# Patient Record
Sex: MALE | Race: WHITE | NOT HISPANIC OR LATINO
[De-identification: names, ages, dates, MRNs, and addresses within clinical notes are randomized per-mention and may not be internally consistent; named-entity substitution may affect disease eponyms.]

---

## 2017-02-22 ENCOUNTER — PRIOR ORIGINAL RECORDS (OUTPATIENT)
Dept: OTHER | Age: 67
End: 2017-02-22

## 2017-03-03 ENCOUNTER — TELEPHONE (OUTPATIENT)
Dept: FAMILY MEDICINE CLINIC | Facility: CLINIC | Age: 67
End: 2017-03-03

## 2017-03-08 ENCOUNTER — TELEPHONE (OUTPATIENT)
Dept: FAMILY MEDICINE CLINIC | Facility: CLINIC | Age: 67
End: 2017-03-08

## 2017-03-08 ENCOUNTER — MED REC SCAN ONLY (OUTPATIENT)
Dept: FAMILY MEDICINE CLINIC | Facility: CLINIC | Age: 67
End: 2017-03-08

## 2017-06-10 ENCOUNTER — APPOINTMENT (OUTPATIENT)
Dept: GENERAL RADIOLOGY | Facility: HOSPITAL | Age: 67
End: 2017-06-10
Attending: EMERGENCY MEDICINE
Payer: COMMERCIAL

## 2017-06-10 ENCOUNTER — HOSPITAL ENCOUNTER (EMERGENCY)
Facility: HOSPITAL | Age: 67
Discharge: HOME | End: 2017-06-10
Attending: EMERGENCY MEDICINE
Payer: COMMERCIAL

## 2017-06-10 VITALS
HEART RATE: 90 BPM | SYSTOLIC BLOOD PRESSURE: 141 MMHG | RESPIRATION RATE: 20 BRPM | HEIGHT: 69 IN | TEMPERATURE: 97 F | OXYGEN SATURATION: 98 % | WEIGHT: 250 LBS | DIASTOLIC BLOOD PRESSURE: 75 MMHG | BODY MASS INDEX: 37.03 KG/M2

## 2017-06-10 DIAGNOSIS — S29.011A CHEST WALL MUSCLE STRAIN, INITIAL ENCOUNTER: Primary | ICD-10-CM

## 2017-06-10 PROCEDURE — 71101 X-RAY EXAM UNILAT RIBS/CHEST: CPT | Performed by: EMERGENCY MEDICINE

## 2017-06-10 PROCEDURE — 93005 ELECTROCARDIOGRAM TRACING: CPT

## 2017-06-10 PROCEDURE — 93010 ELECTROCARDIOGRAM REPORT: CPT

## 2017-06-10 PROCEDURE — 96372 THER/PROPH/DIAG INJ SC/IM: CPT

## 2017-06-10 PROCEDURE — 99284 EMERGENCY DEPT VISIT MOD MDM: CPT

## 2017-06-10 RX ORDER — ACETAMINOPHEN AND CODEINE PHOSPHATE 300; 30 MG/1; MG/1
1-2 TABLET ORAL EVERY 4 HOURS PRN
Qty: 12 TABLET | Refills: 0 | Status: SHIPPED | OUTPATIENT
Start: 2017-06-10 | End: 2017-06-17

## 2017-06-10 RX ORDER — KETOROLAC TROMETHAMINE 30 MG/ML
60 INJECTION, SOLUTION INTRAMUSCULAR; INTRAVENOUS ONCE
Status: COMPLETED | OUTPATIENT
Start: 2017-06-10 | End: 2017-06-10

## 2017-06-10 NOTE — ED PROVIDER NOTES
Patient Seen in: [facility] Emergency Department    History   Patient presents with:  Chest Pain Angina (cardiovascular)    Stated Complaint: chest pain    HPI    27-year-old male presents with right-sided chest pain that began 1 day ago after a fit 200 MG Oral Cap,  Take  by mouth daily. Folic Acid 0.8 MG Oral Cap,  Take  by mouth daily.    Acyclovir 5 % Apply Externally Cream,  Apply to affected area 5 times daily for 5 days       Family History   Problem Relation Age of Onset   • Diabetes Mother display   EKG    Rate, intervals and axes as noted on EKG Report. Rate: 78  Rhythm: Sinus Rhythm  Reading: No evidence of acute ischemia. First-degree AV block.           Xr Ribs With Chest (3 Views), Right  (cpt=71101)    6/10/2017  PROCEDURE:  XR RIBS W very much to be musculoskeletal.  Will discharge home with Tylenol with codeine for pain control. Instructed to follow-up with primary care physician in 1-2 days.   To return to the emergency room with shortness of breath, fevers, hemoptysis, or with any c

## 2017-06-10 NOTE — ED INITIAL ASSESSMENT (HPI)
C/o several episodes of localized rt ant.  Chest pain after sneeze or cough,  Generally lasts a few minutes, worse with movement, and inspiration

## 2017-06-12 ENCOUNTER — OFFICE VISIT (OUTPATIENT)
Dept: FAMILY MEDICINE CLINIC | Facility: CLINIC | Age: 67
End: 2017-06-12

## 2017-06-12 VITALS
HEART RATE: 90 BPM | DIASTOLIC BLOOD PRESSURE: 80 MMHG | BODY MASS INDEX: 36 KG/M2 | OXYGEN SATURATION: 98 % | WEIGHT: 243 LBS | SYSTOLIC BLOOD PRESSURE: 130 MMHG | TEMPERATURE: 98 F | RESPIRATION RATE: 20 BRPM

## 2017-06-12 DIAGNOSIS — J20.9 BRONCHITIS WITH BRONCHOSPASM: Primary | ICD-10-CM

## 2017-06-12 PROCEDURE — 99213 OFFICE O/P EST LOW 20 MIN: CPT | Performed by: FAMILY MEDICINE

## 2017-06-12 RX ORDER — CIPROFLOXACIN 500 MG/1
TABLET, FILM COATED ORAL
Qty: 14 TABLET | Refills: 0 | Status: SHIPPED | OUTPATIENT
Start: 2017-06-12 | End: 2017-06-26

## 2017-06-26 ENCOUNTER — OFFICE VISIT (OUTPATIENT)
Dept: FAMILY MEDICINE CLINIC | Facility: CLINIC | Age: 67
End: 2017-06-26

## 2017-06-26 VITALS
TEMPERATURE: 99 F | RESPIRATION RATE: 16 BRPM | HEART RATE: 78 BPM | SYSTOLIC BLOOD PRESSURE: 126 MMHG | HEIGHT: 69 IN | BODY MASS INDEX: 36.36 KG/M2 | DIASTOLIC BLOOD PRESSURE: 80 MMHG | WEIGHT: 245.5 LBS

## 2017-06-26 DIAGNOSIS — J20.9 BRONCHITIS WITH BRONCHOSPASM: Primary | ICD-10-CM

## 2017-06-26 PROCEDURE — 99213 OFFICE O/P EST LOW 20 MIN: CPT | Performed by: FAMILY MEDICINE

## 2017-06-26 RX ORDER — ALBUTEROL SULFATE 90 UG/1
2 AEROSOL, METERED RESPIRATORY (INHALATION) EVERY 4 HOURS PRN
Qty: 1 INHALER | Refills: 6 | Status: SHIPPED | OUTPATIENT
Start: 2017-06-26 | End: 2017-10-18

## 2017-06-26 RX ORDER — PREDNISONE 20 MG/1
20 TABLET ORAL DAILY
Qty: 7 TABLET | Refills: 0 | Status: SHIPPED | OUTPATIENT
Start: 2017-06-26 | End: 2017-07-03

## 2017-06-26 NOTE — PROGRESS NOTES
Presents here with wife for follow-up visit from bronchospasm and bronchitis. Still very heavy smoker. He feels better on thesti alto. .  Taking 2 puffs daily.   Has been back to using his CPAP and feels much better today's exam vital signs are normal he h

## 2017-07-06 ENCOUNTER — OFFICE VISIT (OUTPATIENT)
Dept: FAMILY MEDICINE CLINIC | Facility: CLINIC | Age: 67
End: 2017-07-06

## 2017-07-06 VITALS
RESPIRATION RATE: 10 BRPM | HEART RATE: 114 BPM | HEIGHT: 69 IN | WEIGHT: 245 LBS | DIASTOLIC BLOOD PRESSURE: 80 MMHG | BODY MASS INDEX: 36.29 KG/M2 | OXYGEN SATURATION: 98 % | SYSTOLIC BLOOD PRESSURE: 128 MMHG | TEMPERATURE: 98 F

## 2017-07-06 DIAGNOSIS — R10.9 ACUTE RIGHT FLANK PAIN: Primary | ICD-10-CM

## 2017-07-06 DIAGNOSIS — J44.0 COPD (CHRONIC OBSTRUCTIVE PULMONARY DISEASE) WITH ACUTE BRONCHITIS (HCC): ICD-10-CM

## 2017-07-06 DIAGNOSIS — J20.9 COPD (CHRONIC OBSTRUCTIVE PULMONARY DISEASE) WITH ACUTE BRONCHITIS (HCC): ICD-10-CM

## 2017-07-06 PROCEDURE — 99213 OFFICE O/P EST LOW 20 MIN: CPT | Performed by: FAMILY MEDICINE

## 2017-07-06 NOTE — PROGRESS NOTES
Several days ago while riding in a car developed some very sharp noncolicky pain in his right upper posterior flank. There is no nausea or vomiting. Ibuprofen seemed to help. Urine was on colored and clear.   Today's exam he is entirely pain-free he has

## 2017-07-21 ENCOUNTER — OFFICE VISIT (OUTPATIENT)
Dept: FAMILY MEDICINE CLINIC | Facility: CLINIC | Age: 67
End: 2017-07-21

## 2017-07-21 VITALS
HEART RATE: 88 BPM | DIASTOLIC BLOOD PRESSURE: 66 MMHG | RESPIRATION RATE: 16 BRPM | OXYGEN SATURATION: 97 % | HEIGHT: 69 IN | TEMPERATURE: 99 F | BODY MASS INDEX: 36.31 KG/M2 | WEIGHT: 245.13 LBS | SYSTOLIC BLOOD PRESSURE: 116 MMHG

## 2017-07-21 DIAGNOSIS — J44.0 COPD (CHRONIC OBSTRUCTIVE PULMONARY DISEASE) WITH ACUTE BRONCHITIS (HCC): Primary | ICD-10-CM

## 2017-07-21 DIAGNOSIS — J20.9 COPD (CHRONIC OBSTRUCTIVE PULMONARY DISEASE) WITH ACUTE BRONCHITIS (HCC): Primary | ICD-10-CM

## 2017-07-21 PROCEDURE — 99213 OFFICE O/P EST LOW 20 MIN: CPT | Performed by: FAMILY MEDICINE

## 2017-07-21 NOTE — PROGRESS NOTES
Here for follow-up of his exacerbation of COPD. He is using the STIOLTO at 2 puffs once daily. He is also taking the inhaled corticosteroid. Subjectively he feels better. He is anxious to restart the Chantix. He is a very heavy smoker.   He has been on

## 2017-08-18 ENCOUNTER — OFFICE VISIT (OUTPATIENT)
Dept: FAMILY MEDICINE CLINIC | Facility: CLINIC | Age: 67
End: 2017-08-18

## 2017-08-18 VITALS
HEART RATE: 90 BPM | DIASTOLIC BLOOD PRESSURE: 68 MMHG | SYSTOLIC BLOOD PRESSURE: 118 MMHG | BODY MASS INDEX: 36.43 KG/M2 | HEIGHT: 69 IN | OXYGEN SATURATION: 95 % | TEMPERATURE: 98 F | RESPIRATION RATE: 18 BRPM | WEIGHT: 246 LBS

## 2017-08-18 DIAGNOSIS — Z71.6 TOBACCO ABUSE COUNSELING: ICD-10-CM

## 2017-08-18 DIAGNOSIS — J44.1 CHRONIC OBSTRUCTIVE PULMONARY DISEASE WITH ACUTE EXACERBATION (HCC): Primary | ICD-10-CM

## 2017-08-18 PROCEDURE — 99213 OFFICE O/P EST LOW 20 MIN: CPT | Performed by: FAMILY MEDICINE

## 2017-08-18 RX ORDER — BUPROPION HYDROCHLORIDE 150 MG/1
150 TABLET, EXTENDED RELEASE ORAL 2 TIMES DAILY
Qty: 120 TABLET | Refills: 1 | Status: SHIPPED | OUTPATIENT
Start: 2017-08-18 | End: 2017-12-14

## 2017-08-18 RX ORDER — BUPROPION HYDROCHLORIDE 150 MG/1
150 TABLET, EXTENDED RELEASE ORAL 2 TIMES DAILY
Qty: 60 TABLET | Refills: 0 | Status: SHIPPED | OUTPATIENT
Start: 2017-08-18 | End: 2017-08-18

## 2017-08-18 NOTE — PROGRESS NOTES
Here for follow-up of a prolonged exacerbation of COPD. He is feeling much better. I then turned the conversation quickly to smoking cessation with 20 minutes of education and suggestion. The patient has gradually worsening pulmonary function.     Exam t

## 2017-08-23 ENCOUNTER — PRIOR ORIGINAL RECORDS (OUTPATIENT)
Dept: OTHER | Age: 67
End: 2017-08-23

## 2017-09-22 ENCOUNTER — OFFICE VISIT (OUTPATIENT)
Dept: FAMILY MEDICINE CLINIC | Facility: CLINIC | Age: 67
End: 2017-09-22

## 2017-09-22 VITALS — HEART RATE: 80 BPM | TEMPERATURE: 98 F | DIASTOLIC BLOOD PRESSURE: 70 MMHG | SYSTOLIC BLOOD PRESSURE: 122 MMHG

## 2017-09-22 DIAGNOSIS — R53.83 FATIGUE, UNSPECIFIED TYPE: Primary | ICD-10-CM

## 2017-09-22 DIAGNOSIS — Z20.5 EXPOSURE TO HEPATITIS C: ICD-10-CM

## 2017-09-22 DIAGNOSIS — N40.0 BENIGN PROSTATIC HYPERPLASIA, UNSPECIFIED WHETHER LOWER URINARY TRACT SYMPTOMS PRESENT: ICD-10-CM

## 2017-09-22 DIAGNOSIS — Z71.6 TOBACCO ABUSE COUNSELING: ICD-10-CM

## 2017-09-22 DIAGNOSIS — I10 ESSENTIAL HYPERTENSION, BENIGN: ICD-10-CM

## 2017-09-22 DIAGNOSIS — R60.9 EDEMA, UNSPECIFIED TYPE: ICD-10-CM

## 2017-09-22 DIAGNOSIS — G47.33 OSA (OBSTRUCTIVE SLEEP APNEA): ICD-10-CM

## 2017-09-22 DIAGNOSIS — E78.00 PURE HYPERCHOLESTEROLEMIA: ICD-10-CM

## 2017-09-22 PROCEDURE — 99213 OFFICE O/P EST LOW 20 MIN: CPT | Performed by: FAMILY MEDICINE

## 2017-09-22 RX ORDER — LOSARTAN POTASSIUM AND HYDROCHLOROTHIAZIDE 25; 100 MG/1; MG/1
1 TABLET ORAL DAILY
Qty: 90 TABLET | Refills: 3 | Status: SHIPPED | COMMUNITY
Start: 2017-09-22 | End: 2017-12-14

## 2017-09-22 RX ORDER — SIMVASTATIN 40 MG
40 TABLET ORAL NIGHTLY
Qty: 90 TABLET | Refills: 3 | Status: SHIPPED | COMMUNITY
Start: 2017-09-22 | End: 2018-09-21

## 2017-09-22 RX ORDER — FUROSEMIDE 20 MG/1
20 TABLET ORAL DAILY
Qty: 90 TABLET | Refills: 1 | Status: SHIPPED | COMMUNITY
Start: 2017-09-22 | End: 2018-09-21

## 2017-09-22 RX ORDER — LOSARTAN POTASSIUM AND HYDROCHLOROTHIAZIDE 12.5; 1 MG/1; MG/1
1 TABLET ORAL DAILY
Qty: 90 TABLET | Refills: 3 | Status: SHIPPED | OUTPATIENT
Start: 2017-09-22 | End: 2018-09-21

## 2017-09-22 NOTE — PROGRESS NOTES
Here for follow-up of smoking cessation. He briefly tried long-acting bupropion but found the side effects intolerable. I worked with him today and encouragement without berating him.   Gave him several other options such as the use of nicotine gum to try

## 2017-10-05 ENCOUNTER — TELEPHONE (OUTPATIENT)
Dept: FAMILY MEDICINE CLINIC | Facility: CLINIC | Age: 67
End: 2017-10-05

## 2017-10-18 RX ORDER — ALBUTEROL SULFATE 90 UG/1
2 AEROSOL, METERED RESPIRATORY (INHALATION) EVERY 4 HOURS PRN
Qty: 3 INHALER | Refills: 3 | Status: SHIPPED | OUTPATIENT
Start: 2017-10-18 | End: 2017-10-19

## 2017-10-19 ENCOUNTER — TELEPHONE (OUTPATIENT)
Dept: FAMILY MEDICINE CLINIC | Facility: CLINIC | Age: 67
End: 2017-10-19

## 2017-10-19 RX ORDER — ALBUTEROL SULFATE 90 UG/1
2 AEROSOL, METERED RESPIRATORY (INHALATION) EVERY 4 HOURS PRN
Qty: 3 INHALER | Refills: 3 | Status: SHIPPED | OUTPATIENT
Start: 2017-10-19 | End: 2019-03-15

## 2017-10-24 ENCOUNTER — TELEPHONE (OUTPATIENT)
Dept: FAMILY MEDICINE CLINIC | Facility: CLINIC | Age: 67
End: 2017-10-24

## 2017-10-27 ENCOUNTER — APPOINTMENT (OUTPATIENT)
Dept: LAB | Age: 67
End: 2017-10-27
Attending: FAMILY MEDICINE
Payer: MEDICARE

## 2017-10-27 ENCOUNTER — NURSE ONLY (OUTPATIENT)
Dept: FAMILY MEDICINE CLINIC | Facility: CLINIC | Age: 67
End: 2017-10-27

## 2017-10-27 DIAGNOSIS — I10 ESSENTIAL HYPERTENSION, BENIGN: ICD-10-CM

## 2017-10-27 DIAGNOSIS — Z20.5 EXPOSURE TO HEPATITIS C: ICD-10-CM

## 2017-10-27 DIAGNOSIS — N40.0 BENIGN PROSTATIC HYPERPLASIA, UNSPECIFIED WHETHER LOWER URINARY TRACT SYMPTOMS PRESENT: ICD-10-CM

## 2017-10-27 DIAGNOSIS — R53.83 FATIGUE, UNSPECIFIED TYPE: ICD-10-CM

## 2017-10-27 DIAGNOSIS — Z23 NEED FOR PNEUMOCOCCAL VACCINATION: Primary | ICD-10-CM

## 2017-10-27 DIAGNOSIS — E78.00 PURE HYPERCHOLESTEROLEMIA: ICD-10-CM

## 2017-10-27 PROCEDURE — G0009 ADMIN PNEUMOCOCCAL VACCINE: HCPCS | Performed by: FAMILY MEDICINE

## 2017-10-27 PROCEDURE — 80061 LIPID PANEL: CPT

## 2017-10-27 PROCEDURE — 90670 PCV13 VACCINE IM: CPT | Performed by: FAMILY MEDICINE

## 2017-10-27 PROCEDURE — G0008 ADMIN INFLUENZA VIRUS VAC: HCPCS | Performed by: FAMILY MEDICINE

## 2017-10-27 PROCEDURE — 84443 ASSAY THYROID STIM HORMONE: CPT

## 2017-10-27 PROCEDURE — 80053 COMPREHEN METABOLIC PANEL: CPT

## 2017-10-27 PROCEDURE — 82306 VITAMIN D 25 HYDROXY: CPT

## 2017-10-27 PROCEDURE — 90653 IIV ADJUVANT VACCINE IM: CPT | Performed by: FAMILY MEDICINE

## 2017-10-27 PROCEDURE — 84153 ASSAY OF PSA TOTAL: CPT

## 2017-10-27 PROCEDURE — 36415 COLL VENOUS BLD VENIPUNCTURE: CPT

## 2017-10-27 PROCEDURE — 86803 HEPATITIS C AB TEST: CPT

## 2017-11-10 ENCOUNTER — TELEPHONE (OUTPATIENT)
Dept: FAMILY MEDICINE CLINIC | Facility: CLINIC | Age: 67
End: 2017-11-10

## 2017-12-14 ENCOUNTER — OFFICE VISIT (OUTPATIENT)
Dept: FAMILY MEDICINE CLINIC | Facility: CLINIC | Age: 67
End: 2017-12-14

## 2017-12-14 VITALS
TEMPERATURE: 98 F | OXYGEN SATURATION: 96 % | SYSTOLIC BLOOD PRESSURE: 122 MMHG | BODY MASS INDEX: 36.88 KG/M2 | WEIGHT: 249 LBS | HEART RATE: 80 BPM | HEIGHT: 69 IN | DIASTOLIC BLOOD PRESSURE: 74 MMHG | RESPIRATION RATE: 16 BRPM

## 2017-12-14 DIAGNOSIS — Z87.442 HISTORY OF RENAL CALCULI: ICD-10-CM

## 2017-12-14 DIAGNOSIS — G47.33 OSA ON CPAP: ICD-10-CM

## 2017-12-14 DIAGNOSIS — R60.0 LOWER EXTREMITY EDEMA: ICD-10-CM

## 2017-12-14 DIAGNOSIS — Z00.00 ROUTINE GENERAL MEDICAL EXAMINATION AT A HEALTH CARE FACILITY: Primary | ICD-10-CM

## 2017-12-14 DIAGNOSIS — Z99.89 OSA ON CPAP: ICD-10-CM

## 2017-12-14 PROCEDURE — 99212 OFFICE O/P EST SF 10 MIN: CPT | Performed by: INTERNAL MEDICINE

## 2017-12-14 PROCEDURE — G0402 INITIAL PREVENTIVE EXAM: HCPCS | Performed by: INTERNAL MEDICINE

## 2017-12-14 RX ORDER — SPIRONOLACTONE 25 MG
20 TABLET ORAL DAILY
COMMUNITY
Start: 2017-08-29

## 2017-12-14 NOTE — PROGRESS NOTES
Alycia Mcburney is a 74year old male who presents for a [facility] Emergency [facility] visit.  Pt has been made aware of what is covered/included in the AWV, and any additional concerns to be addressed ensue an additional visit cost.      Here for med follo 249 lb  08/18/17 : 246 lb  07/21/17 : 245 lb 2 oz  07/06/17 : 245 lb  06/26/17 : 245 lb 8 oz  06/12/17 : 243 lb    Body mass index is 36.77 kg/m².       Lab Results  Component Value Date    (H) 10/27/2017   GLU 95 08/11/2016   GLU 97 09/11/2014 without help    Toileting: Able without help    Dressing: Able without help    Eating: Able without help    Driving: Able without help    Preparing your meals: Able without help    Managing money/bills: Able without help    Taking medications as prescribed (mg/dL)   Date Value   05/13/2013 58     LDL Cholesterol (mg/dL)   Date Value   10/27/2017 42        EKG - w/ Initial Preventative Physical Exam only, or if medically necessary 2017    Colorectal Cancer Screening      Colonoscopy Screen every 10 years [place] 10/27/2017 1.28    No flowsheet data found. Digoxin Serum Conc  Annually No results found for: DIGOXIN No flowsheet data found. Diabetes      HgbA1C  Annually No results found for: A1C No flowsheet data found.     Creat/alb ratio  Annually      LDL MEDICAL INFORMATION:   Past Medical History:   Diagnosis Date   • CPAP (continuous positive airway pressure) dependence    • Influenza A (H1N1) 1/7/2014   • Kidney calculi       Past Surgical History:  1/1/04: ANESTH,SURGERY OF SHOULDER      Comment: jude Janel Ortega is a 74year old male who presents for a Medicare Assessment.      PLAN SUMMARY:   Diagnoses and all orders for this visit:    Routine general medical examination at a health care facility  - vaccines up to date     The patient indica

## 2017-12-31 ENCOUNTER — PRIOR ORIGINAL RECORDS (OUTPATIENT)
Dept: OTHER | Age: 67
End: 2017-12-31

## 2018-02-21 ENCOUNTER — PRIOR ORIGINAL RECORDS (OUTPATIENT)
Dept: OTHER | Age: 68
End: 2018-02-21

## 2018-03-23 ENCOUNTER — OFFICE VISIT (OUTPATIENT)
Dept: FAMILY MEDICINE CLINIC | Facility: CLINIC | Age: 68
End: 2018-03-23

## 2018-03-23 VITALS
OXYGEN SATURATION: 99 % | DIASTOLIC BLOOD PRESSURE: 68 MMHG | HEIGHT: 69 IN | SYSTOLIC BLOOD PRESSURE: 112 MMHG | HEART RATE: 86 BPM | RESPIRATION RATE: 16 BRPM | WEIGHT: 254 LBS | TEMPERATURE: 98 F | BODY MASS INDEX: 37.62 KG/M2

## 2018-03-23 DIAGNOSIS — G47.33 OSA AND COPD OVERLAP SYNDROME (HCC): ICD-10-CM

## 2018-03-23 DIAGNOSIS — E66.01 SEVERE OBESITY (BMI 35.0-39.9) WITH COMORBIDITY (HCC): Chronic | ICD-10-CM

## 2018-03-23 DIAGNOSIS — J44.1 CHRONIC OBSTRUCTIVE PULMONARY DISEASE WITH ACUTE EXACERBATION (HCC): ICD-10-CM

## 2018-03-23 DIAGNOSIS — J44.9 OSA AND COPD OVERLAP SYNDROME (HCC): ICD-10-CM

## 2018-03-23 DIAGNOSIS — E78.00 PURE HYPERCHOLESTEROLEMIA: Primary | ICD-10-CM

## 2018-03-23 DIAGNOSIS — D69.6 THROMBOCYTOPENIA (HCC): ICD-10-CM

## 2018-03-23 PROCEDURE — 99213 OFFICE O/P EST LOW 20 MIN: CPT | Performed by: FAMILY MEDICINE

## 2018-03-23 NOTE — PROGRESS NOTES
Patient is here with his wife for discussing the efficacy of his CPAP unit. Subjectively he feels much better on it. He still smoking heavily nearly 1/2 to a full pack per day. He is trying to cut back.     He sees Dr. Aria Sanchez our local hematologist

## 2018-05-07 ENCOUNTER — APPOINTMENT (OUTPATIENT)
Dept: LAB | Age: 68
End: 2018-05-07
Attending: FAMILY MEDICINE
Payer: MEDICARE

## 2018-05-07 DIAGNOSIS — E66.01 SEVERE OBESITY (BMI 35.0-39.9) WITH COMORBIDITY (HCC): Chronic | ICD-10-CM

## 2018-05-07 DIAGNOSIS — E78.00 PURE HYPERCHOLESTEROLEMIA: ICD-10-CM

## 2018-05-07 DIAGNOSIS — J44.1 CHRONIC OBSTRUCTIVE PULMONARY DISEASE WITH ACUTE EXACERBATION (HCC): ICD-10-CM

## 2018-05-07 PROCEDURE — 84443 ASSAY THYROID STIM HORMONE: CPT

## 2018-05-07 PROCEDURE — 82306 VITAMIN D 25 HYDROXY: CPT

## 2018-05-07 PROCEDURE — 80061 LIPID PANEL: CPT

## 2018-05-07 PROCEDURE — 86803 HEPATITIS C AB TEST: CPT

## 2018-05-07 PROCEDURE — 36415 COLL VENOUS BLD VENIPUNCTURE: CPT

## 2018-05-07 PROCEDURE — 80053 COMPREHEN METABOLIC PANEL: CPT

## 2018-07-10 ENCOUNTER — HOSPITAL ENCOUNTER (OUTPATIENT)
Dept: GENERAL RADIOLOGY | Age: 68
Discharge: HOME | End: 2018-07-10
Attending: FAMILY MEDICINE
Payer: MEDICARE

## 2018-07-10 ENCOUNTER — OFFICE VISIT (OUTPATIENT)
Dept: FAMILY MEDICINE CLINIC | Facility: CLINIC | Age: 68
End: 2018-07-10

## 2018-07-10 VITALS
HEIGHT: 69 IN | DIASTOLIC BLOOD PRESSURE: 64 MMHG | BODY MASS INDEX: 37.33 KG/M2 | RESPIRATION RATE: 18 BRPM | OXYGEN SATURATION: 98 % | WEIGHT: 252 LBS | TEMPERATURE: 98 F | SYSTOLIC BLOOD PRESSURE: 118 MMHG | HEART RATE: 76 BPM

## 2018-07-10 DIAGNOSIS — M71.21 BAKER'S CYST OF KNEE, RIGHT: ICD-10-CM

## 2018-07-10 DIAGNOSIS — M71.21 BAKER'S CYST OF KNEE, RIGHT: Primary | ICD-10-CM

## 2018-07-10 PROCEDURE — 99213 OFFICE O/P EST LOW 20 MIN: CPT | Performed by: FAMILY MEDICINE

## 2018-07-10 PROCEDURE — 73560 X-RAY EXAM OF KNEE 1 OR 2: CPT | Performed by: FAMILY MEDICINE

## 2018-07-10 NOTE — PROGRESS NOTES
Several weeks of tightness right knee    prob history of same. Denies acute injury. No other systemic complaints. Exam obvious nontender nonpulsatile bulge right popliteal space joint itself is very mild effusion no instability.     Denies chest pain o

## 2018-07-11 ENCOUNTER — TELEPHONE (OUTPATIENT)
Dept: FAMILY MEDICINE CLINIC | Facility: CLINIC | Age: 68
End: 2018-07-11

## 2018-07-11 NOTE — TELEPHONE ENCOUNTER
Per JG: in general, canes can increase the risk of falls. There is a good cane, called a Hurricane that is fairly inexpensive that Rey Campa feels pt can use \"sometimes\" along with the knowledge of canes increasing fall risk. LMOM for pt with this information.

## 2018-07-11 NOTE — TELEPHONE ENCOUNTER
Wants to know if he should be using a cane since he has a baker's cyst?  He doesn't want to be come dependant on it.   You can leave him a message on his home number or send him a [facility] message

## 2018-07-11 NOTE — TELEPHONE ENCOUNTER
Pt returned call, questioning if he can apply Arnica to whole knee not just Baker's cyst, advised yes. Task completed.

## 2018-07-23 ENCOUNTER — HOSPITAL ENCOUNTER (OUTPATIENT)
Dept: PHYSICAL THERAPY | Facility: HOSPITAL | Age: 68
Setting detail: THERAPIES SERIES
Discharge: HOME | End: 2018-07-23
Attending: FAMILY MEDICINE
Payer: MEDICARE

## 2018-07-23 DIAGNOSIS — M71.21 BAKER'S CYST OF KNEE, RIGHT: ICD-10-CM

## 2018-07-23 PROCEDURE — 97110 THERAPEUTIC EXERCISES: CPT

## 2018-07-23 PROCEDURE — 97035 APP MDLTY 1+ULTRASOUND EA 15: CPT

## 2018-07-23 PROCEDURE — 97163 PT EVAL HIGH COMPLEX 45 MIN: CPT

## 2018-07-23 NOTE — PROGRESS NOTES
LOWER EXTREMITY EVALUATION:   Referring Physician: Dr. Marcelo Cannon  Diagnosis: Baker's Cyst R leg     Date of Service: 7/23/2018     PATIENT SUMMARY   Tarun Ernst is a 74year old y/o male who presents to therapy today with complaints of R knee pain wnl  ER: R wnl; L wnl  IR: R wnl; L wnl Flexion: R 80*; L 125  Extension: R -20*; L 0    *=pain    DF: R wnl; L wnl  PF: R wnl; L wnl  INV: R wnl; L wnl  EV: R wnl; L wnl  Great toe ext: R wnl; L wnl     PROM:   Knee   Flexion: R 90*  Extension: R -15*  *= Duration: Patient will be seen for 2 x/week or a total of 8 visits over a 90 day period. Treatment will include: Manual Therapy; Therapeutic Exercises; Neuromuscular Re-education;  Therapeutic Activity; Gait Training; Electrical Stim; Pt education; Home exe

## 2018-07-26 ENCOUNTER — HOSPITAL ENCOUNTER (OUTPATIENT)
Dept: PHYSICAL THERAPY | Facility: HOSPITAL | Age: 68
Setting detail: THERAPIES SERIES
Discharge: HOME | End: 2018-07-26
Attending: FAMILY MEDICINE
Payer: MEDICARE

## 2018-07-26 PROCEDURE — 97110 THERAPEUTIC EXERCISES: CPT

## 2018-07-26 PROCEDURE — 97140 MANUAL THERAPY 1/> REGIONS: CPT

## 2018-07-26 NOTE — PROGRESS NOTES
Dx: Baker's cyst R knee         Authorized # of Visits:  medicare         Next MD visit: none scheduled  Fall Risk: standard         Precautions: n/a             Subjective: felt much better after last treatment.  Ultrasound helped a lot and doing exercises

## 2018-07-30 ENCOUNTER — HOSPITAL ENCOUNTER (OUTPATIENT)
Dept: PHYSICAL THERAPY | Facility: HOSPITAL | Age: 68
Setting detail: THERAPIES SERIES
Discharge: HOME | End: 2018-07-30
Attending: FAMILY MEDICINE
Payer: MEDICARE

## 2018-07-30 PROCEDURE — 97110 THERAPEUTIC EXERCISES: CPT

## 2018-07-30 PROCEDURE — 97140 MANUAL THERAPY 1/> REGIONS: CPT

## 2018-07-30 NOTE — PROGRESS NOTES
Dx: Baker's cyst R knee         Authorized # of Visits:  medicare         Next MD visit: none scheduled  Fall Risk: standard         Precautions: n/a             Subjective: doing home exercise program . Used cane yesterday on/off.   Having medial knee pain

## 2018-08-01 ENCOUNTER — HOSPITAL ENCOUNTER (OUTPATIENT)
Dept: PHYSICAL THERAPY | Facility: HOSPITAL | Age: 68
Setting detail: THERAPIES SERIES
Discharge: HOME | End: 2018-08-01
Attending: FAMILY MEDICINE
Payer: MEDICARE

## 2018-08-01 PROCEDURE — 97110 THERAPEUTIC EXERCISES: CPT

## 2018-08-01 PROCEDURE — 97014 ELECTRIC STIMULATION THERAPY: CPT

## 2018-08-01 PROCEDURE — 97140 MANUAL THERAPY 1/> REGIONS: CPT

## 2018-08-01 NOTE — PROGRESS NOTES
Dx: Baker's cyst R knee         Authorized # of Visits:  medicare         Next MD visit: none scheduled  Fall Risk: standard         Precautions: n/a             Subjective: having difficulty taking shoes off.  Pt crosses R leg over left leg when doing this UE    Lateral stepover 4 in x10 w UE -       Shuttle DLP 32# x20 Shuttle DLP 32# x20 -       Prone passive and  -knee flexion x10 ea    Quad set 5 sec x20    Quad set +SLR x10    HSS x30 R    Gait training       Prone passive and  -knee flexion x10 ea

## 2018-08-06 ENCOUNTER — HOSPITAL ENCOUNTER (OUTPATIENT)
Dept: PHYSICAL THERAPY | Facility: HOSPITAL | Age: 68
Setting detail: THERAPIES SERIES
Discharge: HOME | End: 2018-08-06
Attending: FAMILY MEDICINE
Payer: MEDICARE

## 2018-08-06 ENCOUNTER — TELEPHONE (OUTPATIENT)
Dept: FAMILY MEDICINE CLINIC | Facility: CLINIC | Age: 68
End: 2018-08-06

## 2018-08-06 PROCEDURE — 97140 MANUAL THERAPY 1/> REGIONS: CPT

## 2018-08-06 PROCEDURE — 97014 ELECTRIC STIMULATION THERAPY: CPT

## 2018-08-06 PROCEDURE — 97110 THERAPEUTIC EXERCISES: CPT

## 2018-08-06 NOTE — TELEPHONE ENCOUNTER
CALLING TO SAY THAT THE PT PERSON WANTS HIM TO TELL BLANCO HIS LEG IS SWELLING X 2 WEEKS AND HINDERING LEG MOVEMENT. CANT GET SWELLING DOWN AND NOT SURE WHAT TO DO.    TAKING TYLENOL AND LASIX FOR SWELLING.     SHOULD HE CON'T PT?  HE ONLY HAS 3 VISITS LEFT

## 2018-08-06 NOTE — PROGRESS NOTES
Dx: Baker's cyst R knee         Authorized # of Visits:  medicare         Next MD visit: none scheduled  Fall Risk: standard         Precautions: n/a             Subjective: better night of sleep. Doing home exercise program . Having medial knee pain.  Had and  -knee flexion x10 ea    Quad set 5 sec x20    Quad set +SLR x10    HSS x30 R    Gait training       Prone passive and  -knee flexion x10 ea      Quad set +SLR x10    HSS x30 R    Gait training Prone passive knee flexion x30    knee flexion x10 ea    H

## 2018-08-09 ENCOUNTER — HOSPITAL ENCOUNTER (OUTPATIENT)
Dept: PHYSICAL THERAPY | Facility: HOSPITAL | Age: 68
Setting detail: THERAPIES SERIES
Discharge: HOME | End: 2018-08-09
Attending: FAMILY MEDICINE
Payer: MEDICARE

## 2018-08-09 PROCEDURE — 97016 VASOPNEUMATIC DEVICE THERAPY: CPT

## 2018-08-09 PROCEDURE — 97140 MANUAL THERAPY 1/> REGIONS: CPT

## 2018-08-09 PROCEDURE — 97110 THERAPEUTIC EXERCISES: CPT

## 2018-08-09 NOTE — PROGRESS NOTES
-Dx: Baker's cyst R knee         Authorized # of Visits:  medicare         Next MD visit: none scheduled  Fall Risk: standard         Precautions: n/a             Subjective: Bad night of sleep and having a lot of R knee pain. Stopped taking tylenol.     Ob Prone passive and  -knee flexion x10 ea      Quad set +SLR x10    HSS x30 R    Gait training Prone passive knee flexion x30    knee flexion x10 ea    HSS x30 R    Gait training Gait training Gait training     KT medial R knee-  inhibitive - DKC ball x20

## 2018-08-13 ENCOUNTER — APPOINTMENT (OUTPATIENT)
Dept: ULTRASOUND IMAGING | Facility: HOSPITAL | Age: 68
End: 2018-08-13
Attending: EMERGENCY MEDICINE
Payer: MEDICARE

## 2018-08-13 ENCOUNTER — HOSPITAL ENCOUNTER (EMERGENCY)
Facility: HOSPITAL | Age: 68
Discharge: HOME | End: 2018-08-13
Attending: EMERGENCY MEDICINE
Payer: MEDICARE

## 2018-08-13 VITALS
DIASTOLIC BLOOD PRESSURE: 81 MMHG | BODY MASS INDEX: 37.33 KG/M2 | TEMPERATURE: 98 F | HEIGHT: 69 IN | RESPIRATION RATE: 18 BRPM | SYSTOLIC BLOOD PRESSURE: 141 MMHG | WEIGHT: 252 LBS | OXYGEN SATURATION: 98 % | HEART RATE: 70 BPM

## 2018-08-13 DIAGNOSIS — M25.561 ACUTE PAIN OF RIGHT KNEE: Primary | ICD-10-CM

## 2018-08-13 PROCEDURE — 99284 EMERGENCY DEPT VISIT MOD MDM: CPT

## 2018-08-13 PROCEDURE — 93971 EXTREMITY STUDY: CPT | Performed by: EMERGENCY MEDICINE

## 2018-08-13 PROCEDURE — 99285 EMERGENCY DEPT VISIT HI MDM: CPT

## 2018-08-13 RX ORDER — METHYLPREDNISOLONE 4 MG/1
TABLET ORAL
Qty: 1 PACKAGE | Refills: 0 | Status: SHIPPED | OUTPATIENT
Start: 2018-08-13 | End: 2018-08-18

## 2018-08-13 NOTE — ED INITIAL ASSESSMENT (HPI)
PT c/o pain to right leg, Pt has dx baker's cyst and has appt to see Dr. Urbano Pena tomorrow, however, Pt rpt pain is getting so bad he can't wait

## 2018-08-13 NOTE — ED PROVIDER NOTES
Patient Seen in: [facility] Emergency Department    History   Patient presents with:  Deep Vein Thrombosis (cardiovascular)    Stated Complaint: RLE pain, swelling    HPI    72-year-old male presents emergency department complaining of right leg luzma scleral icterus, mucous membranes are moist, there is no erythema or exudate in the posterior pharynx  Neck: Supple no JVD no lymphadenopathy no meningismus no carotid bruit  CV: Regular rate and rhythm no murmur rub  Respiratory: Clear to auscultation chris appropriate physician. Patient verbalizes and agrees with plan. Patient discharged in good condition.         Disposition and Plan     Clinical Impression:  Acute pain of right knee  (primary encounter diagnosis)    Disposition:  Discharge  8/13/2018  4:3

## 2018-08-14 ENCOUNTER — OFFICE VISIT (OUTPATIENT)
Dept: FAMILY MEDICINE CLINIC | Facility: CLINIC | Age: 68
End: 2018-08-14
Payer: MEDICARE

## 2018-08-14 VITALS
HEART RATE: 83 BPM | OXYGEN SATURATION: 95 % | SYSTOLIC BLOOD PRESSURE: 148 MMHG | BODY MASS INDEX: 37.03 KG/M2 | HEIGHT: 69 IN | WEIGHT: 250 LBS | DIASTOLIC BLOOD PRESSURE: 76 MMHG | TEMPERATURE: 98 F

## 2018-08-14 DIAGNOSIS — M23.91 INTERNAL DERANGEMENT OF RIGHT KNEE: Primary | ICD-10-CM

## 2018-08-14 PROCEDURE — 99213 OFFICE O/P EST LOW 20 MIN: CPT | Performed by: FAMILY MEDICINE

## 2018-08-14 NOTE — PROGRESS NOTES
Here with wife still fighting a chronic and recurrent right knee pain was seen in urgent care and x-ray showed no acute changes. On today's exam vital signs unremarkable he still is smoking quite a bit.   We discussed that as well but the knee itself was e

## 2018-08-16 ENCOUNTER — HOSPITAL ENCOUNTER (OUTPATIENT)
Dept: PHYSICAL THERAPY | Facility: HOSPITAL | Age: 68
Setting detail: THERAPIES SERIES
Discharge: HOME | End: 2018-08-16
Attending: FAMILY MEDICINE
Payer: MEDICARE

## 2018-08-16 PROCEDURE — 97110 THERAPEUTIC EXERCISES: CPT

## 2018-08-16 PROCEDURE — 97016 VASOPNEUMATIC DEVICE THERAPY: CPT

## 2018-08-16 PROCEDURE — 97140 MANUAL THERAPY 1/> REGIONS: CPT

## 2018-08-16 NOTE — PROGRESS NOTES
-Dx: Baker's cyst R knee         Authorized # of Visits:  medicare         Next MD visit: none scheduled  Fall Risk: standard         Precautions: n/a             Subjective: Went to ER secondary to knee pain on Monday. Doppler negative.  Possible pseudogout Knee   Flexion: R 90*  Extension: R -15*  *=pain         Accessory motion: pain with AP and PA testing tibiofemoral joint, decreased patellar mobility primarily inferiorly     Strength/MMT:   Hip Knee Foot/Ankle   Flexion: R 4+/5; L 5/5  Extension: R 4+/ min Upright bike L1  1/2 range x3 min   Upright bike L1  1/2 range/full range x8 min NuStep 8 min L5  NuStep 8 min L5     Anterior Lunge BOSU  x20 alternate Anterior Lunge BOSU  x20 Quad set 5 sec x10 x10  DKC ball x30     ASU 4 in x10  w UE    Lateral vika M/L, sup/inf x10 ea   10 min    KT medially and superiorly   STM/effleurage r R knee/  gastrocnemius mm   10 min    KT medially and superiorly STM/effleurage r R knee/  gastrocnemius mm   15 min    KT medially and superiorly STM/effleurage r R knee/  gastr

## 2018-08-20 ENCOUNTER — HOSPITAL ENCOUNTER (OUTPATIENT)
Dept: PHYSICAL THERAPY | Facility: HOSPITAL | Age: 68
Setting detail: THERAPIES SERIES
Discharge: HOME | End: 2018-08-20
Attending: FAMILY MEDICINE
Payer: MEDICARE

## 2018-08-20 PROCEDURE — 97016 VASOPNEUMATIC DEVICE THERAPY: CPT

## 2018-08-20 PROCEDURE — 97140 MANUAL THERAPY 1/> REGIONS: CPT

## 2018-08-20 PROCEDURE — 97110 THERAPEUTIC EXERCISES: CPT

## 2018-08-20 NOTE — PROGRESS NOTES
Progress Summary    Pt has attended8, cancelled 0, and no shown 0 visits in Physical Therapy.      Dx: Baker's cyst R knee         Authorized # of Visits:  medicare         Next MD visit: none scheduled  Fall Risk: standard         Precautions: n/a (was:4+/5); L 5/5  ER: R 5/5; L 5/5  IR: R 5/5; L 5/5 Flexion: R 5-/5 (was:4+/5); L 5/5  Extension: R 5-/5 (was:); L 5/5    DF: R 5/5; L 5/5  PF: R 5/5; L 5/5  INV: R 5/5; L 5/5  EV: R 5/5; L 5/5  Great toe ext: R 5/5; L 5/5      Special tests: Argenis's neg planning and for this course of care. Thank you for your referral. Please co-sign or sign and return this letter via fax as soon as possible to [phone]. If you have any questions, please contact me at Dept: [phone].     Sincerely,  Electronical periods of rest -   Prone passive and  -knee flexion x10 ea    Quad set 5 sec x20    Quad set +SLR x10    HSS x30 R    Gait training       Prone passive and  -knee flexion x10 ea      Quad set +SLR x10    HSS x30 R    Gait training Prone passive knee flexi

## 2018-08-20 NOTE — ADDENDUM NOTE
Encounter addended by: Jennifer Le PT on: 8/20/2018  4:20 PM<BR>    Actions taken: Charge Capture section accepted

## 2018-08-22 ENCOUNTER — PRIOR ORIGINAL RECORDS (OUTPATIENT)
Dept: OTHER | Age: 68
End: 2018-08-22

## 2018-08-27 ENCOUNTER — HOSPITAL ENCOUNTER (OUTPATIENT)
Dept: PHYSICAL THERAPY | Facility: HOSPITAL | Age: 68
Setting detail: THERAPIES SERIES
Discharge: HOME | End: 2018-08-27
Attending: FAMILY MEDICINE
Payer: MEDICARE

## 2018-08-27 PROCEDURE — 97140 MANUAL THERAPY 1/> REGIONS: CPT

## 2018-08-27 PROCEDURE — 97016 VASOPNEUMATIC DEVICE THERAPY: CPT

## 2018-08-27 PROCEDURE — 97110 THERAPEUTIC EXERCISES: CPT

## 2018-08-27 NOTE — ADDENDUM NOTE
Encounter addended by: Cheryl Davalos PT on: 8/27/2018  3:24 PM<BR>    Actions taken: Sign clinical note

## 2018-08-27 NOTE — PROGRESS NOTES
Dx: Baker's cyst R knee         Authorized # of Visits:  medicare         Next MD visit: none scheduled  Fall Risk: standard         Precautions: n/a             Subjective: no new c/o    Assessment: pt tolerated progression of strengthening and stabiliza reciprocally with minimal UE assist (8 visits)-ALMOST MET  · Pt will increase hip and knee strength to grossly 4+/5 to be able squat with minimal difficulty(8 visits)-MET  · Pt will improve SLS to 10s to improve safety with gait on uneven surfaces such as SLR movement Quad set + SLR x20    Cueing for contraction prior to SLR movement SLquad set +R SLR x20 SLquad set +R SLR x20 --   Tilt board F/B x20  w UE Tilt board F/B x20  wo UE - Tilt board F/B x20 hamstring stretch 30 sec x2 hamstring stretch 30 sec x2 Date:   Tx#: 12/ Date: Tx#: 13/ Date: Tx#: 14/ Date:    Tx#: 15/   NuStep 5 min L7         Large airex F/B , SS x5 ea         Monster walk FW.BW RTB x1'    SS RTB x1'         BB F/B x20 w UE    Shuttle DLP 50# x15         ASU 8 inch 30 w UE    Lateral s

## 2018-08-29 ENCOUNTER — HOSPITAL ENCOUNTER (OUTPATIENT)
Dept: PHYSICAL THERAPY | Facility: HOSPITAL | Age: 68
Setting detail: THERAPIES SERIES
Discharge: HOME | End: 2018-08-29
Attending: FAMILY MEDICINE
Payer: MEDICARE

## 2018-08-29 PROCEDURE — 97140 MANUAL THERAPY 1/> REGIONS: CPT

## 2018-08-29 PROCEDURE — 97110 THERAPEUTIC EXERCISES: CPT

## 2018-08-29 PROCEDURE — 97016 VASOPNEUMATIC DEVICE THERAPY: CPT

## 2018-08-29 NOTE — PROGRESS NOTES
Dx: Baker's cyst R knee         Authorized # of Visits:  medicare         Next MD visit: none scheduled  Fall Risk: standard         Precautions: n/a             Subjective: pain variable. Still has swelling. Less pain with KT.     Assessment: pt had media minimal difficulty (8 visits)-MET  · Pt will improve quad strength to 5/5 to ascend 1 flight of stairs reciprocally with minimal UE assist (8 visits)-ALMOST MET  · Pt will increase hip and knee strength to grossly 4+/5 to be able squat with minimal difficu min    Hans position-R knee flexion/extension x10 Quad set + SLR x10    Cueing for contraction prior to [employer] set + SLR x20    Cueing for contraction prior to SLR movement SLquad set +R SLR x20 SLquad set +R SLR x20 --   Tilt board F/B x20  w STM/effleurage r R knee/  gastrocnemius mm   15 min       Date: 8/27/2018  Tx#: 9/16 Date: 8/29  Tx#: 10/ Date: Tx#: 11/ Date: Tx#: 12/ Date: Tx#: 13/ Date: Tx#: 14/ Date:    Tx#: 15/   NuStep 5 min L7 NuStep 5 min L7        Large airex F/B , SS x5

## 2018-09-04 ENCOUNTER — HOSPITAL ENCOUNTER (OUTPATIENT)
Dept: PHYSICAL THERAPY | Facility: HOSPITAL | Age: 68
Setting detail: THERAPIES SERIES
Discharge: HOME | End: 2018-09-04
Attending: FAMILY MEDICINE
Payer: MEDICARE

## 2018-09-04 ENCOUNTER — OFFICE VISIT (OUTPATIENT)
Dept: FAMILY MEDICINE CLINIC | Facility: CLINIC | Age: 68
End: 2018-09-04
Payer: MEDICARE

## 2018-09-04 VITALS
RESPIRATION RATE: 16 BRPM | OXYGEN SATURATION: 98 % | SYSTOLIC BLOOD PRESSURE: 126 MMHG | TEMPERATURE: 98 F | BODY MASS INDEX: 36.88 KG/M2 | WEIGHT: 249 LBS | DIASTOLIC BLOOD PRESSURE: 70 MMHG | HEIGHT: 69 IN | HEART RATE: 94 BPM

## 2018-09-04 DIAGNOSIS — M23.91 INTERNAL DERANGEMENT OF MULTIPLE SITES OF RIGHT KNEE: Primary | ICD-10-CM

## 2018-09-04 PROCEDURE — 97140 MANUAL THERAPY 1/> REGIONS: CPT

## 2018-09-04 PROCEDURE — 97016 VASOPNEUMATIC DEVICE THERAPY: CPT

## 2018-09-04 PROCEDURE — 97110 THERAPEUTIC EXERCISES: CPT

## 2018-09-04 PROCEDURE — 99213 OFFICE O/P EST LOW 20 MIN: CPT | Performed by: FAMILY MEDICINE

## 2018-09-04 RX ORDER — LOSARTAN POTASSIUM AND HYDROCHLOROTHIAZIDE 25; 100 MG/1; MG/1
TABLET ORAL
COMMUNITY
Start: 2018-07-10 | End: 2018-09-04 | Stop reason: ALTCHOICE

## 2018-09-04 NOTE — PROGRESS NOTES
Patient with a past history of internal derangement of the right knee along with Baker's cyst come in for follow-up he has been an active participant in physical therapy and feels better.   He arrives today with slight tenderness along the medial aspect of

## 2018-09-04 NOTE — PROGRESS NOTES
Dx: Baker's cyst R knee         Authorized # of Visits:  medicare         Next MD visit: none scheduled  Fall Risk: standard         Precautions: n/a             Subjective: Pain: 2/10. Still has some swelling, but pain much less.  Walking without cane now knee AROM flexion to >/=120 degrees to improve ability to perform transfers with minimal difficulty (8 visits)-MET  · Pt will improve quad strength to 5/5 to ascend 1 flight of stairs reciprocally with minimal UE assist (8 visits)-ALMOST MET  · Pt will inc x20        ASU 4 in x10  w UE    BB F/B x20   Hans stretch x1 min Hans stretch x1 min    Hans position-R knee flexion/extension x10 Quad set + SLR x10    Cueing for contraction prior to [employer] + SLR x20    Cueing for contraction prior medially and superiorly STM/effleurage r R knee/  gastrocnemius mm   15 min     STM/effleurage r R knee/  gastrocnemius mm   15 min       Date: 8/27/2018  Tx#: 9/16 Date: 8/29  Tx#: 10/ Date: 9/4  Tx#: 11/ Date: Tx#: 12/ Date: Tx#: 13/ Date:    Tx#: 14/

## 2018-09-06 ENCOUNTER — HOSPITAL ENCOUNTER (OUTPATIENT)
Dept: PHYSICAL THERAPY | Facility: HOSPITAL | Age: 68
Setting detail: THERAPIES SERIES
Discharge: HOME | End: 2018-09-06
Attending: FAMILY MEDICINE
Payer: MEDICARE

## 2018-09-06 PROCEDURE — 97110 THERAPEUTIC EXERCISES: CPT

## 2018-09-06 PROCEDURE — 97016 VASOPNEUMATIC DEVICE THERAPY: CPT

## 2018-09-06 NOTE — ADDENDUM NOTE
Encounter addended by: Jack Nielsen PT on: 9/6/2018  1:49 PM<BR>    Actions taken: Sign clinical note

## 2018-09-06 NOTE — PROGRESS NOTES
Dx: Baker's cyst R knee         Authorized # of Visits:  medicare         Next MD visit: none scheduled  Fall Risk: standard         Precautions: n/a             Subjective: Pain: 2/10.  Saw MD who instructed pt to continue physical therapy for the round o terminal knee extension in stance (8 visits)-MET  · Pt will improve knee AROM flexion to >/=120 degrees to improve ability to perform transfers with minimal difficulty (8 visits)-MET  · Pt will improve quad strength to 5/5 to ascend 1 flight of stairs reci F/B x20 w min UE   DKC ball x30    SLR ball x20        ASU 4 in x10  w UE    BB F/B x20   Hans stretch x1 min Hans stretch x1 min    Hans position-R knee flexion/extension x10 Quad set + SLR x10    Cueing for contraction prior to [employer] [place] knee/  gastrocnemius mm   15 min    KT medially and superiorly STM/effleurage r R knee/  gastrocnemius mm   15 min     STM/effleurage r R knee/  gastrocnemius mm   15 min       Date: 8/27/2018  Tx#: 9/16 Date: 8/29  Tx#: 10/ Date: 9/4  Tx#: 11/ Date: 9/6

## 2018-09-10 ENCOUNTER — HOSPITAL ENCOUNTER (OUTPATIENT)
Dept: PHYSICAL THERAPY | Facility: HOSPITAL | Age: 68
Setting detail: THERAPIES SERIES
Discharge: HOME | End: 2018-09-10
Attending: FAMILY MEDICINE
Payer: MEDICARE

## 2018-09-10 PROCEDURE — 97016 VASOPNEUMATIC DEVICE THERAPY: CPT

## 2018-09-10 PROCEDURE — 97110 THERAPEUTIC EXERCISES: CPT

## 2018-09-13 ENCOUNTER — HOSPITAL ENCOUNTER (OUTPATIENT)
Dept: PHYSICAL THERAPY | Facility: HOSPITAL | Age: 68
Setting detail: THERAPIES SERIES
Discharge: HOME | End: 2018-09-13
Attending: FAMILY MEDICINE
Payer: MEDICARE

## 2018-09-13 PROCEDURE — 97016 VASOPNEUMATIC DEVICE THERAPY: CPT

## 2018-09-13 PROCEDURE — 97110 THERAPEUTIC EXERCISES: CPT

## 2018-09-13 NOTE — PROGRESS NOTES
Dx: Baker's cyst R knee         Authorized # of Visits:  medicare         Next MD visit: none scheduled  Fall Risk: standard         Precautions: n/a             Subjective: Pain: 1/10. Hasn't been using cane. Doing more activity around house.     Assessm improve quad strength to 5/5 to ascend 1 flight of stairs reciprocally with minimal UE assist (8 visits)-ALMOST MET  · Pt will increase hip and knee strength to grossly 4+/5 to be able squat with minimal difficulty(8 visits)-MET  · Pt will improve SLS to 1 stepovers 4 in x10 w UE ASU 6 inch x20     Lateral stepovers 4 in x10 w UE ASU 6 inch x20     Lateral stepovers 4 in x10 w UE ASU 6 inch x20      inverted BOSU balance x1'    Invert BOSU ball throw/catch x10    SLS rebounder 1# x10    - -    KT R medial kn

## 2018-09-17 ENCOUNTER — HOSPITAL ENCOUNTER (OUTPATIENT)
Dept: PHYSICAL THERAPY | Facility: HOSPITAL | Age: 68
Setting detail: THERAPIES SERIES
Discharge: HOME | End: 2018-09-17
Attending: FAMILY MEDICINE
Payer: MEDICARE

## 2018-09-17 PROCEDURE — 97110 THERAPEUTIC EXERCISES: CPT

## 2018-09-17 PROCEDURE — 97112 NEUROMUSCULAR REEDUCATION: CPT

## 2018-09-17 NOTE — PROGRESS NOTES
Discharge Summary    Pt has attended 15, cancelled 0, and no shown 0 visits in Physical Therapy.      Dx: Baker's cyst R knee         Authorized # of Visits:  medicare         Next MD visit: none scheduled  Fall Risk: standard         Precautions: n/a with AP and PA testing tibiofemoral joint, decreased patellar mobility primarily inferiorly)     Strength/MMT:   Hip Knee Foot/Ankle   Flexion: R 5/5 (was:4+/5); L 5/5  Extension: R 5/5 (was:4+/5); L 5/5  Abduction: R 5/5 (was:4+/5); L 5/5  ER: R 5/5; L 5/ care.    Thank you for your referral. If you have any questions, please contact me at Dept: [phone].     Sincerely,  Electronically signed by therapist: Juany Stockton PT               Date: 8/27/2018  Tx#: 9/16 Date: 8/29  Tx#: 10/ Date: 9/4  Tx#: effleurage R LE-leon posteriorly-  minimal STM effleurage R LE-leon posteriorly, patellar mobes x10 al dir-  minimal STM effleurage R LE-leon posteriorly, patellar mobes x10 al dir-  minimal    Game ready LE elevated medium compression x10 min Game ready LE e

## 2018-09-19 ENCOUNTER — HOSPITAL ENCOUNTER (OUTPATIENT)
Dept: PHYSICAL THERAPY | Facility: HOSPITAL | Age: 68
Setting detail: THERAPIES SERIES
Discharge: HOME | End: 2018-09-19
Attending: FAMILY MEDICINE
Payer: MEDICARE

## 2018-09-19 PROCEDURE — 97112 NEUROMUSCULAR REEDUCATION: CPT

## 2018-09-19 PROCEDURE — 97110 THERAPEUTIC EXERCISES: CPT

## 2018-09-19 NOTE — PROGRESS NOTES
Discharge Summary    Pt has attended 16, cancelled 0, and no shown 0 visits in Physical Therapy.      Dx: Baker's cyst R knee         Authorized # of Visits:  medicare         Next MD visit: none scheduled  Fall Risk: standard         Precautions: n/a -15*)  *=pain         Accessory motion: wnl (was:pain with AP and PA testing tibiofemoral joint, decreased patellar mobility primarily inferiorly)     Strength/MMT:   Hip Knee Foot/Ankle   Flexion: R 5/5 (was:4+/5); L 5/5  Extension: R 5/5 (was:4+/5); L 5/ care.    Thank you for your referral. If you have any questions, please contact me at Dept: [phone].     Sincerely,  Electronically signed by therapist: Juany Stockton PT               Date: 8/27/2018  Tx#: 9/16 Date: 8/29  Tx#: 10/ Date: 9/4  Tx#: x5  Jumping x5 Shuttle 50# DLP x20    Heel raises 25# x20   STM effleurage R LE-leon posteriorly STM effleurage R LE-leon posteriorly STM effleurage R LE-leon posteriorly STM effleurage R LE-leon posteriorly-  minimal STM effleurage R LE-leon posteriorly-  mini

## 2018-09-21 ENCOUNTER — OFFICE VISIT (OUTPATIENT)
Dept: FAMILY MEDICINE CLINIC | Facility: CLINIC | Age: 68
End: 2018-09-21
Payer: MEDICARE

## 2018-09-21 ENCOUNTER — TELEPHONE (OUTPATIENT)
Dept: FAMILY MEDICINE CLINIC | Facility: CLINIC | Age: 68
End: 2018-09-21

## 2018-09-21 VITALS
TEMPERATURE: 98 F | WEIGHT: 250 LBS | BODY MASS INDEX: 37.03 KG/M2 | SYSTOLIC BLOOD PRESSURE: 118 MMHG | RESPIRATION RATE: 16 BRPM | DIASTOLIC BLOOD PRESSURE: 64 MMHG | OXYGEN SATURATION: 98 % | HEART RATE: 88 BPM | HEIGHT: 69 IN

## 2018-09-21 DIAGNOSIS — E78.00 PURE HYPERCHOLESTEROLEMIA: ICD-10-CM

## 2018-09-21 DIAGNOSIS — I10 ESSENTIAL HYPERTENSION, BENIGN: ICD-10-CM

## 2018-09-21 PROCEDURE — 99213 OFFICE O/P EST LOW 20 MIN: CPT | Performed by: FAMILY MEDICINE

## 2018-09-21 RX ORDER — LOSARTAN POTASSIUM AND HYDROCHLOROTHIAZIDE 12.5; 1 MG/1; MG/1
1 TABLET ORAL DAILY
Qty: 90 TABLET | Refills: 3 | Status: SHIPPED | OUTPATIENT
Start: 2018-09-21 | End: 2018-09-21

## 2018-09-21 RX ORDER — LOSARTAN POTASSIUM AND HYDROCHLOROTHIAZIDE 12.5; 1 MG/1; MG/1
1 TABLET ORAL DAILY
Qty: 90 TABLET | Refills: 3 | Status: SHIPPED | OUTPATIENT
Start: 2018-09-21 | End: 2018-09-28 | Stop reason: CLARIF

## 2018-09-21 RX ORDER — FUROSEMIDE 20 MG/1
20 TABLET ORAL DAILY
Qty: 90 TABLET | Refills: 3 | Status: SHIPPED | OUTPATIENT
Start: 2018-09-21 | End: 2018-09-21

## 2018-09-21 RX ORDER — SIMVASTATIN 40 MG
40 TABLET ORAL NIGHTLY
Qty: 90 TABLET | Refills: 3 | Status: SHIPPED | OUTPATIENT
Start: 2018-09-21 | End: 2018-09-21

## 2018-09-21 RX ORDER — SIMVASTATIN 40 MG
40 TABLET ORAL NIGHTLY
Qty: 90 TABLET | Refills: 3 | Status: SHIPPED | OUTPATIENT
Start: 2018-09-21 | End: 2019-10-29

## 2018-09-21 RX ORDER — FUROSEMIDE 20 MG/1
20 TABLET ORAL DAILY
Qty: 90 TABLET | Refills: 3 | Status: SHIPPED | OUTPATIENT
Start: 2018-09-21 | End: 2020-09-18

## 2018-09-21 NOTE — TELEPHONE ENCOUNTER
PT ASKING IF DR SANTANA CHANGED HIS LOSARTEN RX.    HE USUALLY TAKES THE ONE WITH HCTZ 12.5      DR SANTANA CALLED [facility] AND ORDERED THE LOSARTEN WITHOUT HCTZ. IS THIS WHAT HE WANTS HIM TO TAKE?     SOMEONE HAS TO CALL [facility] AND CANCEL THE ONE BLANCO

## 2018-09-21 NOTE — PROGRESS NOTES
Presents for follow-up of left knee pain with leg swelling.   With the change in weather to the cooler side he is starting to feel better but is still taking furosemide 20 mg at least 3 times a week today's exam both knees display synovial thickening the pr

## 2018-09-27 ENCOUNTER — TELEPHONE (OUTPATIENT)
Dept: FAMILY MEDICINE CLINIC | Facility: CLINIC | Age: 68
End: 2018-09-27

## 2018-09-27 DIAGNOSIS — I10 ESSENTIAL HYPERTENSION, BENIGN: ICD-10-CM

## 2018-09-27 NOTE — TELEPHONE ENCOUNTER
He had a question about the Losartan that was prescribed. He has been taking Losartan 100 - 25 mg.   A script was recently sent on 9/21 to his mailorder for Losartan 100-12.5 mg and wanted to know if that is what he should be taking or why was there a alvarado

## 2018-09-28 RX ORDER — LOSARTAN POTASSIUM AND HYDROCHLOROTHIAZIDE 25; 100 MG/1; MG/1
1 TABLET ORAL DAILY
Qty: 90 TABLET | Refills: 3 | Status: SHIPPED | OUTPATIENT
Start: 2018-09-28 | End: 2019-10-29

## 2018-09-28 NOTE — TELEPHONE ENCOUNTER
Called patient - verified patient's name and  - informed pt of corrected med was sent to pharmacy and to take 100/12.5 until new script arrives. Patient verbalized understanding and had no further questions.

## 2018-10-11 ENCOUNTER — IMMUNIZATION (OUTPATIENT)
Dept: FAMILY MEDICINE CLINIC | Facility: CLINIC | Age: 68
End: 2018-10-11
Payer: MEDICARE

## 2018-10-11 PROCEDURE — 90653 IIV ADJUVANT VACCINE IM: CPT | Performed by: FAMILY MEDICINE

## 2018-10-11 PROCEDURE — G0008 ADMIN INFLUENZA VIRUS VAC: HCPCS | Performed by: FAMILY MEDICINE

## 2018-12-03 ENCOUNTER — OFFICE VISIT (OUTPATIENT)
Dept: FAMILY MEDICINE CLINIC | Facility: CLINIC | Age: 68
End: 2018-12-03
Payer: MEDICARE

## 2018-12-03 ENCOUNTER — HOSPITAL ENCOUNTER (OUTPATIENT)
Dept: GENERAL RADIOLOGY | Age: 68
Discharge: HOME | End: 2018-12-03
Attending: FAMILY MEDICINE
Payer: MEDICARE

## 2018-12-03 VITALS
RESPIRATION RATE: 16 BRPM | TEMPERATURE: 98 F | DIASTOLIC BLOOD PRESSURE: 78 MMHG | BODY MASS INDEX: 37.18 KG/M2 | OXYGEN SATURATION: 96 % | HEIGHT: 69 IN | WEIGHT: 251 LBS | SYSTOLIC BLOOD PRESSURE: 124 MMHG | HEART RATE: 86 BPM

## 2018-12-03 DIAGNOSIS — M17.12 PRIMARY OSTEOARTHRITIS OF LEFT KNEE: Primary | ICD-10-CM

## 2018-12-03 DIAGNOSIS — M17.12 PRIMARY OSTEOARTHRITIS OF LEFT KNEE: ICD-10-CM

## 2018-12-03 PROCEDURE — 73560 X-RAY EXAM OF KNEE 1 OR 2: CPT | Performed by: FAMILY MEDICINE

## 2018-12-03 PROCEDURE — 99213 OFFICE O/P EST LOW 20 MIN: CPT | Performed by: FAMILY MEDICINE

## 2018-12-03 NOTE — PROGRESS NOTES
Here with his wife has ongoing pain in his left knee, it mimics the chronic pain in his right knee from which she suffers from internal derangement and osteoarthritis today's exam he is obese with a BMI greater than 35 walks with a slight antalgic gait the

## 2018-12-31 ENCOUNTER — PRIOR ORIGINAL RECORDS (OUTPATIENT)
Dept: OTHER | Age: 68
End: 2018-12-31

## 2019-02-05 ENCOUNTER — TELEPHONE (OUTPATIENT)
Dept: FAMILY MEDICINE CLINIC | Facility: CLINIC | Age: 69
End: 2019-02-05

## 2019-02-05 DIAGNOSIS — G47.33 OBSTRUCTIVE SLEEP APNEA (ADULT) (PEDIATRIC): Primary | ICD-10-CM

## 2019-02-05 NOTE — TELEPHONE ENCOUNTER
Fax reccarmine from [employer] for CPAP supplies for patient. Placed in Mabelene Angelucci triage bin.

## 2019-03-04 ENCOUNTER — MED REC SCAN ONLY (OUTPATIENT)
Dept: FAMILY MEDICINE CLINIC | Facility: CLINIC | Age: 69
End: 2019-03-04

## 2019-03-15 ENCOUNTER — OFFICE VISIT (OUTPATIENT)
Dept: FAMILY MEDICINE CLINIC | Facility: CLINIC | Age: 69
End: 2019-03-15
Payer: MEDICARE

## 2019-03-15 VITALS
TEMPERATURE: 98 F | OXYGEN SATURATION: 97 % | BODY MASS INDEX: 38 KG/M2 | HEART RATE: 102 BPM | SYSTOLIC BLOOD PRESSURE: 126 MMHG | RESPIRATION RATE: 16 BRPM | WEIGHT: 260 LBS | DIASTOLIC BLOOD PRESSURE: 76 MMHG

## 2019-03-15 DIAGNOSIS — Z00.00 MEDICARE ANNUAL WELLNESS VISIT, SUBSEQUENT: Primary | ICD-10-CM

## 2019-03-15 DIAGNOSIS — Z13.6 SCREENING FOR ABDOMINAL AORTIC ANEURYSM: ICD-10-CM

## 2019-03-15 DIAGNOSIS — E55.9 HYPOVITAMINOSIS D: ICD-10-CM

## 2019-03-15 DIAGNOSIS — D69.6 THROMBOCYTOPENIA (HCC): ICD-10-CM

## 2019-03-15 DIAGNOSIS — E78.00 PURE HYPERCHOLESTEROLEMIA: ICD-10-CM

## 2019-03-15 DIAGNOSIS — E83.110 HEREDITARY HEMOCHROMATOSIS (HCC): ICD-10-CM

## 2019-03-15 DIAGNOSIS — L71.9 ROSACEA: ICD-10-CM

## 2019-03-15 DIAGNOSIS — M23.91 INTERNAL DERANGEMENT OF KNEE, RIGHT: ICD-10-CM

## 2019-03-15 DIAGNOSIS — Z71.6 TOBACCO ABUSE COUNSELING: ICD-10-CM

## 2019-03-15 DIAGNOSIS — G47.33 OSA ON CPAP: ICD-10-CM

## 2019-03-15 DIAGNOSIS — Z99.89 OSA ON CPAP: ICD-10-CM

## 2019-03-15 DIAGNOSIS — Z12.5 ENCOUNTER FOR SCREENING FOR MALIGNANT NEOPLASM OF PROSTATE: ICD-10-CM

## 2019-03-15 DIAGNOSIS — E66.01 SEVERE OBESITY (BMI 35.0-39.9) WITH COMORBIDITY (HCC): Chronic | ICD-10-CM

## 2019-03-15 DIAGNOSIS — Z12.11 SCREEN FOR COLON CANCER: ICD-10-CM

## 2019-03-15 DIAGNOSIS — Z13.29 SCREENING FOR THYROID DISORDER: ICD-10-CM

## 2019-03-15 PROCEDURE — G0438 PPPS, INITIAL VISIT: HCPCS | Performed by: FAMILY MEDICINE

## 2019-03-15 PROCEDURE — 96160 PT-FOCUSED HLTH RISK ASSMT: CPT | Performed by: FAMILY MEDICINE

## 2019-03-15 RX ORDER — ALBUTEROL SULFATE 90 UG/1
2 AEROSOL, METERED RESPIRATORY (INHALATION) EVERY 4 HOURS PRN
Qty: 3 INHALER | Refills: 3 | Status: SHIPPED | OUTPATIENT
Start: 2019-03-15 | End: 2020-12-04

## 2019-03-20 PROBLEM — J44.1 CHRONIC OBSTRUCTIVE PULMONARY DISEASE WITH ACUTE EXACERBATION (HCC): Status: RESOLVED | Noted: 2018-03-23 | Resolved: 2019-03-20

## 2019-03-20 PROBLEM — M71.21 SYNOVIAL CYST OF RIGHT POPLITEAL SPACE: Status: RESOLVED | Noted: 2018-07-10 | Resolved: 2019-03-20

## 2019-03-20 NOTE — PROGRESS NOTES
Stephen Akhtar REASON FOR VISIT:    Jorden Aldridge is a 79year old male who presents for a MA Supervisit.          Patient Care Team: Patient Care Team:  Britney Randall DO as PCP - [facility])    Patient Active Problem List:     PURNIMA on CPAP 89 -     Cholesterol Latest Ref Rng & Units 5/7/2018 10/27/2017 8/11/2016 9/11/2014 5/13/2013 1/17/2012 4/20/2011   Total Cholesterol <200 mg/dL 113 103 114 120 123 138 155   Triglycerides <150 mg/dL 120 73 93 113 119 97 108   HDL >45 mg/dL 39(L) 46 48 47 help  Preparing your meals: Able without help  Managing money/bills: Able without help  Taking medications as prescribed: Able without help  Hearing Problems?: Yes(hearing aides)     Functional Status     Hearing Problems?: Yes(hearing aides)  Vision Probl Ophthalmology Visit Annually Within 6 months   Immunizations     Zoster (Not covered by Medicare Part B) No orders found for this or any previous visit.      SPECIFIC DISEASE MONITORING Internal Lab or Procedure     Annual Monitoring of Persistent Medicatio 10/27/2017      Pneumovax 23          10/14/2010      TDAP                  11/21/2013      Zoster Vaccine Live (Zostavax)                          01/17/2012        SOCIAL HISTORY:   Social History    Tobacco Use      Smoking status: Current Ever knee, right   Physical therapy    Thrombocytopenia ([facility] [address]   Sees Hematology     PURNIMA on CPAP   Continue CPAP    Rosacea   Continue Arnica    Tobacco abuse counseling   Tobacco cession encouraged       Other orders  -     Albuterol Sulfate HFA (PROAIR HFA) 10

## 2019-04-09 ENCOUNTER — OFFICE VISIT (OUTPATIENT)
Dept: HEMATOLOGY/ONCOLOGY | Facility: HOSPITAL | Age: 69
End: 2019-04-09
Attending: INTERNAL MEDICINE
Payer: MEDICARE

## 2019-04-09 VITALS
WEIGHT: 259 LBS | HEART RATE: 88 BPM | TEMPERATURE: 99 F | RESPIRATION RATE: 18 BRPM | HEIGHT: 70.32 IN | SYSTOLIC BLOOD PRESSURE: 107 MMHG | DIASTOLIC BLOOD PRESSURE: 69 MMHG | BODY MASS INDEX: 36.67 KG/M2 | OXYGEN SATURATION: 96 %

## 2019-04-09 DIAGNOSIS — E83.110 HEMOCHROMATOSIS ASSOCIATED WITH COMPOUND HETEROZYGOUS MUTATION IN HFE GENE (HCC): Primary | ICD-10-CM

## 2019-04-09 PROBLEM — Q98.4 KLINEFELTER SYNDROME: Status: ACTIVE | Noted: 2019-04-09

## 2019-04-09 PROBLEM — Q98.4 KLINEFELTER SYNDROME (HCC): Status: ACTIVE | Noted: 2019-04-09

## 2019-04-09 PROCEDURE — 99204 OFFICE O/P NEW MOD 45 MIN: CPT | Performed by: INTERNAL MEDICINE

## 2019-04-09 NOTE — PROGRESS NOTES
Patient is here for MD consult and second opinion. Hx of hereditary hemochromatosis since 2009. Patient gets phlebotomies every 3 months as needed.        Education Record    Learner:  Patient and Spouse    Disease / Diagnosis: consult     Vick / Kandace Norris

## 2019-04-10 NOTE — CONSULTS
[facility]    PATIENT'S NAME: Jeremias Rape   CONSULTING PHYSICIAN: Laura Wahl M.D.    PATIENT ACCOUNT #: [de-identified] LOCATION: [address] [place] [address] RECORD #: [id number] YOB: 1950   CONSULTATION DATE: 04/09/2019       CAN know how this was verified, but he states that he has been told that he is sterile as a result of this.       MEDICATIONS:  Albuterol inhaler 2 puffs q.4 h. p.r.n.; aspirin 81 mg daily; vitamin D3 at 3000 units daily; coenzyme Q10 at 806 mg daily; folic aci neurologic history. The remainder of a 10-point complete review of systems is unremarkable with pertinent positives and negatives in the HPI. PHYSICAL EXAMINATION:    GENERAL:  He is an overweight male in no acute distress.   VITAL SIGNS:  His perfo high, then we would increase the frequency. It is a fairly straightforward approach to managing this. He should have no long-term health consequences from this.   For now, I have not given him another appointment but have certainly encouraged him to call

## 2019-06-03 ENCOUNTER — OFFICE VISIT (OUTPATIENT)
Dept: FAMILY MEDICINE CLINIC | Facility: CLINIC | Age: 69
End: 2019-06-03
Payer: MEDICARE

## 2019-06-03 VITALS
DIASTOLIC BLOOD PRESSURE: 66 MMHG | RESPIRATION RATE: 16 BRPM | OXYGEN SATURATION: 99 % | SYSTOLIC BLOOD PRESSURE: 130 MMHG | HEART RATE: 61 BPM | TEMPERATURE: 98 F

## 2019-06-03 DIAGNOSIS — S30.811A ABRASION OF GROIN, INITIAL ENCOUNTER: ICD-10-CM

## 2019-06-03 DIAGNOSIS — L53.9 ERYTHEMA OF SKIN: Primary | ICD-10-CM

## 2019-06-03 PROCEDURE — 87186 SC STD MICRODIL/AGAR DIL: CPT | Performed by: PHYSICIAN ASSISTANT

## 2019-06-03 PROCEDURE — 87147 CULTURE TYPE IMMUNOLOGIC: CPT | Performed by: PHYSICIAN ASSISTANT

## 2019-06-03 PROCEDURE — 87205 SMEAR GRAM STAIN: CPT | Performed by: PHYSICIAN ASSISTANT

## 2019-06-03 PROCEDURE — 99213 OFFICE O/P EST LOW 20 MIN: CPT | Performed by: PHYSICIAN ASSISTANT

## 2019-06-03 PROCEDURE — 87070 CULTURE OTHR SPECIMN AEROBIC: CPT | Performed by: PHYSICIAN ASSISTANT

## 2019-06-03 RX ORDER — SULFAMETHOXAZOLE AND TRIMETHOPRIM 800; 160 MG/1; MG/1
1 TABLET ORAL 2 TIMES DAILY
Qty: 14 TABLET | Refills: 0 | Status: SHIPPED | OUTPATIENT
Start: 2019-06-03 | End: 2019-06-06 | Stop reason: CLARIF

## 2019-06-03 NOTE — PATIENT INSTRUCTIONS
Please follow up with your PCP if no improvement within 5-7 days. Go directly to the ER for any acute worsening of symptoms.    Will send out wound culture and contact you with the results in 2-3 days  Wash area with soap and water; warm moist soaks to area

## 2019-06-03 NOTE — PROGRESS NOTES
CHIEF COMPLAINT:   Patient presents with:  Bite Sting,Insect (integumentary): x 4 days, left groin area    HPI:     Filiberto Lang is a 79year old male who presents with concerns of redness to skin. Patient first noticed symptoms 4 days ago.   Report furosemide 20 MG Oral Tab Take 1 tablet (20 mg total) by mouth daily. Disp: 90 tablet Rfl: 3   Homeopathic Products (ARNICA) External Gel Apply topically 3 (three) times daily.  Disp:  Rfl:       Past Medical History:   Diagnosis Date   • Chronic obstruct bilaterally, no wheezes or rhonchi. Breathing is non labored. CARDIO: RRR without murmur  EXTREMITIES: no cyanosis, clubbing or edema. Cap refill brisk- less than 2 seconds. LYMPH: no lymphadenopathy.       ASSESSMENT AND PLAN:     ASSESSMENT: Erythema

## 2019-06-06 ENCOUNTER — TELEPHONE (OUTPATIENT)
Dept: FAMILY MEDICINE CLINIC | Facility: CLINIC | Age: 69
End: 2019-06-06

## 2019-06-06 ENCOUNTER — HOSPITAL ENCOUNTER (OUTPATIENT)
Age: 69
Discharge: HOME | End: 2019-06-06
Attending: FAMILY MEDICINE
Payer: MEDICARE

## 2019-06-06 VITALS
DIASTOLIC BLOOD PRESSURE: 62 MMHG | HEIGHT: 70 IN | OXYGEN SATURATION: 96 % | RESPIRATION RATE: 14 BRPM | HEART RATE: 95 BPM | BODY MASS INDEX: 36.51 KG/M2 | TEMPERATURE: 98 F | SYSTOLIC BLOOD PRESSURE: 124 MMHG | WEIGHT: 255 LBS
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
## 2019-06-06 DIAGNOSIS — S70.362D INSECT BITE OF LEFT THIGH, SUBSEQUENT ENCOUNTER: Primary | ICD-10-CM

## 2019-06-06 PROCEDURE — 99204 OFFICE O/P NEW MOD 45 MIN: CPT

## 2019-06-06 PROCEDURE — 99213 OFFICE O/P EST LOW 20 MIN: CPT

## 2019-06-06 RX ORDER — SULFAMETHOXAZOLE AND TRIMETHOPRIM 800; 160 MG/1; MG/1
1 TABLET ORAL 2 TIMES DAILY
COMMUNITY
End: 2019-06-13

## 2019-06-06 RX ORDER — SULFAMETHOXAZOLE AND TRIMETHOPRIM 800; 160 MG/1; MG/1
1 TABLET ORAL 2 TIMES DAILY
Qty: 14 TABLET | Refills: 0 | Status: SHIPPED | OUTPATIENT
Start: 2019-06-06 | End: 2019-06-13

## 2019-06-06 NOTE — ED PROVIDER NOTES
Patient Seen in: [facility] END    History   Patient presents with:   Follow - Up    Stated Complaint: bug bite near groin area    HPI  61-year-old gentleman presents to Medicare with increasing redness and warmth to touch in the lef SpO2 96 %   O2 Device None (Room air)       Current:/62   Pulse 95   Temp 98 °F (36.7 °C) (Temporal)   Resp 14   Ht 177.8 cm (5' 10\")   Wt 115.7 kg   SpO2 96%   BMI 36.59 kg/m²         Physical Exam   Constitutional: He is oriented to person, place, [facility] [address]  [phone]    Call in 4 days          Medications Prescribed:  Current Discharge Medication List    START taking these medications    Sulfamethoxazole-TMP -160 MG Oral Tab per tablet  Take 1 tablet by mouth 2 (two) times daily

## 2019-06-06 NOTE — TELEPHONE ENCOUNTER
Called concerning wound culture 4+ Staph Aureus (resistent to Clindamycin). Relates that wound getting better. Reinforced that medication Staph infection was sensitive to Bactrim. Relates that he'll hold off for now.

## 2019-06-06 NOTE — ED INITIAL ASSESSMENT (HPI)
Patient here for follow-up visit to redness to left inner thigh/groin area. Had been seen 6/3/19 and given bactrim and bacitracin but returned today because of increase redness. States he first noticed a 2in x 1/8 May 29th when he woke up. No h/o mrsa. No feve

## 2019-06-13 ENCOUNTER — OFFICE VISIT (OUTPATIENT)
Dept: FAMILY MEDICINE CLINIC | Facility: CLINIC | Age: 69
End: 2019-06-13
Payer: MEDICARE

## 2019-06-13 VITALS
OXYGEN SATURATION: 98 % | HEIGHT: 70 IN | HEART RATE: 88 BPM | BODY MASS INDEX: 36.51 KG/M2 | TEMPERATURE: 99 F | SYSTOLIC BLOOD PRESSURE: 110 MMHG | WEIGHT: 255 LBS | RESPIRATION RATE: 20 BRPM | DIASTOLIC BLOOD PRESSURE: 70 MMHG

## 2019-06-13 DIAGNOSIS — R21 RASH AND NONSPECIFIC SKIN ERUPTION: Primary | ICD-10-CM

## 2019-06-13 PROCEDURE — 99213 OFFICE O/P EST LOW 20 MIN: CPT | Performed by: INTERNAL MEDICINE

## 2019-06-13 NOTE — PROGRESS NOTES
Lenore Hanks is a 79year old male. HPI:   Here for follow up. Treated for a rash and questionable infection due to a suspected spider bite with bactroban and bactrim with good results. No fevers or chills.  Previously had some discharge but no Date   • Chronic obstructive pulmonary disease with acute exacerbation ([facility] 3/23/2018   • CPAP (continuous positive airway pressure) dependence    • Hereditary hemochromatosis ([facility] 01/2009   • Influenza A (H1N1) 1/7/2014   • Kidney calculi    • Meralgia

## 2019-09-20 ENCOUNTER — OFFICE VISIT (OUTPATIENT)
Dept: FAMILY MEDICINE CLINIC | Facility: CLINIC | Age: 69
End: 2019-09-20
Payer: MEDICARE

## 2019-09-20 VITALS
HEART RATE: 90 BPM | RESPIRATION RATE: 16 BRPM | DIASTOLIC BLOOD PRESSURE: 82 MMHG | WEIGHT: 260 LBS | TEMPERATURE: 98 F | OXYGEN SATURATION: 98 % | SYSTOLIC BLOOD PRESSURE: 122 MMHG | BODY MASS INDEX: 37.22 KG/M2 | HEIGHT: 70 IN

## 2019-09-20 DIAGNOSIS — E55.9 HYPOVITAMINOSIS D: ICD-10-CM

## 2019-09-20 DIAGNOSIS — E78.00 PURE HYPERCHOLESTEROLEMIA: ICD-10-CM

## 2019-09-20 DIAGNOSIS — Z99.89 OSA ON CPAP: Primary | ICD-10-CM

## 2019-09-20 DIAGNOSIS — G47.33 OSA ON CPAP: Primary | ICD-10-CM

## 2019-09-20 PROCEDURE — 90662 IIV NO PRSV INCREASED AG IM: CPT | Performed by: FAMILY MEDICINE

## 2019-09-20 PROCEDURE — G0008 ADMIN INFLUENZA VIRUS VAC: HCPCS | Performed by: FAMILY MEDICINE

## 2019-09-20 PROCEDURE — 99214 OFFICE O/P EST MOD 30 MIN: CPT | Performed by: FAMILY MEDICINE

## 2019-09-20 NOTE — PROGRESS NOTES
Presents today for an updating review of his CPAP unit. He finds that the unit causes severe and rather extreme nasal pharyngeal dryness. It has caused him to develop localized irritation and breakdown of skin.   Today's exam however is normal his lungs a

## 2019-09-21 ENCOUNTER — TELEPHONE (OUTPATIENT)
Dept: FAMILY MEDICINE CLINIC | Facility: CLINIC | Age: 69
End: 2019-09-21

## 2019-10-29 DIAGNOSIS — E78.00 PURE HYPERCHOLESTEROLEMIA: ICD-10-CM

## 2019-10-29 RX ORDER — SIMVASTATIN 40 MG
TABLET ORAL
Qty: 90 TABLET | Refills: 3 | Status: SHIPPED | OUTPATIENT
Start: 2019-10-29 | End: 2020-11-10

## 2019-10-29 RX ORDER — LOSARTAN POTASSIUM AND HYDROCHLOROTHIAZIDE 25; 100 MG/1; MG/1
TABLET ORAL
Qty: 90 TABLET | Refills: 3 | Status: SHIPPED | OUTPATIENT
Start: 2019-10-29 | End: 2020-11-10

## 2019-11-06 ENCOUNTER — LAB ENCOUNTER (OUTPATIENT)
Dept: LAB | Age: 69
End: 2019-11-06
Attending: FAMILY MEDICINE
Payer: MEDICARE

## 2019-11-06 ENCOUNTER — HOSPITAL ENCOUNTER (OUTPATIENT)
Dept: ULTRASOUND IMAGING | Age: 69
Discharge: HOME | End: 2019-11-06
Attending: FAMILY MEDICINE
Payer: MEDICARE

## 2019-11-06 DIAGNOSIS — E78.00 PURE HYPERCHOLESTEROLEMIA: ICD-10-CM

## 2019-11-06 DIAGNOSIS — Z13.6 SCREENING FOR ABDOMINAL AORTIC ANEURYSM: ICD-10-CM

## 2019-11-06 DIAGNOSIS — E55.9 HYPOVITAMINOSIS D: ICD-10-CM

## 2019-11-06 PROCEDURE — 84443 ASSAY THYROID STIM HORMONE: CPT

## 2019-11-06 PROCEDURE — 85025 COMPLETE CBC W/AUTO DIFF WBC: CPT

## 2019-11-06 PROCEDURE — 80053 COMPREHEN METABOLIC PANEL: CPT

## 2019-11-06 PROCEDURE — 82306 VITAMIN D 25 HYDROXY: CPT

## 2019-11-06 PROCEDURE — 76770 US EXAM ABDO BACK WALL COMP: CPT | Performed by: FAMILY MEDICINE

## 2019-11-06 PROCEDURE — 36415 COLL VENOUS BLD VENIPUNCTURE: CPT

## 2019-11-06 PROCEDURE — 80061 LIPID PANEL: CPT

## 2020-01-10 ENCOUNTER — TELEPHONE (OUTPATIENT)
Dept: FAMILY MEDICINE CLINIC | Facility: CLINIC | Age: 70
End: 2020-01-10

## 2020-02-13 ENCOUNTER — HOSPITAL ENCOUNTER (OUTPATIENT)
Age: 70
Discharge: HOME | End: 2020-02-13
Payer: MEDICARE

## 2020-02-13 ENCOUNTER — APPOINTMENT (OUTPATIENT)
Dept: GENERAL RADIOLOGY | Age: 70
End: 2020-02-13
Attending: PHYSICIAN ASSISTANT
Payer: MEDICARE

## 2020-02-13 VITALS
BODY MASS INDEX: 37.22 KG/M2 | DIASTOLIC BLOOD PRESSURE: 63 MMHG | SYSTOLIC BLOOD PRESSURE: 147 MMHG | HEART RATE: 94 BPM | TEMPERATURE: 99 F | OXYGEN SATURATION: 98 % | HEIGHT: 70 IN | RESPIRATION RATE: 20 BRPM | WEIGHT: 260 LBS

## 2020-02-13 DIAGNOSIS — M25.562 ACUTE PAIN OF LEFT KNEE: Primary | ICD-10-CM

## 2020-02-13 DIAGNOSIS — M25.462 EFFUSION OF LEFT KNEE: ICD-10-CM

## 2020-02-13 PROCEDURE — 73560 X-RAY EXAM OF KNEE 1 OR 2: CPT | Performed by: PHYSICIAN ASSISTANT

## 2020-02-13 PROCEDURE — 99213 OFFICE O/P EST LOW 20 MIN: CPT

## 2020-02-13 NOTE — ED PROVIDER NOTES
Patient Seen in: [facility]      History   Patient presents with:  Pain    Stated Complaint: TL- left knee pain    HPI    CHIEF COMPLAINT: Left knee pain     HISTORY OF PRESENT ILLNESS: Patient is a 44-year-old male who presents Family history reviewed with patient/caregiver and is not pertinent to presenting problem.     Social History    Tobacco Use      Smoking status: Current Every Day Smoker        Packs/day: 1.00        Types: Cigarettes      Smokeless tobacco: left knee pain. Patient notes that it is worse when shoveling snow. Patient also notes that the pain is mid anterior and lateral.    FINDINGS:  BONES:  Normal.  No significant arthropathy or acute abnormality. SOFT TISSUES:  Negative.   No visible soft tiss

## 2020-02-13 NOTE — ED INITIAL ASSESSMENT (HPI)
Non traumatic left knee pain chronic worse this last week \"aches\" some relief with motrin and arthritis gel. Worse today when shoveling snow.

## 2020-02-21 ENCOUNTER — HOSPITAL ENCOUNTER (OUTPATIENT)
Dept: GENERAL RADIOLOGY | Facility: HOSPITAL | Age: 70
Discharge: HOME | End: 2020-02-21
Attending: ORTHOPAEDIC SURGERY
Payer: MEDICARE

## 2020-02-21 ENCOUNTER — OFFICE VISIT (OUTPATIENT)
Dept: ORTHOPEDICS CLINIC | Facility: CLINIC | Age: 70
End: 2020-02-21
Payer: MEDICARE

## 2020-02-21 VITALS
RESPIRATION RATE: 16 BRPM | BODY MASS INDEX: 37.22 KG/M2 | OXYGEN SATURATION: 97 % | HEART RATE: 72 BPM | WEIGHT: 260 LBS | HEIGHT: 70 IN

## 2020-02-21 DIAGNOSIS — S83.92XA SPRAIN OF LEFT KNEE, UNSPECIFIED LIGAMENT, INITIAL ENCOUNTER: Primary | ICD-10-CM

## 2020-02-21 DIAGNOSIS — M25.561 RIGHT KNEE PAIN, UNSPECIFIED CHRONICITY: ICD-10-CM

## 2020-02-21 DIAGNOSIS — M25.562 LEFT KNEE PAIN, UNSPECIFIED CHRONICITY: ICD-10-CM

## 2020-02-21 PROCEDURE — 73564 X-RAY EXAM KNEE 4 OR MORE: CPT | Performed by: ORTHOPAEDIC SURGERY

## 2020-02-21 PROCEDURE — 99203 OFFICE O/P NEW LOW 30 MIN: CPT | Performed by: ORTHOPAEDIC SURGERY

## 2020-02-21 NOTE — H&P
[facility] New Patient Note    CC: Patient presents with:  Consult: left knee pain x 2/11/2020 shoveling snow- locked knees and went to wove and was not able to, sharp pain- entire knee. icing/elevate/rest/heat.  using walker initial/cane.   lateral bacitracin 500 UNIT/GM External Ointment Apply topically 2 (two) times daily.      • mupirocin 2 % External Ointment Apply to affected area three times daily for 7 days 1 g 0   • Albuterol Sulfate HFA (PROAIR HFA) 108 (90 Base) MCG/ACT Inhalation Aero Soln breathing.   Gait: Ambulating with normal nonantalgic gait  Stance: No coronal plane deformity to either knee  Left lower extremity:  • Inspection: skin intact, no lesions, no knee joint effusion  • Palpation: Nontender to palpation about the medial and lat agreement with this plan and will call us for follow-up as needed. The above note was creating using Dragon speech recognition technology. Please excuse any typos.     Idris River MD  [facility]

## 2020-03-04 ENCOUNTER — TELEPHONE (OUTPATIENT)
Dept: FAMILY MEDICINE CLINIC | Facility: CLINIC | Age: 70
End: 2020-03-04

## 2020-03-04 NOTE — TELEPHONE ENCOUNTER
Pt is requesting to talk to the nurse, he wants to know when is he due for his iron test? Please call and advise.

## 2020-03-04 NOTE — TELEPHONE ENCOUNTER
Spoke to pt informed him we have not checked his iron level since 2016. Pt states it must be Dr Sherly Siddiqui office that orders. Pt denies Sx states he just gets checked. He states he will call Dr Naila Dalal.

## 2020-04-15 ENCOUNTER — TELEPHONE (OUTPATIENT)
Dept: HEMATOLOGY/ONCOLOGY | Facility: HOSPITAL | Age: 70
End: 2020-04-15

## 2020-04-15 NOTE — TELEPHONE ENCOUNTER
Andrew Newberry needs a new prescription to give blood. His prior script  in April. He will be mailing the form from [facility] if they can fill it out for him and mail it back to him. If he needs an appointment let him know.

## 2020-04-28 NOTE — TELEPHONE ENCOUNTER
Nikolas Nuñez calling needs precription to give blood. His prior script  .  He said he did mail in form, his cell is [phone] thank you Lazaro Poe

## 2020-05-26 ENCOUNTER — PATIENT MESSAGE (OUTPATIENT)
Dept: FAMILY MEDICINE CLINIC | Facility: CLINIC | Age: 70
End: 2020-05-26

## 2020-05-26 ENCOUNTER — OFFICE VISIT (OUTPATIENT)
Dept: FAMILY MEDICINE CLINIC | Facility: CLINIC | Age: 70
End: 2020-05-26
Payer: MEDICARE

## 2020-05-26 VITALS
OXYGEN SATURATION: 98 % | RESPIRATION RATE: 18 BRPM | SYSTOLIC BLOOD PRESSURE: 126 MMHG | DIASTOLIC BLOOD PRESSURE: 60 MMHG | HEART RATE: 88 BPM | BODY MASS INDEX: 36.36 KG/M2 | WEIGHT: 254 LBS | HEIGHT: 70 IN

## 2020-05-26 DIAGNOSIS — G47.33 OSA ON CPAP: ICD-10-CM

## 2020-05-26 DIAGNOSIS — Z99.89 OSA ON CPAP: ICD-10-CM

## 2020-05-26 DIAGNOSIS — E66.01 SEVERE OBESITY (BMI 35.0-39.9) WITH COMORBIDITY (HCC): ICD-10-CM

## 2020-05-26 DIAGNOSIS — E83.110 HEMOCHROMATOSIS ASSOCIATED WITH COMPOUND HETEROZYGOUS MUTATION IN HFE GENE (HCC): ICD-10-CM

## 2020-05-26 DIAGNOSIS — Z71.6 TOBACCO ABUSE COUNSELING: ICD-10-CM

## 2020-05-26 DIAGNOSIS — Z12.11 SCREENING FOR COLON CANCER: ICD-10-CM

## 2020-05-26 DIAGNOSIS — N18.30 CKD (CHRONIC KIDNEY DISEASE) STAGE 3, GFR 30-59 ML/MIN (HCC): Chronic | ICD-10-CM

## 2020-05-26 DIAGNOSIS — E55.9 VITAMIN D DEFICIENCY: ICD-10-CM

## 2020-05-26 DIAGNOSIS — R53.83 OTHER FATIGUE: ICD-10-CM

## 2020-05-26 DIAGNOSIS — Z00.00 ENCOUNTER FOR ANNUAL HEALTH EXAMINATION: Primary | ICD-10-CM

## 2020-05-26 DIAGNOSIS — E78.00 PURE HYPERCHOLESTEROLEMIA: ICD-10-CM

## 2020-05-26 DIAGNOSIS — Q98.4 KLINEFELTER SYNDROME: ICD-10-CM

## 2020-05-26 DIAGNOSIS — Z12.5 SCREENING PSA (PROSTATE SPECIFIC ANTIGEN): ICD-10-CM

## 2020-05-26 DIAGNOSIS — D69.6 THROMBOCYTOPENIA (HCC): ICD-10-CM

## 2020-05-26 PROBLEM — M23.91 INTERNAL DERANGEMENT OF KNEE, RIGHT: Status: RESOLVED | Noted: 2018-08-14 | Resolved: 2020-05-26

## 2020-05-26 PROCEDURE — 96160 PT-FOCUSED HLTH RISK ASSMT: CPT | Performed by: FAMILY MEDICINE

## 2020-05-26 PROCEDURE — G0009 ADMIN PNEUMOCOCCAL VACCINE: HCPCS | Performed by: FAMILY MEDICINE

## 2020-05-26 PROCEDURE — 90732 PPSV23 VACC 2 YRS+ SUBQ/IM: CPT | Performed by: FAMILY MEDICINE

## 2020-05-26 PROCEDURE — 99397 PER PM REEVAL EST PAT 65+ YR: CPT | Performed by: FAMILY MEDICINE

## 2020-05-26 PROCEDURE — G0439 PPPS, SUBSEQ VISIT: HCPCS | Performed by: FAMILY MEDICINE

## 2020-05-26 NOTE — PATIENT INSTRUCTIONS
Roel Araujo's SCREENING SCHEDULE   Tests on this list are recommended by your physician but may not be covered, or covered at this frequency, by your insurer. Please check with your insurance carrier before scheduling to verify coverage.     PREV Electrocardiogram date06/10/2017 Routine EKG is not a screening covered service except at the Welcome to Medicare Visit    Abdominal aortic aneurysm screening (once between ages 73-68)  No results found for this or any previous visit.  Limited to patients w once after your 65th birthday    Pneumococcal 23 (Pneumovax)  Covered Once after 65 No orders found for this or any previous visit.  Please get once after your 65th birthday    Hepatitis B for Moderate/High Risk No orders found for this or any previous visi

## 2020-05-26 NOTE — PROGRESS NOTES
HPI:   Tomeka Hamilton is a 81year old male who presents for a MA (Medicare Advantage) [facility] (Once per calendar year).       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 Vitamin B-12 (VITAMIN B12) 1000 MCG Oral Tab, Take 1,000 mcg by mouth daily. Pyridoxine HCl (VITAMIN B-6) 100 MG Oral Tab, Take 100 mg by mouth daily.   Polyethyl Glycol-Propyl Glycol (SYSTANE) 0.4-0.3 % Op SpO2 98%   BMI 36.45 kg/m²   Estimated body mass index is 36.45 kg/m² as calculated from the following:    Height as of this encounter: 70\". Weight as of this encounter: 254 lb (115.2 kg).     Medicare Hearing Assessment  (Required for AWV/SWV)    [facility] CLINIC 11/25/2016   • >=3 YRS TRI  MULTIDOSE VIAL (33670) FLU CLINIC 11/11/2014   • FLU VACC High Dose 65 YRS & Older PRSV Free (91511) 09/20/2019   • FLUAD High Dose 72 yr and older (93088) 10/27/2017, 10/11/2018   • Influenza 10/27/2017   • Pneumococcal 5/26/2020     General Health     In the past six months, have you lost more than 10 pounds without trying?: (P) 2 - No  Has your appetite been poor?: (P) No  How would you describe your daily physical activity?: (P) Light  How would you describe your curre after 65 10/27/2017 Please get once after your 65th birthday    Pneumococcal 23 (Pneumovax)  Covered Once after 65 10/14/2010 Please get once after your 65th birthday    Hepatitis B for Moderate/High Risk No vaccine history found Medium/high risk factors:

## 2020-05-27 ENCOUNTER — LAB ENCOUNTER (OUTPATIENT)
Dept: LAB | Facility: HOSPITAL | Age: 70
End: 2020-05-27
Attending: FAMILY MEDICINE
Payer: MEDICARE

## 2020-05-27 DIAGNOSIS — Z12.11 SCREENING FOR COLON CANCER: ICD-10-CM

## 2020-05-27 PROCEDURE — 82274 ASSAY TEST FOR BLOOD FECAL: CPT

## 2020-05-27 NOTE — TELEPHONE ENCOUNTER
From: Bree Hunter  To: Maddie Tan DO  Sent: 5/26/2020 5:34 PM CDT  Subject: Visit Follow-up Question    Question on your After Visit Summary. You Show Me As Having \"Thrombocytopenia\". I believe this is an Error.   I think you misunderstood,

## 2020-06-17 ENCOUNTER — LABORATORY ENCOUNTER (OUTPATIENT)
Dept: LAB | Age: 70
End: 2020-06-17
Attending: FAMILY MEDICINE
Payer: MEDICARE

## 2020-06-17 DIAGNOSIS — Z12.5 SCREENING PSA (PROSTATE SPECIFIC ANTIGEN): ICD-10-CM

## 2020-06-17 DIAGNOSIS — N18.30 CKD (CHRONIC KIDNEY DISEASE) STAGE 3, GFR 30-59 ML/MIN (HCC): Chronic | ICD-10-CM

## 2020-06-17 DIAGNOSIS — E78.00 PURE HYPERCHOLESTEROLEMIA: ICD-10-CM

## 2020-06-17 DIAGNOSIS — R73.09 ELEVATED GLUCOSE: ICD-10-CM

## 2020-06-17 DIAGNOSIS — E55.9 VITAMIN D DEFICIENCY: ICD-10-CM

## 2020-06-17 DIAGNOSIS — D69.6 THROMBOCYTOPENIA (HCC): ICD-10-CM

## 2020-06-17 DIAGNOSIS — E83.110 HEMOCHROMATOSIS ASSOCIATED WITH COMPOUND HETEROZYGOUS MUTATION IN HFE GENE (HCC): ICD-10-CM

## 2020-06-17 DIAGNOSIS — R53.83 OTHER FATIGUE: ICD-10-CM

## 2020-06-17 PROCEDURE — 83550 IRON BINDING TEST: CPT

## 2020-06-17 PROCEDURE — 82728 ASSAY OF FERRITIN: CPT

## 2020-06-17 PROCEDURE — 85025 COMPLETE CBC W/AUTO DIFF WBC: CPT

## 2020-06-17 PROCEDURE — 80053 COMPREHEN METABOLIC PANEL: CPT

## 2020-06-17 PROCEDURE — 80061 LIPID PANEL: CPT

## 2020-06-17 PROCEDURE — 36415 COLL VENOUS BLD VENIPUNCTURE: CPT

## 2020-06-17 PROCEDURE — 84439 ASSAY OF FREE THYROXINE: CPT

## 2020-06-17 PROCEDURE — 83540 ASSAY OF IRON: CPT

## 2020-06-17 PROCEDURE — 82607 VITAMIN B-12: CPT

## 2020-06-17 PROCEDURE — 82306 VITAMIN D 25 HYDROXY: CPT

## 2020-06-17 PROCEDURE — 83036 HEMOGLOBIN GLYCOSYLATED A1C: CPT

## 2020-06-17 PROCEDURE — 84443 ASSAY THYROID STIM HORMONE: CPT

## 2020-06-18 DIAGNOSIS — R73.09 ELEVATED GLUCOSE: Primary | ICD-10-CM

## 2020-08-26 ENCOUNTER — OFFICE VISIT (OUTPATIENT)
Dept: FAMILY MEDICINE CLINIC | Facility: CLINIC | Age: 70
End: 2020-08-26
Payer: MEDICARE

## 2020-08-26 VITALS
BODY MASS INDEX: 36.65 KG/M2 | WEIGHT: 256 LBS | OXYGEN SATURATION: 99 % | TEMPERATURE: 98 F | RESPIRATION RATE: 16 BRPM | SYSTOLIC BLOOD PRESSURE: 124 MMHG | DIASTOLIC BLOOD PRESSURE: 68 MMHG | HEART RATE: 88 BPM | HEIGHT: 70 IN

## 2020-08-26 DIAGNOSIS — L02.31 ABSCESS OF LEFT BUTTOCK: Primary | ICD-10-CM

## 2020-08-26 PROCEDURE — 3008F BODY MASS INDEX DOCD: CPT | Performed by: PHYSICIAN ASSISTANT

## 2020-08-26 PROCEDURE — 3078F DIAST BP <80 MM HG: CPT | Performed by: PHYSICIAN ASSISTANT

## 2020-08-26 PROCEDURE — 3074F SYST BP LT 130 MM HG: CPT | Performed by: PHYSICIAN ASSISTANT

## 2020-08-26 PROCEDURE — 99213 OFFICE O/P EST LOW 20 MIN: CPT | Performed by: PHYSICIAN ASSISTANT

## 2020-08-26 RX ORDER — DOXYCYCLINE HYCLATE 100 MG/1
100 CAPSULE ORAL 2 TIMES DAILY
Qty: 14 CAPSULE | Refills: 0 | Status: SHIPPED | OUTPATIENT
Start: 2020-08-26 | End: 2020-11-04 | Stop reason: ALTCHOICE

## 2020-08-26 NOTE — PROGRESS NOTES
HPI:    Patient ID: Taj Ceballos is a 81year old male. HPI   Patient with history of hidradenitis suppurativa of the buttock region presents with a flareup of symptoms.   In the last couple weeks he has had 2 painful erythematous abscesses on th • Polyethyl Glycol-Propyl Glycol (SYSTANE) 0.4-0.3 % Ophthalmic Gel Apply  to eye. • Aspirin (ASPIR-81 OR) Take 81 mg by mouth daily. • Coenzyme Q10 (COQ-10) 200 MG Oral Cap Take  by mouth daily.      • Folic Acid 0.8 MG Oral Cap Take  by mouth da

## 2020-08-27 ENCOUNTER — TELEPHONE (OUTPATIENT)
Dept: FAMILY MEDICINE CLINIC | Facility: CLINIC | Age: 70
End: 2020-08-27

## 2020-08-27 NOTE — TELEPHONE ENCOUNTER
Patient had a cyst drained yesterday by LR. He was told to change the bandage. He would like to come into the office to have it changed. He is concerned that he will not have the proper size coverage once he opens it up.       I think he would feel rubia

## 2020-08-27 NOTE — TELEPHONE ENCOUNTER
Left detailed message for pt to change the bandage daily. To call with any other questions or concerns.

## 2020-09-18 ENCOUNTER — OFFICE VISIT (OUTPATIENT)
Dept: FAMILY MEDICINE CLINIC | Facility: CLINIC | Age: 70
End: 2020-09-18
Payer: MEDICARE

## 2020-09-18 ENCOUNTER — TELEPHONE (OUTPATIENT)
Dept: FAMILY MEDICINE CLINIC | Facility: CLINIC | Age: 70
End: 2020-09-18

## 2020-09-18 VITALS
RESPIRATION RATE: 16 BRPM | WEIGHT: 254 LBS | HEART RATE: 68 BPM | OXYGEN SATURATION: 97 % | TEMPERATURE: 97 F | DIASTOLIC BLOOD PRESSURE: 70 MMHG | BODY MASS INDEX: 36.36 KG/M2 | HEIGHT: 70 IN | SYSTOLIC BLOOD PRESSURE: 122 MMHG

## 2020-09-18 DIAGNOSIS — N63.20 BREAST MASS, LEFT: Primary | ICD-10-CM

## 2020-09-18 DIAGNOSIS — I10 ESSENTIAL HYPERTENSION, BENIGN: ICD-10-CM

## 2020-09-18 DIAGNOSIS — N63.20 LEFT BREAST MASS: Primary | ICD-10-CM

## 2020-09-18 DIAGNOSIS — N63.0 BREAST MASS IN MALE: ICD-10-CM

## 2020-09-18 PROCEDURE — G0008 ADMIN INFLUENZA VIRUS VAC: HCPCS | Performed by: FAMILY MEDICINE

## 2020-09-18 PROCEDURE — 3008F BODY MASS INDEX DOCD: CPT | Performed by: FAMILY MEDICINE

## 2020-09-18 PROCEDURE — 3074F SYST BP LT 130 MM HG: CPT | Performed by: FAMILY MEDICINE

## 2020-09-18 PROCEDURE — 99213 OFFICE O/P EST LOW 20 MIN: CPT | Performed by: FAMILY MEDICINE

## 2020-09-18 PROCEDURE — 90662 IIV NO PRSV INCREASED AG IM: CPT | Performed by: FAMILY MEDICINE

## 2020-09-18 PROCEDURE — 3078F DIAST BP <80 MM HG: CPT | Performed by: FAMILY MEDICINE

## 2020-09-18 RX ORDER — FUROSEMIDE 20 MG/1
20 TABLET ORAL DAILY
Qty: 90 TABLET | Refills: 3 | Status: SHIPPED | OUTPATIENT
Start: 2020-09-18 | End: 2021-05-03

## 2020-09-18 NOTE — PROGRESS NOTES
Arrives with his wife relatively recent onset of thickened tissue directly underneath the left areola. It is nondraining noninflamed no adjacent lymphadenopathy in the axilla. He has had breast nodules in the past uncertain outcome.   No family history of

## 2020-09-21 ENCOUNTER — TELEPHONE (OUTPATIENT)
Dept: FAMILY MEDICINE CLINIC | Facility: CLINIC | Age: 70
End: 2020-09-21

## 2020-09-21 ENCOUNTER — HOSPITAL ENCOUNTER (OUTPATIENT)
Dept: MAMMOGRAPHY | Facility: HOSPITAL | Age: 70
Discharge: HOME | End: 2020-09-21
Attending: FAMILY MEDICINE
Payer: MEDICARE

## 2020-09-21 DIAGNOSIS — N63.20 LEFT BREAST MASS: ICD-10-CM

## 2020-09-21 DIAGNOSIS — R92.8 ABNORMAL MAMMOGRAM: Primary | ICD-10-CM

## 2020-09-21 PROCEDURE — 77066 DX MAMMO INCL CAD BI: CPT | Performed by: FAMILY MEDICINE

## 2020-09-21 PROCEDURE — 76642 ULTRASOUND BREAST LIMITED: CPT | Performed by: FAMILY MEDICINE

## 2020-09-21 PROCEDURE — 77062 BREAST TOMOSYNTHESIS BI: CPT | Performed by: FAMILY MEDICINE

## 2020-09-21 NOTE — TELEPHONE ENCOUNTER
Radiology called wants Dr Ashley Dunaway to see Melecio results and put orders inf for biopsy asap so pt can schedule.

## 2020-09-21 NOTE — IMAGING NOTE
Assisted Dr Sanjana Hopper with left breast and left axillary lymph node biopsy recommendation, BIRADS 5. Explained procedure and written instructions given. Pt screened and denies blood thinners, bleeding issues, chemo or liver disease.  Reviewed to eat and take 1

## 2020-09-25 ENCOUNTER — TELEPHONE (OUTPATIENT)
Dept: FAMILY MEDICINE CLINIC | Facility: CLINIC | Age: 70
End: 2020-09-25

## 2020-09-25 NOTE — TELEPHONE ENCOUNTER
Wants to know if we can release the test results for his Mammo into My Chart? He wants to view them. He can not see them. Pls call him to advise.

## 2020-09-28 ENCOUNTER — TELEPHONE (OUTPATIENT)
Dept: MAMMOGRAPHY | Facility: HOSPITAL | Age: 70
End: 2020-09-28

## 2020-09-28 NOTE — TELEPHONE ENCOUNTER
Returning voice mail left over the weekend. Pt asking if 2 separate incisions for biopsy and reporting very nervous. Questions asked and answered. Emotional support provided.

## 2020-10-01 ENCOUNTER — HOSPITAL ENCOUNTER (OUTPATIENT)
Dept: MAMMOGRAPHY | Facility: HOSPITAL | Age: 70
Discharge: HOME | End: 2020-10-01
Attending: FAMILY MEDICINE
Payer: MEDICARE

## 2020-10-01 ENCOUNTER — APPOINTMENT (OUTPATIENT)
Dept: MAMMOGRAPHY | Facility: HOSPITAL | Age: 70
End: 2020-10-01
Attending: FAMILY MEDICINE
Payer: MEDICARE

## 2020-10-01 ENCOUNTER — TELEPHONE (OUTPATIENT)
Dept: MAMMOGRAPHY | Facility: HOSPITAL | Age: 70
End: 2020-10-01

## 2020-10-01 DIAGNOSIS — N63.20 LEFT BREAST MASS: ICD-10-CM

## 2020-10-01 DIAGNOSIS — R92.8 ABNORMAL MAMMOGRAM: ICD-10-CM

## 2020-10-01 PROCEDURE — 87075 CULTR BACTERIA EXCEPT BLOOD: CPT | Performed by: FAMILY MEDICINE

## 2020-10-01 PROCEDURE — 87070 CULTURE OTHR SPECIMN AEROBIC: CPT | Performed by: FAMILY MEDICINE

## 2020-10-01 PROCEDURE — 87205 SMEAR GRAM STAIN: CPT | Performed by: FAMILY MEDICINE

## 2020-10-01 PROCEDURE — 87076 CULTURE ANAEROBE IDENT EACH: CPT | Performed by: FAMILY MEDICINE

## 2020-10-01 PROCEDURE — 88312 SPECIAL STAINS GROUP 1: CPT | Performed by: FAMILY MEDICINE

## 2020-10-01 PROCEDURE — 87077 CULTURE AEROBIC IDENTIFY: CPT | Performed by: FAMILY MEDICINE

## 2020-10-01 PROCEDURE — 19000 PUNCTURE ASPIR CYST BREAST: CPT | Performed by: FAMILY MEDICINE

## 2020-10-01 PROCEDURE — 19083 BX BREAST 1ST LESION US IMAG: CPT | Performed by: FAMILY MEDICINE

## 2020-10-01 PROCEDURE — 76942 ECHO GUIDE FOR BIOPSY: CPT | Performed by: FAMILY MEDICINE

## 2020-10-01 PROCEDURE — 77065 DX MAMMO INCL CAD UNI: CPT | Performed by: FAMILY MEDICINE

## 2020-10-01 PROCEDURE — 38505 NEEDLE BIOPSY LYMPH NODES: CPT | Performed by: FAMILY MEDICINE

## 2020-10-01 PROCEDURE — 88342 IMHCHEM/IMCYTCHM 1ST ANTB: CPT | Performed by: FAMILY MEDICINE

## 2020-10-01 PROCEDURE — 88341 IMHCHEM/IMCYTCHM EA ADD ANTB: CPT | Performed by: FAMILY MEDICINE

## 2020-10-01 PROCEDURE — 88305 TISSUE EXAM BY PATHOLOGIST: CPT | Performed by: FAMILY MEDICINE

## 2020-10-01 PROCEDURE — 88108 CYTOPATH CONCENTRATE TECH: CPT | Performed by: FAMILY MEDICINE

## 2020-10-01 NOTE — TELEPHONE ENCOUNTER
Pt called re: post-biopsy questions. I reviewed all of his d/c instructions verbally over the phone and answered his questions re: ace wrap and resuming his home meds. I explained we will f/u with him next week for results. He thanked me for my help.

## 2020-10-05 ENCOUNTER — OFFICE VISIT (OUTPATIENT)
Dept: SURGERY | Facility: CLINIC | Age: 70
End: 2020-10-05
Payer: MEDICARE

## 2020-10-05 ENCOUNTER — OFFICE VISIT (OUTPATIENT)
Dept: FAMILY MEDICINE CLINIC | Facility: CLINIC | Age: 70
End: 2020-10-05
Payer: MEDICARE

## 2020-10-05 VITALS
HEART RATE: 91 BPM | HEIGHT: 70 IN | WEIGHT: 257 LBS | OXYGEN SATURATION: 97 % | SYSTOLIC BLOOD PRESSURE: 122 MMHG | TEMPERATURE: 97 F | DIASTOLIC BLOOD PRESSURE: 72 MMHG | BODY MASS INDEX: 36.79 KG/M2 | RESPIRATION RATE: 20 BRPM

## 2020-10-05 VITALS
DIASTOLIC BLOOD PRESSURE: 72 MMHG | TEMPERATURE: 97 F | HEIGHT: 70 IN | SYSTOLIC BLOOD PRESSURE: 122 MMHG | BODY MASS INDEX: 36.79 KG/M2 | WEIGHT: 257 LBS

## 2020-10-05 DIAGNOSIS — N61.1 BREAST ABSCESS IN MALE: Primary | ICD-10-CM

## 2020-10-05 DIAGNOSIS — N61.0 ACUTE MASTITIS OF LEFT BREAST: Primary | ICD-10-CM

## 2020-10-05 PROCEDURE — 3074F SYST BP LT 130 MM HG: CPT | Performed by: FAMILY MEDICINE

## 2020-10-05 PROCEDURE — 3008F BODY MASS INDEX DOCD: CPT | Performed by: SURGERY

## 2020-10-05 PROCEDURE — 99203 OFFICE O/P NEW LOW 30 MIN: CPT | Performed by: SURGERY

## 2020-10-05 PROCEDURE — 3074F SYST BP LT 130 MM HG: CPT | Performed by: SURGERY

## 2020-10-05 PROCEDURE — 3008F BODY MASS INDEX DOCD: CPT | Performed by: FAMILY MEDICINE

## 2020-10-05 PROCEDURE — 3078F DIAST BP <80 MM HG: CPT | Performed by: SURGERY

## 2020-10-05 PROCEDURE — 3078F DIAST BP <80 MM HG: CPT | Performed by: FAMILY MEDICINE

## 2020-10-05 PROCEDURE — 99214 OFFICE O/P EST MOD 30 MIN: CPT | Performed by: FAMILY MEDICINE

## 2020-10-05 RX ORDER — CIPROFLOXACIN 500 MG/1
500 TABLET, FILM COATED ORAL 2 TIMES DAILY
Qty: 20 TABLET | Refills: 0 | Status: SHIPPED | OUTPATIENT
Start: 2020-10-05 | End: 2020-10-15

## 2020-10-05 NOTE — PROGRESS NOTES
Patient is here with a tender warm lesion inferior lateral quadrant of the left breast.  He was operated October 1 for an I&D and so far anaerobic type penicillin type bacteria are the only bacteria retrieved on biopsy.   On today's exam he is afebrile ther

## 2020-10-05 NOTE — H&P
New Patient Visit Note       Active Problems      1. Acute mastitis of left breast        Chief Complaint   Patient presents with:  Breast Problem: NP - PER Jurgen AND PCP PT SEEN IN OFFICE TODAY FOR BREAST ABSCESS.  PT states that he had a BX but area was inf CPAP (continuous positive airway pressure) dependence    • Hereditary hemochromatosis ([facility] 01/2009   • Influenza A (H1N1) 1/7/2014   • Kidney calculi    • Meralgia paresthetica 9/11/2013   • Plantar fasciitis 7/18/2013   • Trigger finger 6/3/2014     Past External Ointment, Apply to affected area three times daily for 7 days, Disp: 1 g, Rfl: 0  •  Albuterol Sulfate HFA (PROAIR HFA) 108 (90 Base) MCG/ACT Inhalation Aero Soln, Inhale 2 puffs into the lungs every 4 (four) hours as needed for Wheezing., Disp: 3 Psychiatric/Behavioral: Negative for behavioral problems and sleep disturbance.        Physical Findings   /72   Temp 97.2 °F (36.2 °C)   Ht 70\"   Wt 257 lb (116.6 kg)   BMI 36.88 kg/m²   Physical Exam   Constitutional: He is oriented to person, pl and vitals reviewed. MAMMOGRAM   I personally viewed the films and agree with the radiologist's interpretation.     FINDINGS:  There is a palpable mass in the retroareolar aspect of the left breast.  This is solid sonographically measuring 3.1 x 3.0 performed utilizing a 14 gauge core cutting needle. Multiple samples were obtained using the vacuum-assisted device. No clip was then deployed. The procedure was tolerated well by the patient with no immediate complications.   All cores were submi

## 2020-10-07 ENCOUNTER — OFFICE VISIT (OUTPATIENT)
Dept: SURGERY | Facility: CLINIC | Age: 70
End: 2020-10-07
Payer: MEDICARE

## 2020-10-07 VITALS
TEMPERATURE: 98 F | HEART RATE: 84 BPM | SYSTOLIC BLOOD PRESSURE: 124 MMHG | DIASTOLIC BLOOD PRESSURE: 73 MMHG | RESPIRATION RATE: 20 BRPM | WEIGHT: 257 LBS | BODY MASS INDEX: 37 KG/M2

## 2020-10-07 DIAGNOSIS — N61.1 LEFT BREAST ABSCESS: Primary | ICD-10-CM

## 2020-10-07 PROCEDURE — 99213 OFFICE O/P EST LOW 20 MIN: CPT | Performed by: SURGERY

## 2020-10-07 PROCEDURE — 10060 I&D ABSCESS SIMPLE/SINGLE: CPT | Performed by: SURGERY

## 2020-10-07 PROCEDURE — 3078F DIAST BP <80 MM HG: CPT | Performed by: SURGERY

## 2020-10-07 PROCEDURE — 3074F SYST BP LT 130 MM HG: CPT | Performed by: SURGERY

## 2020-10-07 NOTE — PROCEDURES
Pre op Diagnosis: Left breast abscess  Post op Diagnosis:  Left breast abscess   Procedure: Aspiration of left breast abscess  Surgeon: Dr. Thierry Torres   Anesthesia: 1% lidocaine   Operative Findings:   The patient was found to have a left breast absc

## 2020-10-07 NOTE — PROGRESS NOTES
Follow Up Visit Note       Active Problems      1.  Left breast abscess          Chief Complaint   Patient presents with:  Breast Problem: f/u left breast mastitis        History of Present Illness  The patient is a 19-year-old gentleman who follows up brandy drinks        Comment: social       Drug use: No    Other Topics      Concerns:        Caffeine Concern: Yes        Exercise: No       Current Outpatient Medications:   •  Ciprofloxacin HCl (CIPRO) 500 MG Oral Tab, Take 1 tablet (500 mg total) by mouth 2 ( today.  Review of Systems   Constitutional: Negative for chills, diaphoresis, fatigue, fever and unexpected weight change. HENT: Negative for hearing loss, nosebleeds, sore throat and trouble swallowing.     Respiratory: Negative for apnea, cough, shortne comment)     Electronically signed by Emily Ramos MD        Assessment   Left breast abscess  (primary encounter diagnosis)    Plan   · I performed an ultrasound of the patient's left breast at the bedside.   A fluid collection was identifi

## 2020-10-09 LAB
% SATURATION: 31 % (CALC) (ref 15–60)
% SATURATION: 40 % (CALC) (ref 15–60)
ALBUMIN/GLOB SERPL: 1.3 (CALC) (ref 1–2.5)
ALBUMIN/GLOB SERPL: 1.3 (CALC) (ref 1–2.5)
ALBUMIN/GLOB SERPL: 1.4 (CALC) (ref 1–2.5)
ALBUMIN/GLOB SERPL: 1.4 (CALC) (ref 1–2.5)
ALBUMIN: 3.9 G/DL (ref 3.6–5.1)
ALBUMIN: 4.2 G/DL (ref 3.6–5.1)
ALBUMIN: 4.2 G/DL (ref 3.6–5.1)
ALBUMIN: 4.3 G/DL (ref 3.6–5.1)
ALKALINE PHOSPHATASE: 45 UNIT/L (ref 40–115)
ALKALINE PHOSPHATASE: 46 UNIT/L (ref 40–115)
ALKALINE PHOSPHATASE: 46 UNIT/L (ref 40–115)
ALKALINE PHOSPHATASE: 50 UNIT/L (ref 40–115)
ALT: 18 UNIT/L (ref 9–46)
ALT: 20 UNIT/L (ref 9–46)
ALT: 22 UNIT/L (ref 9–46)
ALT: 23 UNIT/L (ref 9–46)
AST: 16 UNIT/L (ref 10–35)
AST: 18 UNIT/L (ref 10–35)
AST: 18 UNIT/L (ref 10–35)
AST: 19 UNIT/L (ref 10–35)
BASO%: 0.1 %
BASO%: 0.2 %
BASO%: 0.2 %
BASO%: 0.3 %
BASO: 0 10^3/UL
BILIRUBIN, TOTAL: 0.4 MG/DL (ref 0.2–1.2)
BILIRUBIN, TOTAL: 0.5 MG/DL (ref 0.2–1.2)
BUN/CREATININE RATIO: ABNORMAL (CALC) (ref 6–22)
BUN/CREATININE RATIO: NORMAL (CALC) (ref 6–22)
CALCIUM: 9.3 MG/DL (ref 8.6–10.3)
CALCIUM: 9.7 MG/DL (ref 8.6–10.3)
CALCIUM: 9.8 MG/DL (ref 8.6–10.3)
CALCIUM: 9.8 MG/DL (ref 8.6–10.3)
CARBON DIOXIDE: 22 MMOL/L (ref 20–31)
CARBON DIOXIDE: 24 MMOL/L (ref 20–31)
CARBON DIOXIDE: 24 MMOL/L (ref 20–32)
CARBON DIOXIDE: 25 MMOL/L (ref 20–31)
CHLORIDE: 101 MMOL/L (ref 98–110)
CHLORIDE: 102 MMOL/L (ref 98–110)
CHLORIDE: 103 MMOL/L (ref 98–110)
CHLORIDE: 105 MMOL/L (ref 98–110)
CRCL (C&G) (MOSAIQ HL): 103.05 ML/MIN
CRCL (C&G) (MOSAIQ HL): 121.57 ML/MIN
CRCL (C&G) (MOSAIQ HL): 98.15 ML/MIN
CRCL (C&G) (MOSAIQ HL): 99.78 ML/MIN
CREATININE CLEARANCE (MOSAIQ HL): 52.3 ML/MIN
CREATININE CLEARANCE (MOSAIQ HL): 52.8 ML/MIN
CREATININE CLEARANCE (MOSAIQ HL): 53.4 ML/MIN
CREATININE CLEARANCE (MOSAIQ HL): 64.6 ML/MIN
CREATININE: 0.96 MG/DL (ref 0.7–1.25)
CREATININE: 1.16 MG/DL (ref 0.7–1.25)
CREATININE: 1.19 MG/DL (ref 0.7–1.25)
CREATININE: 1.2 MG/DL (ref 0.7–1.25)
EGFR AFRICAN AMERICAN: 74 ML/MIN/1.73M2
EGFR AFRICAN AMERICAN: 74 ML/MIN/1.73M2
EGFR AFRICAN AMERICAN: 76 ML/MIN/1.73M2
EGFR AFRICAN AMERICAN: 96 ML/MIN/1.73M2
EGFR NON-AFR. AMERICAN: 64 ML/MIN/1.73M2
EGFR NON-AFR. AMERICAN: 64 ML/MIN/1.73M2
EGFR NON-AFR. AMERICAN: 66 ML/MIN/1.73M2
EGFR NON-AFR. AMERICAN: 83 ML/MIN/1.73M2
EOS%: 1.1 %
EOS%: 1.4 %
EOS%: 1.7 %
EOS%: 1.7 %
EOS: 0.1 10^3/UL
FERRITIN: 43 NG/ML (ref 20–380)
FERRITIN: 48 NG/ML (ref 20–380)
FERRITIN: 52 NG/ML (ref 20–380)
FERRITIN: 61 NG/ML (ref 20–380)
GLOBULIN: 3 G/DL (CALC) (ref 1.9–3.7)
GLOBULIN: 3.1 G/DL (CALC) (ref 1.9–3.7)
GLOBULIN: 3.1 G/DL (CALC) (ref 1.9–3.7)
GLOBULIN: 3.2 G/DL (CALC) (ref 1.9–3.7)
GLUCOSE: 100 MG/DL (ref 65–99)
GLUCOSE: 101 MG/DL (ref 65–99)
GLUCOSE: 104 MG/DL (ref 65–99)
GLUCOSE: 92 MG/DL (ref 65–99)
HCT: 39.4 % (ref 38–54)
HCT: 41.5 % (ref 38–54)
HCT: 42.8 % (ref 38–54)
HCT: 44.1 % (ref 38–54)
HGB: 13.8 G/DL (ref 12–18)
HGB: 14.5 G/DL (ref 12–18)
HGB: 14.6 G/DL (ref 12–18)
HGB: 14.9 G/DL (ref 12–18)
IRON BINDING CAPACITY: 298 MCG/DL (CALC) (ref 250–425)
IRON BINDING CAPACITY: 318 MCG/DL (CALC) (ref 250–425)
IRON BINDING CAPACITY: 327 MCG/DL (CALC) (ref 250–425)
IRON BINDING CAPACITY: 330 MCG/DL (CALC) (ref 250–425)
IRON, TOTAL: 102 MCG/DL (ref 50–180)
IRON, TOTAL: 120 MCG/DL (ref 50–180)
IRON, TOTAL: 127 MCG/DL (ref 50–180)
IRON, TOTAL: 131 MCG/DL (ref 50–180)
LYMPH%: 29.2 % (ref 12–44)
LYMPH%: 29.4 % (ref 12–44)
LYMPH%: 31.5 % (ref 12–44)
LYMPH%: 32.2 % (ref 12–44)
LYMPH: 1.9 10^3/UL (ref 0.8–2.8)
LYMPH: 2 10^3/UL (ref 0.8–2.8)
LYMPH: 2.2 10^3/UL (ref 0.8–2.8)
LYMPH: 2.4 10^3/UL (ref 0.8–2.8)
MCH: 32.6 PG (ref 26–33)
MCH: 32.7 PG (ref 26–33)
MCH: 33 PG (ref 26–33)
MCH: 33.4 PG (ref 26–33)
MCHC: 33.8 G/DL (ref 31–36)
MCHC: 34.1 G/DL (ref 31–36)
MCHC: 34.9 G/DL (ref 31–36)
MCHC: 35 G/DL (ref 31–36)
MCV: 94.5 FML (ref 82–100)
MCV: 95.4 FML (ref 82–100)
MCV: 95.7 FML (ref 82–100)
MCV: 96.5 FML (ref 82–100)
MONO%: 10.3 % (ref 2–12)
MONO%: 11.6 % (ref 2–12)
MONO%: 12.2 % (ref 2–12)
MONO%: 12.6 % (ref 2–12)
MONO: 0.7 10^3/UL (ref 0.2–1)
MONO: 0.7 10^3/UL (ref 0.2–1)
MONO: 0.8 10^3/UL (ref 0.2–1)
MONO: 1 10^3/UL (ref 0.2–1)
MPV: 10 FML (ref 8.6–11.7)
MPV: 10.2 FML (ref 8.6–11.7)
MPV: 9.7 FML (ref 8.6–11.7)
MPV: 9.9 FML (ref 8.6–11.7)
NEUT%: 53.7 % (ref 47–76)
NEUT%: 55.4 % (ref 47–76)
NEUT%: 56.9 % (ref 47–76)
NEUT%: 58.3 % (ref 47–76)
NEUT: 3.3 10^3/UL (ref 1.5–7.1)
NEUT: 3.7 10^3/UL (ref 1.5–7.1)
NEUT: 3.9 10^3/UL (ref 1.5–7.1)
NEUT: 4.7 10^3/UL (ref 1.5–7.1)
PLT: 156 10^3/UL (ref 150–375)
PLT: 162 10^3/UL (ref 150–375)
PLT: 169 10^3/UL (ref 150–375)
PLT: 174 10^3/UL (ref 150–375)
POTASSIUM: 3.6 MMOL/L (ref 3.5–5.3)
POTASSIUM: 3.6 MMOL/L (ref 3.5–5.3)
POTASSIUM: 3.8 MMOL/L (ref 3.5–5.3)
POTASSIUM: 4.1 MMOL/L (ref 3.5–5.3)
PROTEIN, TOTAL: 6.9 G/DL (ref 6.1–8.1)
PROTEIN, TOTAL: 7.3 G/DL (ref 6.1–8.1)
PROTEIN, TOTAL: 7.4 G/DL (ref 6.1–8.1)
PROTEIN, TOTAL: 7.4 G/DL (ref 6.1–8.1)
RBC: 4.13 10^6/UL (ref 4.2–6.2)
RBC: 4.39 10^6/UL (ref 4.2–6.2)
RBC: 4.47 10^6/UL (ref 4.2–6.2)
RBC: 4.57 10^6/UL (ref 4.2–6.2)
RDW-CV: 13.2 %
RDW-CV: 13.4 %
RDW-CV: 13.6 %
RDW-CV: 13.8 %
RDW-SD: 44.1 FML (ref 36–50)
RDW-SD: 45.6 FML (ref 36–50)
RDW-SD: 46.1 FML (ref 36–50)
RDW-SD: 47.4 FML (ref 36–50)
SODIUM: 136 MMOL/L (ref 135–146)
SODIUM: 137 MMOL/L (ref 135–146)
SODIUM: 138 MMOL/L (ref 135–146)
SODIUM: 139 MMOL/L (ref 135–146)
UREA NITROGEN (BUN): 16 MG/DL (ref 7–25)
UREA NITROGEN (BUN): 16 MG/DL (ref 7–25)
UREA NITROGEN (BUN): 17 MG/DL (ref 7–25)
UREA NITROGEN (BUN): 21 MG/DL (ref 7–25)
WBC: 6.1 10^3/UL (ref 4.3–11)
WBC: 6.3 10^3/UL (ref 4.3–11)
WBC: 7 10^3/UL (ref 4.3–11)
WBC: 8.3 10^3/UL (ref 4.3–11)

## 2020-10-11 VITALS
BODY MASS INDEX: 35.5 KG/M2 | SYSTOLIC BLOOD PRESSURE: 122 MMHG | DIASTOLIC BLOOD PRESSURE: 62 MMHG | HEIGHT: 70 IN | WEIGHT: 248 LBS

## 2020-10-11 VITALS
SYSTOLIC BLOOD PRESSURE: 124 MMHG | BODY MASS INDEX: 36.22 KG/M2 | WEIGHT: 253 LBS | HEIGHT: 70 IN | DIASTOLIC BLOOD PRESSURE: 66 MMHG

## 2020-10-11 VITALS — WEIGHT: 247 LBS | DIASTOLIC BLOOD PRESSURE: 64 MMHG | SYSTOLIC BLOOD PRESSURE: 122 MMHG

## 2020-10-11 VITALS — DIASTOLIC BLOOD PRESSURE: 60 MMHG | WEIGHT: 245.99 LBS | SYSTOLIC BLOOD PRESSURE: 118 MMHG

## 2020-10-12 ENCOUNTER — OFFICE VISIT (OUTPATIENT)
Dept: SURGERY | Facility: CLINIC | Age: 70
End: 2020-10-12

## 2020-10-12 VITALS — TEMPERATURE: 98 F | DIASTOLIC BLOOD PRESSURE: 72 MMHG | SYSTOLIC BLOOD PRESSURE: 108 MMHG | HEART RATE: 94 BPM

## 2020-10-12 DIAGNOSIS — N61.1 LEFT BREAST ABSCESS: Primary | ICD-10-CM

## 2020-10-12 PROCEDURE — 3074F SYST BP LT 130 MM HG: CPT | Performed by: SURGERY

## 2020-10-12 PROCEDURE — 3078F DIAST BP <80 MM HG: CPT | Performed by: SURGERY

## 2020-10-12 PROCEDURE — 99024 POSTOP FOLLOW-UP VISIT: CPT | Performed by: SURGERY

## 2020-10-12 NOTE — PROGRESS NOTES
Follow Up Visit Note       Active Problems      1. Left breast abscess          Chief Complaint   Patient presents with:  Breast Lump: CONTINUING CARE FOR BREAST MASS. feels much better, c/o occasional pain. denies fever.         History of Present Illness Comment: social       Drug use: No    Other Topics      Concerns:        Caffeine Concern: Yes        Exercise: No       Current Outpatient Medications:   •  Ciprofloxacin HCl (CIPRO) 500 MG Oral Tab, Take 1 tablet (500 mg total) by mouth 2 (two) times chas Systems   Constitutional: Negative for chills, diaphoresis, fatigue, fever and unexpected weight change. HENT: Negative for hearing loss, nosebleeds, sore throat and trouble swallowing.     Respiratory: Negative for apnea, cough, shortness of breath and w agreeable to receive the operation. · He needs to coordinate a  on the day of surgery. He will call the office to schedule the surgery when he is able to coordinate the . · In the meantime, he should continue taking antibiotics.     Sailaja Fraire

## 2020-10-13 ENCOUNTER — TELEPHONE (OUTPATIENT)
Dept: SURGERY | Facility: CLINIC | Age: 70
End: 2020-10-13

## 2020-10-13 RX ORDER — CIPROFLOXACIN 500 MG/1
500 TABLET, FILM COATED ORAL 2 TIMES DAILY
Qty: 20 TABLET | Refills: 0 | Status: SHIPPED | OUTPATIENT
Start: 2020-10-13 | End: 2020-10-23

## 2020-10-21 DIAGNOSIS — Z01.818 PRE-OP TESTING: Primary | ICD-10-CM

## 2020-10-26 ENCOUNTER — APPOINTMENT (OUTPATIENT)
Dept: LAB | Age: 70
End: 2020-10-26
Attending: SURGERY
Payer: MEDICARE

## 2020-10-26 ENCOUNTER — LAB ENCOUNTER (OUTPATIENT)
Dept: LAB | Age: 70
End: 2020-10-26
Attending: SURGERY
Payer: MEDICARE

## 2020-10-26 DIAGNOSIS — Z01.818 PRE-OP TESTING: ICD-10-CM

## 2020-10-26 PROCEDURE — 93005 ELECTROCARDIOGRAM TRACING: CPT

## 2020-10-26 PROCEDURE — 93010 ELECTROCARDIOGRAM REPORT: CPT | Performed by: INTERNAL MEDICINE

## 2020-10-26 PROCEDURE — 36415 COLL VENOUS BLD VENIPUNCTURE: CPT

## 2020-10-26 PROCEDURE — 80053 COMPREHEN METABOLIC PANEL: CPT

## 2020-10-27 ENCOUNTER — APPOINTMENT (OUTPATIENT)
Dept: LAB | Age: 70
End: 2020-10-27
Attending: SURGERY
Payer: MEDICARE

## 2020-10-27 DIAGNOSIS — Z01.818 PRE-PROCEDURAL EXAMINATION: ICD-10-CM

## 2020-10-30 ENCOUNTER — LAB REQUISITION (OUTPATIENT)
Dept: LAB | Facility: HOSPITAL | Age: 70
End: 2020-10-30
Payer: MEDICARE

## 2020-10-30 PROCEDURE — 88307 TISSUE EXAM BY PATHOLOGIST: CPT | Performed by: SURGERY

## 2020-11-04 ENCOUNTER — OFFICE VISIT (OUTPATIENT)
Dept: SURGERY | Facility: CLINIC | Age: 70
End: 2020-11-04

## 2020-11-04 VITALS
SYSTOLIC BLOOD PRESSURE: 144 MMHG | HEART RATE: 88 BPM | TEMPERATURE: 98 F | BODY MASS INDEX: 37 KG/M2 | WEIGHT: 257 LBS | DIASTOLIC BLOOD PRESSURE: 75 MMHG

## 2020-11-04 DIAGNOSIS — N61.1 LEFT BREAST ABSCESS: Primary | ICD-10-CM

## 2020-11-04 PROCEDURE — 99024 POSTOP FOLLOW-UP VISIT: CPT | Performed by: PHYSICIAN ASSISTANT

## 2020-11-04 PROCEDURE — 3077F SYST BP >= 140 MM HG: CPT | Performed by: PHYSICIAN ASSISTANT

## 2020-11-04 PROCEDURE — 3078F DIAST BP <80 MM HG: CPT | Performed by: PHYSICIAN ASSISTANT

## 2020-11-04 NOTE — PROGRESS NOTES
Post Operative Visit Note       Active Problems  1.  Left breast abscess         Chief Complaint   Patient presents with:  Post-Op: Left breast abscess 10/30/2020 c/o ooozing         History of Present Illness     The patient presents to the clinic today fo Smoker        Packs/day: 1.00        Types: Cigarettes      Smokeless tobacco: Never Used    Substance and Sexual Activity      Alcohol use: No        Alcohol/week: 0.0 standard drinks        Comment: social       Drug use: No    Other Topics      Concerns unexpected weight change. HENT: Negative for hearing loss, nosebleeds, sore throat and trouble swallowing. Respiratory: Negative for apnea, cough, shortness of breath and wheezing.     Cardiovascular: Negative for chest pain, palpitations and leg swell shower however he is able to shower. The patient will return the clinic in 2 weeks for reevaluation of his left breast.  All questions were answered. The patient agrees with this plan. No orders of the defined types were placed in this encounter.

## 2020-11-10 ENCOUNTER — OFFICE VISIT (OUTPATIENT)
Dept: SURGERY | Facility: CLINIC | Age: 70
End: 2020-11-10

## 2020-11-10 ENCOUNTER — OFFICE VISIT (OUTPATIENT)
Dept: FAMILY MEDICINE CLINIC | Facility: CLINIC | Age: 70
End: 2020-11-10
Payer: MEDICARE

## 2020-11-10 VITALS
HEART RATE: 89 BPM | RESPIRATION RATE: 18 BRPM | SYSTOLIC BLOOD PRESSURE: 108 MMHG | WEIGHT: 256 LBS | BODY MASS INDEX: 36.65 KG/M2 | DIASTOLIC BLOOD PRESSURE: 60 MMHG | OXYGEN SATURATION: 98 % | HEIGHT: 70 IN

## 2020-11-10 VITALS
HEIGHT: 70 IN | DIASTOLIC BLOOD PRESSURE: 64 MMHG | HEART RATE: 96 BPM | SYSTOLIC BLOOD PRESSURE: 97 MMHG | BODY MASS INDEX: 36.65 KG/M2 | TEMPERATURE: 97 F | WEIGHT: 256 LBS

## 2020-11-10 DIAGNOSIS — N60.12 CHRONIC MASTITIS OF LEFT BREAST: Primary | ICD-10-CM

## 2020-11-10 DIAGNOSIS — E78.00 PURE HYPERCHOLESTEROLEMIA: ICD-10-CM

## 2020-11-10 DIAGNOSIS — Z98.890 S/P LEFT BREAST BIOPSY: ICD-10-CM

## 2020-11-10 PROCEDURE — 3078F DIAST BP <80 MM HG: CPT | Performed by: SURGERY

## 2020-11-10 PROCEDURE — 3074F SYST BP LT 130 MM HG: CPT | Performed by: SURGERY

## 2020-11-10 PROCEDURE — 3078F DIAST BP <80 MM HG: CPT | Performed by: FAMILY MEDICINE

## 2020-11-10 PROCEDURE — 3008F BODY MASS INDEX DOCD: CPT | Performed by: SURGERY

## 2020-11-10 PROCEDURE — 99024 POSTOP FOLLOW-UP VISIT: CPT | Performed by: SURGERY

## 2020-11-10 PROCEDURE — 99213 OFFICE O/P EST LOW 20 MIN: CPT | Performed by: FAMILY MEDICINE

## 2020-11-10 PROCEDURE — 3008F BODY MASS INDEX DOCD: CPT | Performed by: FAMILY MEDICINE

## 2020-11-10 PROCEDURE — 3074F SYST BP LT 130 MM HG: CPT | Performed by: FAMILY MEDICINE

## 2020-11-10 RX ORDER — AMOXICILLIN AND CLAVULANATE POTASSIUM 875; 125 MG/1; MG/1
1 TABLET, FILM COATED ORAL 2 TIMES DAILY
Qty: 14 TABLET | Refills: 0 | Status: SHIPPED | OUTPATIENT
Start: 2020-11-10 | End: 2020-11-17

## 2020-11-10 RX ORDER — LOSARTAN POTASSIUM AND HYDROCHLOROTHIAZIDE 25; 100 MG/1; MG/1
1 TABLET ORAL DAILY
Qty: 90 TABLET | Refills: 3 | Status: SHIPPED | OUTPATIENT
Start: 2020-11-10 | End: 2021-12-09

## 2020-11-10 RX ORDER — SIMVASTATIN 40 MG
40 TABLET ORAL NIGHTLY
Qty: 90 TABLET | Refills: 3 | Status: SHIPPED | OUTPATIENT
Start: 2020-11-10 | End: 2021-12-09

## 2020-11-10 NOTE — H&P
New Patient Visit Note       Active Problems      No diagnosis found. Chief Complaint   Patient presents with:  Abscess: NP chest wall abscess. Pt states located on the left side. Pt states there is drainage and blood. Pt denies pain n/v fever chills.  P Potassium-HCTZ 100-25 MG Oral Tab, Take 1 tablet by mouth daily. , Disp: 90 tablet, Rfl: 3  •  simvastatin 40 MG Oral Tab, Take 1 tablet (40 mg total) by mouth nightly., Disp: 90 tablet, Rfl: 3  •  furosemide 20 MG Oral Tab, Take 1 tablet (20 mg total) by m pain, anal bleeding, blood in stool, constipation, diarrhea, nausea and vomiting. Genitourinary: Negative for difficulty urinating, dysuria, frequency and urgency. Musculoskeletal: Negative for arthralgias and myalgias.    Skin: Negative for color ricks

## 2020-11-10 NOTE — PROGRESS NOTES
Here for follow-up about 3 weeks ago developed a subareolar abscess left side he has had sebaceous cyst abscesses in various spots in the past today's exam the area of induration is not draining not terribly tender and extends laterally from the areolar

## 2020-11-10 NOTE — H&P
New Patient Visit Note       Active Problems      1. Chronic mastitis of left breast    2. S/P left breast biopsy        Chief Complaint   Patient presents with:  Abscess: NP chest wall abscess. Pt states located on the left side.  Pt states there is draina •  bacitracin 500 UNIT/GM External Ointment, Apply topically 2 (two) times daily. , Disp: , Rfl:   •  mupirocin 2 % External Ointment, Apply to affected area three times daily for 7 days, Disp: 1 g, Rfl: 0  •  Albuterol Sulfate HFA (PROAIR HFA) 108 (90 Base Neurological: Negative for dizziness, syncope and light-headedness. Hematological: Negative for adenopathy. Does not bruise/bleed easily. Psychiatric/Behavioral: Negative for confusion, hallucinations and sleep disturbance.        Physical Findings   BP Patient underwent surgical excisional biopsy on October 30, 2020 by Dr. Ron Martinez. The final pathology was consistent with inflammatory changes. There was no evidence of atypia or malignancy.   The patient was recently on a 14-day course of ciprofloxacin

## 2020-11-18 ENCOUNTER — OFFICE VISIT (OUTPATIENT)
Dept: SURGERY | Facility: CLINIC | Age: 70
End: 2020-11-18

## 2020-11-18 VITALS
HEART RATE: 97 BPM | BODY MASS INDEX: 37.06 KG/M2 | HEIGHT: 69.5 IN | DIASTOLIC BLOOD PRESSURE: 72 MMHG | SYSTOLIC BLOOD PRESSURE: 128 MMHG | WEIGHT: 256 LBS | TEMPERATURE: 97 F | RESPIRATION RATE: 16 BRPM

## 2020-11-18 DIAGNOSIS — N61.1 LEFT BREAST ABSCESS: Primary | ICD-10-CM

## 2020-11-18 PROCEDURE — 99024 POSTOP FOLLOW-UP VISIT: CPT | Performed by: SURGERY

## 2020-11-18 PROCEDURE — 3074F SYST BP LT 130 MM HG: CPT | Performed by: SURGERY

## 2020-11-18 PROCEDURE — 3078F DIAST BP <80 MM HG: CPT | Performed by: SURGERY

## 2020-11-18 PROCEDURE — 3008F BODY MASS INDEX DOCD: CPT | Performed by: SURGERY

## 2020-11-18 NOTE — PROGRESS NOTES
Follow Up Visit Note       Active Problems      1. Left breast abscess          Chief Complaint   Patient presents with:  Breast Follow-up: F/U L. breast abscess, I& D done    C/O discharge- minimal. No NV fever or chills.          History of Present Illnes Years of education: Not on file      Highest education level: Not on file    Tobacco Use      Smoking status: Current Every Day Smoker        Packs/day: 1.00        Types: Cigarettes      Smokeless tobacco: Never Used    Substance and Sexual Activity Systems  The Review of Systems has been reviewed by me during today. Review of Systems   Constitutional: Negative for chills, diaphoresis, fatigue, fever and unexpected weight change.    HENT: Negative for hearing loss, nosebleeds, sore throat and trouble report.     Final Diagnosis:   Left breast, lumpectomy:  -Breast tissue with abscess formation with acute and chronic inflammation.  -Overlying skin with chronic inflammation   Electronically signed by Marcelina Burks MD on 11/2/2020         Assessment

## 2020-12-02 ENCOUNTER — OFFICE VISIT (OUTPATIENT)
Dept: SURGERY | Facility: CLINIC | Age: 70
End: 2020-12-02

## 2020-12-02 VITALS
BODY MASS INDEX: 36.65 KG/M2 | TEMPERATURE: 97 F | HEIGHT: 70 IN | SYSTOLIC BLOOD PRESSURE: 108 MMHG | DIASTOLIC BLOOD PRESSURE: 70 MMHG | WEIGHT: 256 LBS | HEART RATE: 92 BPM

## 2020-12-02 DIAGNOSIS — N61.1 LEFT BREAST ABSCESS: Primary | ICD-10-CM

## 2020-12-02 PROCEDURE — 3078F DIAST BP <80 MM HG: CPT | Performed by: SURGERY

## 2020-12-02 PROCEDURE — 3074F SYST BP LT 130 MM HG: CPT | Performed by: SURGERY

## 2020-12-02 PROCEDURE — 99024 POSTOP FOLLOW-UP VISIT: CPT | Performed by: SURGERY

## 2020-12-02 PROCEDURE — 3008F BODY MASS INDEX DOCD: CPT | Performed by: SURGERY

## 2020-12-02 NOTE — PATIENT INSTRUCTIONS
Wash with Hibiclens (available at the drug store) once a week to decrease the chance of the cysts getting infected.

## 2020-12-02 NOTE — PROGRESS NOTES
Post Operative Visit Note       Active Problems  1.  Left breast abscess         Chief Complaint   Patient presents with:  Post-Op: 2 week f/u - I&D LEFT BREAST ABSCESS W/ Jurgen ON 10/30 - PT STATES ABOUT TWO DAYS AFTER SEEING Jurgen, HE NOTICED THE DRAINAGE STO Number of children: Not on file      Years of education: Not on file      Highest education level: Not on file    Tobacco Use      Smoking status: Current Every Day Smoker        Packs/day: 1.00        Types: Cigarettes      Smokeless tobacco: Never Used daily., Disp: , Rfl:       Review of Systems  The Review of Systems has been reviewed by me during today. Review of Systems   Constitutional: Negative for chills, diaphoresis, fatigue, fever and unexpected weight change.    HENT: Negative for hearing loss, abscess  (primary encounter diagnosis)      Plan   · The patient has healed well after excision of his left breast mastitis. · The patient reports he has multiple sebaceous cyst that will develop.   I recommended that he shower weekly with Hibiclens in ord

## 2020-12-04 ENCOUNTER — PATIENT MESSAGE (OUTPATIENT)
Dept: FAMILY MEDICINE CLINIC | Facility: CLINIC | Age: 70
End: 2020-12-04

## 2020-12-04 NOTE — TELEPHONE ENCOUNTER
From: Jae Soliman  To: Inna Torres DO  Sent: 12/4/2020 12:22 PM CST  Subject: Prescription Question    Would you please send a New Prescription for Proair Hfa Inh 8.5gm W/count90 mcg for a 3 month supply with some refills please to:  [facility]

## 2020-12-05 RX ORDER — ALBUTEROL SULFATE 90 UG/1
2 AEROSOL, METERED RESPIRATORY (INHALATION) EVERY 4 HOURS PRN
Qty: 3 INHALER | Refills: 3 | Status: SHIPPED | OUTPATIENT
Start: 2020-12-05

## 2021-01-19 ENCOUNTER — TELEPHONE (OUTPATIENT)
Dept: FAMILY MEDICINE CLINIC | Facility: CLINIC | Age: 71
End: 2021-01-19

## 2021-01-19 ENCOUNTER — OFFICE VISIT (OUTPATIENT)
Dept: FAMILY MEDICINE CLINIC | Facility: CLINIC | Age: 71
End: 2021-01-19
Payer: MEDICARE

## 2021-01-19 VITALS
WEIGHT: 256 LBS | SYSTOLIC BLOOD PRESSURE: 126 MMHG | TEMPERATURE: 98 F | HEIGHT: 70 IN | HEART RATE: 77 BPM | DIASTOLIC BLOOD PRESSURE: 78 MMHG | BODY MASS INDEX: 36.65 KG/M2

## 2021-01-19 DIAGNOSIS — N63.0 BREAST MASS: Primary | ICD-10-CM

## 2021-01-19 PROCEDURE — 99213 OFFICE O/P EST LOW 20 MIN: CPT | Performed by: FAMILY MEDICINE

## 2021-01-19 PROCEDURE — 3078F DIAST BP <80 MM HG: CPT | Performed by: FAMILY MEDICINE

## 2021-01-19 PROCEDURE — 3008F BODY MASS INDEX DOCD: CPT | Performed by: FAMILY MEDICINE

## 2021-01-19 PROCEDURE — 3074F SYST BP LT 130 MM HG: CPT | Performed by: FAMILY MEDICINE

## 2021-01-19 NOTE — TELEPHONE ENCOUNTER
Dr. Crystal Connor,  breast left complete ordered today. Do you want a diagnostic mammogram at this time?     Last diagnostic mammogram: 9/21/2020

## 2021-01-19 NOTE — PROGRESS NOTES
HPI:   Luis Lagos is a 78year old male who presents for with breast concerns     Pt s/p mammogram and biopsy in October   Normal biopsy   Pt s/p consult with Dr. Kathya Valdivia   Pt felt a new area apart from area biopsied  No fever  No discharge Past Surgical History:   Procedure Laterality Date   • ANESTH,SURGERY OF SHOULDER  1/1/04    lump left   • YEFRI LOCALIZATION WIRE 1 SITE RIGHT (CPT=19281)  2000   • OTHER SURGICAL HISTORY  9/1/70    testis   • OTHER SURGICAL HISTORY  10/30/2020    I&D 1. Breast mass    - US BREAST LEFT COMPLETE (CHR=59565); Future    Questions answered and patient indicates understanding of these issues and agrees to the plan. Follow up in 1-2 mo or sooner if needed .

## 2021-01-19 NOTE — TELEPHONE ENCOUNTER
PT WAS SEEN TODAY. MAMMOGRAM WAS ORDERED BUT IT NEEDS TO BE DIAGNOSTIC MAMMOGRAM WHICH WOULD INCLUDE THE ULTRA SOUND SINCE PT IS HAVING AN ISSUE. IF THIS CAN BE DONE ASAP. PT WANTS TO GET IN RIGHT AWAY.   PLEASE LET CENTRAL SCHEDULING KNOW SO THEY CAN CA

## 2021-01-21 ENCOUNTER — HOSPITAL ENCOUNTER (OUTPATIENT)
Dept: MAMMOGRAPHY | Facility: HOSPITAL | Age: 71
Discharge: HOME | End: 2021-01-21
Attending: FAMILY MEDICINE
Payer: MEDICARE

## 2021-01-21 DIAGNOSIS — N63.0 BREAST MASS: ICD-10-CM

## 2021-01-21 PROCEDURE — 76642 ULTRASOUND BREAST LIMITED: CPT | Performed by: FAMILY MEDICINE

## 2021-01-21 PROCEDURE — 77066 DX MAMMO INCL CAD BI: CPT | Performed by: FAMILY MEDICINE

## 2021-01-21 PROCEDURE — 77062 BREAST TOMOSYNTHESIS BI: CPT | Performed by: FAMILY MEDICINE

## 2021-02-13 DIAGNOSIS — Z23 NEED FOR VACCINATION: ICD-10-CM

## 2021-02-17 ENCOUNTER — TELEMEDICINE (OUTPATIENT)
Dept: FAMILY MEDICINE CLINIC | Facility: CLINIC | Age: 71
End: 2021-02-17
Payer: MEDICARE

## 2021-02-17 DIAGNOSIS — S23.8XXA SPRAIN OF CHEST WALL, INITIAL ENCOUNTER: Primary | ICD-10-CM

## 2021-02-17 PROCEDURE — 99213 OFFICE O/P EST LOW 20 MIN: CPT | Performed by: FAMILY MEDICINE

## 2021-02-17 NOTE — PROGRESS NOTES
HPI:    Patient ID: Filiberto Lang is a 78year old male. Pain  This is a new problem. Episode onset: 2/14/21 after shoveling snow. Shovel hit large pieve of ice. The problem occurs constantly.  Associated symptoms comments: + left arm pain, + left-s Keflex                  RASH    Comment:CAPS   PHYSICAL EXAM:   Physical Exam   Psychiatric: He has a normal mood and affect.  Thought content normal.     Two way communication was completed with audio and video due to COVID lock down        ASSESSMENT/PL

## 2021-03-02 NOTE — TELEPHONE ENCOUNTER
03/02/21 0242   [place] Suicide Severity Rating Scale (C-SSRS)   1. Have you wished you were dead or wished you could go to sleep and not wake up? (past month) Yes   2. Have you actually had any thoughts of killing yourself? (past month) Yes   3. Have you been thinking about how you might kill yourself? (past month) Yes   4. Have you had these thoughts and had some intention of acting on them? (past month) Yes   5. Have you started to work out or worked out the details of how to kill yourself? Do you intend to carry out this plan? (past month) No   6. Have you ever done anything, started to do anything, or prepared to do anything to end your life? (lifetime) No      Order placed. Left detailed message for Jhonny Witt.

## 2021-04-01 ENCOUNTER — TELEPHONE (OUTPATIENT)
Dept: FAMILY MEDICINE CLINIC | Facility: CLINIC | Age: 71
End: 2021-04-01

## 2021-05-03 ENCOUNTER — OFFICE VISIT (OUTPATIENT)
Dept: FAMILY MEDICINE CLINIC | Facility: CLINIC | Age: 71
End: 2021-05-03
Payer: MEDICARE

## 2021-05-03 VITALS
WEIGHT: 258 LBS | OXYGEN SATURATION: 98 % | TEMPERATURE: 98 F | RESPIRATION RATE: 18 BRPM | BODY MASS INDEX: 36.94 KG/M2 | HEIGHT: 70 IN | SYSTOLIC BLOOD PRESSURE: 132 MMHG | HEART RATE: 72 BPM | DIASTOLIC BLOOD PRESSURE: 80 MMHG

## 2021-05-03 DIAGNOSIS — Z99.89 OSA ON CPAP: ICD-10-CM

## 2021-05-03 DIAGNOSIS — E83.110 HEMOCHROMATOSIS ASSOCIATED WITH COMPOUND HETEROZYGOUS MUTATION IN HFE GENE (HCC): ICD-10-CM

## 2021-05-03 DIAGNOSIS — D69.6 THROMBOCYTOPENIA (HCC): ICD-10-CM

## 2021-05-03 DIAGNOSIS — E78.00 PURE HYPERCHOLESTEROLEMIA: ICD-10-CM

## 2021-05-03 DIAGNOSIS — I10 ESSENTIAL HYPERTENSION, BENIGN: ICD-10-CM

## 2021-05-03 DIAGNOSIS — G47.33 OSA ON CPAP: ICD-10-CM

## 2021-05-03 DIAGNOSIS — N18.30 STAGE 3 CHRONIC KIDNEY DISEASE, UNSPECIFIED WHETHER STAGE 3A OR 3B CKD (HCC): Chronic | ICD-10-CM

## 2021-05-03 DIAGNOSIS — Z12.5 SCREENING PSA (PROSTATE SPECIFIC ANTIGEN): ICD-10-CM

## 2021-05-03 DIAGNOSIS — Z00.00 ENCOUNTER FOR ANNUAL HEALTH EXAMINATION: Primary | ICD-10-CM

## 2021-05-03 DIAGNOSIS — E78.00 ELEVATED CHOLESTEROL: ICD-10-CM

## 2021-05-03 DIAGNOSIS — Z12.11 COLON CANCER SCREENING: ICD-10-CM

## 2021-05-03 DIAGNOSIS — Q98.4 KLINEFELTER SYNDROME: ICD-10-CM

## 2021-05-03 DIAGNOSIS — R53.83 OTHER FATIGUE: ICD-10-CM

## 2021-05-03 DIAGNOSIS — E55.9 VITAMIN D DEFICIENCY: ICD-10-CM

## 2021-05-03 DIAGNOSIS — E66.01 SEVERE OBESITY (BMI 35.0-39.9) WITH COMORBIDITY (HCC): ICD-10-CM

## 2021-05-03 PROCEDURE — 3075F SYST BP GE 130 - 139MM HG: CPT | Performed by: FAMILY MEDICINE

## 2021-05-03 PROCEDURE — 3008F BODY MASS INDEX DOCD: CPT | Performed by: FAMILY MEDICINE

## 2021-05-03 PROCEDURE — G0439 PPPS, SUBSEQ VISIT: HCPCS | Performed by: FAMILY MEDICINE

## 2021-05-03 PROCEDURE — 96160 PT-FOCUSED HLTH RISK ASSMT: CPT | Performed by: FAMILY MEDICINE

## 2021-05-03 PROCEDURE — 99397 PER PM REEVAL EST PAT 65+ YR: CPT | Performed by: FAMILY MEDICINE

## 2021-05-03 PROCEDURE — 3079F DIAST BP 80-89 MM HG: CPT | Performed by: FAMILY MEDICINE

## 2021-05-03 NOTE — PROGRESS NOTES
HPI:   jR Alegria is a 78year old male who presents for a MA (Medicare Advantage) [facility] (Once per calendar year).       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 9) with comorbidity ([address]- discussed diet and exercise      Thrombocytopenia ([address]- monitor labs      Baron Oliveros Glycol-Propyl Glycol (SYSTANE) 0.4-0.3 % Ophthalmic Gel, Apply to eye. Aspirin (ASPIR-81 OR), Take 81 mg by mouth daily. Coenzyme Q10 (COQ-10) 200 MG Oral Cap, Take by mouth daily. Folic Acid 0.8 MG Oral Cap, Take by mouth daily.          MEDICAL I °C) (Temporal)   Resp 18   Ht 5' 10\" (1.778 m)   Wt 258 lb (117 kg)   SpO2 98%   BMI 37.02 kg/m²   Estimated body mass index is 37.02 kg/m² as calculated from the following:    Height as of this encounter: 5' 10\" (1.778 m).     Weight as of this encounter Administered   • >=3 YRS FLUZONE OR FLUARIX QUAD PRESERVE FREE SINGLE DOSE (75697) FLU CLINIC 11/25/2016   • >=3 YRS TRI  MULTIDOSE VIAL (75029) FLU CLINIC 11/11/2014   • FLU VAC High Dose 65 YRS & Older PRSV Free (66127) 09/20/2019, 09/18/2020   • FLUAD H and exercise. Return in 1 year (on 5/3/2022).      Arabella Tamayo, DO, 5/3/2021     General Health     In the past six months, have you lost more than 10 pounds without trying?: 2 - No  Has your appetite been poor?: No  How does the patient maintain a good AA>50, > 65 No flowsheet data found.     Prostate Cancer Screening      PSA  Annually PSA due on 06/17/2022  Update Health Maintenance if applicable     Immunizations (Update Immunization Activity if applicable)     Influenza  Covered Annually 9/18/

## 2021-05-03 NOTE — PATIENT INSTRUCTIONS
Earl Araujo's SCREENING SCHEDULE   Tests on this list are recommended by your physician but may not be covered, or covered at this frequency, by your insurer. Please check with your insurance carrier before scheduling to verify coverage.     PREV for medical reasons    Electrocardiogram date10/26/2020 Routine EKG is not a screening covered service except at the [place] to Medicare Visit    Abdominal aortic aneurysm screening (once between ages 73-68)  No results found for this or any previous visit VAC NO PRSV 4 JEANNA 3 YRS+   Orders placed or performed in visit on 11/11/14   • INFLUENZA VIRUS VACCINE, >=1YEARS OF AGE    Please get every year    Pneumococcal 13 (Prevnar)  Covered Once after 65 Orders placed or performed in visit on 10/27/17   • PNEUMO [place])  www. [email or web address]. org  This link also has information from the [address] [place] [address] regarding Advance Directives.

## 2021-05-04 ENCOUNTER — TELEPHONE (OUTPATIENT)
Dept: FAMILY MEDICINE CLINIC | Facility: CLINIC | Age: 71
End: 2021-05-04

## 2021-05-04 NOTE — TELEPHONE ENCOUNTER
Pt scheduled for 2nd covid vaccine on the 6th. Should he move out his CBC that he was going to have done on the 12th?

## 2021-05-07 ENCOUNTER — PATIENT MESSAGE (OUTPATIENT)
Dept: FAMILY MEDICINE CLINIC | Facility: CLINIC | Age: 71
End: 2021-05-07

## 2021-05-07 NOTE — TELEPHONE ENCOUNTER
From: Kelsea Giraldo  To: Nick Barrios DO  Sent: 5/7/2021 8:25 AM CDT  Subject: Non-Urgent Medical Question    Just Letting you know I received the [employer] Covid-19 vaccine shot in my left arm on May 06, 2021 @ [address] @ roughly 12

## 2021-05-08 ENCOUNTER — LAB ENCOUNTER (OUTPATIENT)
Dept: LAB | Facility: HOSPITAL | Age: 71
End: 2021-05-08
Attending: FAMILY MEDICINE
Payer: MEDICARE

## 2021-05-08 DIAGNOSIS — Z12.11 COLON CANCER SCREENING: ICD-10-CM

## 2021-05-08 PROCEDURE — 82274 ASSAY TEST FOR BLOOD FECAL: CPT

## 2021-05-12 ENCOUNTER — LABORATORY ENCOUNTER (OUTPATIENT)
Dept: LAB | Age: 71
End: 2021-05-12
Attending: FAMILY MEDICINE
Payer: MEDICARE

## 2021-05-12 DIAGNOSIS — R73.9 HYPERGLYCEMIA: ICD-10-CM

## 2021-05-12 DIAGNOSIS — R53.83 OTHER FATIGUE: ICD-10-CM

## 2021-05-12 DIAGNOSIS — E83.110 HEMOCHROMATOSIS ASSOCIATED WITH COMPOUND HETEROZYGOUS MUTATION IN HFE GENE (HCC): ICD-10-CM

## 2021-05-12 DIAGNOSIS — I10 ESSENTIAL HYPERTENSION, BENIGN: ICD-10-CM

## 2021-05-12 DIAGNOSIS — Z12.5 SCREENING PSA (PROSTATE SPECIFIC ANTIGEN): ICD-10-CM

## 2021-05-12 DIAGNOSIS — E78.00 ELEVATED CHOLESTEROL: ICD-10-CM

## 2021-05-12 DIAGNOSIS — E55.9 VITAMIN D DEFICIENCY: ICD-10-CM

## 2021-05-12 PROCEDURE — 36415 COLL VENOUS BLD VENIPUNCTURE: CPT

## 2021-05-12 PROCEDURE — 83550 IRON BINDING TEST: CPT

## 2021-05-12 PROCEDURE — 82607 VITAMIN B-12: CPT

## 2021-05-12 PROCEDURE — 80061 LIPID PANEL: CPT

## 2021-05-12 PROCEDURE — 82728 ASSAY OF FERRITIN: CPT

## 2021-05-12 PROCEDURE — 85025 COMPLETE CBC W/AUTO DIFF WBC: CPT

## 2021-05-12 PROCEDURE — 84439 ASSAY OF FREE THYROXINE: CPT

## 2021-05-12 PROCEDURE — 84443 ASSAY THYROID STIM HORMONE: CPT

## 2021-05-12 PROCEDURE — 82306 VITAMIN D 25 HYDROXY: CPT

## 2021-05-12 PROCEDURE — 83540 ASSAY OF IRON: CPT

## 2021-05-12 PROCEDURE — 80053 COMPREHEN METABOLIC PANEL: CPT

## 2021-05-12 PROCEDURE — 83036 HEMOGLOBIN GLYCOSYLATED A1C: CPT

## 2021-06-08 ENCOUNTER — OFFICE VISIT (OUTPATIENT)
Dept: HEMATOLOGY/ONCOLOGY | Facility: HOSPITAL | Age: 71
End: 2021-06-08
Attending: SURGERY
Payer: MEDICARE

## 2021-06-08 ENCOUNTER — OFFICE VISIT (OUTPATIENT)
Dept: SURGERY | Facility: CLINIC | Age: 71
End: 2021-06-08
Payer: MEDICARE

## 2021-06-08 VITALS
SYSTOLIC BLOOD PRESSURE: 129 MMHG | TEMPERATURE: 98 F | OXYGEN SATURATION: 93 % | DIASTOLIC BLOOD PRESSURE: 74 MMHG | HEART RATE: 93 BPM | HEIGHT: 70.39 IN | WEIGHT: 256 LBS | BODY MASS INDEX: 36.24 KG/M2 | RESPIRATION RATE: 18 BRPM

## 2021-06-08 DIAGNOSIS — N60.12 CHRONIC MASTITIS OF LEFT BREAST: Primary | ICD-10-CM

## 2021-06-08 PROCEDURE — 99213 OFFICE O/P EST LOW 20 MIN: CPT | Performed by: SURGERY

## 2021-06-08 PROCEDURE — 36415 COLL VENOUS BLD VENIPUNCTURE: CPT

## 2021-06-08 RX ORDER — CEFADROXIL 500 MG/1
500 CAPSULE ORAL 2 TIMES DAILY
Qty: 20 CAPSULE | Refills: 0 | Status: SHIPPED | OUTPATIENT
Start: 2021-06-08 | End: 2021-07-13

## 2021-06-08 NOTE — PROGRESS NOTES
HPI/Subjective:   Patient ID: Carlito Cool is a 78year old male known to me for prior excision of a left breast abscess, performed October 30, 2020.   The patient states he underwent imaging in January, which was essentially normal except for a hem Q10 (COQ-10) 200 MG Oral Cap Take by mouth daily. • Folic Acid 0.8 MG Oral Cap Take by mouth daily.          Allergies:  Cephalosporins            Keflex                  RASH    Comment:CAPS    Objective:   Physical Exam  Constitutional:       Appear

## 2021-06-08 NOTE — PROGRESS NOTES
Patient presents to clinic for follow up visit LFT breast mastitis. Denies any pain currently. Medication and allergies reviewed and updated.

## 2021-06-22 ENCOUNTER — OFFICE VISIT (OUTPATIENT)
Dept: HEMATOLOGY/ONCOLOGY | Facility: HOSPITAL | Age: 71
End: 2021-06-22
Attending: SURGERY
Payer: MEDICARE

## 2021-06-22 ENCOUNTER — TELEPHONE (OUTPATIENT)
Dept: SURGERY | Facility: CLINIC | Age: 71
End: 2021-06-22

## 2021-06-22 ENCOUNTER — OFFICE VISIT (OUTPATIENT)
Dept: SURGERY | Facility: CLINIC | Age: 71
End: 2021-06-22
Payer: MEDICARE

## 2021-06-22 VITALS
RESPIRATION RATE: 18 BRPM | BODY MASS INDEX: 36 KG/M2 | HEART RATE: 92 BPM | DIASTOLIC BLOOD PRESSURE: 75 MMHG | TEMPERATURE: 98 F | WEIGHT: 257 LBS | SYSTOLIC BLOOD PRESSURE: 127 MMHG | OXYGEN SATURATION: 96 %

## 2021-06-22 DIAGNOSIS — N60.12 MASTITIS CHRONIC, LEFT: Primary | ICD-10-CM

## 2021-06-22 PROCEDURE — 99213 OFFICE O/P EST LOW 20 MIN: CPT | Performed by: SURGERY

## 2021-06-22 NOTE — TELEPHONE ENCOUNTER
Pt saw Dr. Riki Suarez at breast clinic today. pt is planning to undergo excision of a left breast abscess. Pt would like to know recovery time post surgery. Driving restrictions. Pt can be reached at home   Ph. [phone].

## 2021-06-23 ENCOUNTER — TELEPHONE (OUTPATIENT)
Dept: SURGERY | Facility: CLINIC | Age: 71
End: 2021-06-23

## 2021-06-23 DIAGNOSIS — N60.12 CHRONIC MASTITIS OF LEFT BREAST: Primary | ICD-10-CM

## 2021-06-24 ENCOUNTER — LAB ENCOUNTER (OUTPATIENT)
Dept: LAB | Age: 71
End: 2021-06-24
Attending: SURGERY
Payer: MEDICARE

## 2021-06-24 ENCOUNTER — EKG ENCOUNTER (OUTPATIENT)
Dept: LAB | Age: 71
End: 2021-06-24
Attending: SURGERY
Payer: MEDICARE

## 2021-06-24 DIAGNOSIS — N60.12 CHRONIC MASTITIS OF LEFT BREAST: ICD-10-CM

## 2021-06-24 PROCEDURE — 80053 COMPREHEN METABOLIC PANEL: CPT

## 2021-06-24 PROCEDURE — 36415 COLL VENOUS BLD VENIPUNCTURE: CPT

## 2021-06-24 PROCEDURE — 93005 ELECTROCARDIOGRAM TRACING: CPT

## 2021-06-24 PROCEDURE — 93010 ELECTROCARDIOGRAM REPORT: CPT | Performed by: INTERNAL MEDICINE

## 2021-06-25 NOTE — PROGRESS NOTES
HPI/Subjective:   Patient ID: Jorden Aldridge is a 78year old male with past history of left breast mastitis, excised October 30, 2020.   The patient presented to the office on June 8 with recurrent pain and swelling in his left breast.  Physical exam Physical Exam  Constitutional:       Appearance: He is well-developed. HENT:      Head: Normocephalic and atraumatic. Eyes:      General: No scleral icterus. Conjunctiva/sclera: Conjunctivae normal.   Neck:      Vascular: No JVD.    Chest:      [facility]

## 2021-07-02 ENCOUNTER — LAB REQUISITION (OUTPATIENT)
Dept: LAB | Facility: HOSPITAL | Age: 71
End: 2021-07-02
Payer: MEDICARE

## 2021-07-02 DIAGNOSIS — N61.0 MASTITIS WITHOUT ABSCESS: ICD-10-CM

## 2021-07-02 PROCEDURE — 88307 TISSUE EXAM BY PATHOLOGIST: CPT | Performed by: SURGERY

## 2021-07-13 ENCOUNTER — OFFICE VISIT (OUTPATIENT)
Dept: HEMATOLOGY/ONCOLOGY | Facility: HOSPITAL | Age: 71
End: 2021-07-13
Attending: SURGERY
Payer: MEDICARE

## 2021-07-13 ENCOUNTER — OFFICE VISIT (OUTPATIENT)
Dept: SURGERY | Facility: CLINIC | Age: 71
End: 2021-07-13

## 2021-07-13 VITALS
HEIGHT: 70.39 IN | RESPIRATION RATE: 18 BRPM | BODY MASS INDEX: 36.1 KG/M2 | SYSTOLIC BLOOD PRESSURE: 127 MMHG | TEMPERATURE: 97 F | WEIGHT: 255 LBS | DIASTOLIC BLOOD PRESSURE: 75 MMHG | HEART RATE: 75 BPM | OXYGEN SATURATION: 97 %

## 2021-07-13 DIAGNOSIS — N61.0 ACUTE MASTITIS OF LEFT BREAST: Primary | ICD-10-CM

## 2021-07-13 PROCEDURE — 99211 OFF/OP EST MAY X REQ PHY/QHP: CPT

## 2021-07-13 PROCEDURE — 99024 POSTOP FOLLOW-UP VISIT: CPT | Performed by: SURGERY

## 2021-07-13 NOTE — PROGRESS NOTES
Patient presents to clinic for follow up visit LFT breast mastitis. Medication and allergies reviewed.

## 2021-07-15 NOTE — PROGRESS NOTES
HPI/Subjective:   Patient ID: Kacy Adame is a 78year old male underwent excision of recurrent left breast mastitis on July 2, 2021. He had a prior episode of left breast mastitis, excised October 30, 2020.   The patient presented to the office o Exam  Constitutional:       Appearance: He is well-developed. HENT:      Head: Normocephalic and atraumatic. Eyes:      General: No scleral icterus. Conjunctiva/sclera: Conjunctivae normal.   Neck:      Vascular: No JVD.    Chest:      Breasts:

## 2021-07-30 ENCOUNTER — TELEPHONE (OUTPATIENT)
Dept: HEMATOLOGY/ONCOLOGY | Facility: HOSPITAL | Age: 71
End: 2021-07-30

## 2021-07-30 NOTE — TELEPHONE ENCOUNTER
Patient was in the office recently with his wife to see Dr Becky Watson. During his wife's office visit, he asked Dr Becky Watson to sign his phlebotomy order for [employer]. Patient took the signed order to [facility] but it wasn't fully completed, therefore he was denied.

## 2021-07-30 NOTE — TELEPHONE ENCOUNTER
Patient is calling to see if he can get an order to get his blood drawn in order to go and get a pint of blood and he need a call back to discuss.

## 2021-08-05 ENCOUNTER — TELEPHONE (OUTPATIENT)
Dept: FAMILY MEDICINE CLINIC | Facility: CLINIC | Age: 71
End: 2021-08-05

## 2021-08-05 NOTE — TELEPHONE ENCOUNTER
Per Sulema Kathleen, Dr. Marlyn Birmingham completed physican order form for pt to donate blood, but did not complete the part that states pt's minimal Hgb levels for blood donation. Per note from Dr Trell Rizvi office on 7/30/21, they are aware that Dr Marlyn Birmingham did complete form for pt, but no copy was made, so nothing was scanned it. Dr. Trell Rizvi office instructed pt to have LE complete form now. Sulema Kathleen is sending me copy of form. Will address once fax is received. (Previous order from Dr Marlyn Birmingham dated 4/23/20 is scanned into media. On that form, Dr Marlyn Birmingham had 13g/dl listed as minimum hgb).

## 2021-08-05 NOTE — TELEPHONE ENCOUNTER
Stephany Gray from [facility] is requesting to speak to Dr. Alex Dean nurse regarding pt donating and his minimum hemoglobin level    Please advise

## 2021-08-10 ENCOUNTER — APPOINTMENT (OUTPATIENT)
Dept: HEMATOLOGY/ONCOLOGY | Facility: HOSPITAL | Age: 71
End: 2021-08-10
Attending: SURGERY
Payer: MEDICARE

## 2021-08-13 ENCOUNTER — OFFICE VISIT (OUTPATIENT)
Dept: HEMATOLOGY/ONCOLOGY | Facility: HOSPITAL | Age: 71
End: 2021-08-13
Attending: INTERNAL MEDICINE
Payer: MEDICARE

## 2021-08-13 VITALS
DIASTOLIC BLOOD PRESSURE: 70 MMHG | TEMPERATURE: 98 F | BODY MASS INDEX: 36.1 KG/M2 | WEIGHT: 255 LBS | HEART RATE: 76 BPM | HEIGHT: 70.39 IN | OXYGEN SATURATION: 97 % | RESPIRATION RATE: 18 BRPM | SYSTOLIC BLOOD PRESSURE: 107 MMHG

## 2021-08-13 DIAGNOSIS — E83.110 HEMOCHROMATOSIS ASSOCIATED WITH COMPOUND HETEROZYGOUS MUTATION IN HFE GENE (HCC): Primary | ICD-10-CM

## 2021-08-13 PROCEDURE — 99213 OFFICE O/P EST LOW 20 MIN: CPT | Performed by: INTERNAL MEDICINE

## 2021-08-13 NOTE — PROGRESS NOTES
Patient is here for MD f/u for Hemochromatosis. Last phlebotomy was on March 26th. Patient will need a new order to do these at [facility]. He is feeling well. Would like to have labs rechecked for HFE.        Education Record    Learner:  Patient    Disease /

## 2021-08-15 NOTE — PROGRESS NOTES
[facility]    PATIENT'S NAME: Walter Vaz   ATTENDING PHYSICIAN: Amisha Joseph M.D.    PATIENT ACCOUNT #: [de-identified] LOCATION: [address] [place] [address] RECORD #: [id number] YOB: 1950   DATE OF SERVICE: 08/13/2021       CANCER VITAL SIGNS:  His performance status is 0. Her weight is 255 pounds. His height is 5 feet 10 inches. Blood pressure is 107/70, pulse 76, respiratory rate 20, temperature 98. 2. HEENT:  Unremarkable. LYMPHATICS:  He has no adenopathy.    LUNGS:  Clear

## 2021-08-17 ENCOUNTER — LABORATORY ENCOUNTER (OUTPATIENT)
Dept: LAB | Age: 71
End: 2021-08-17
Attending: FAMILY MEDICINE
Payer: MEDICARE

## 2021-08-17 ENCOUNTER — OFFICE VISIT (OUTPATIENT)
Dept: SURGERY | Facility: CLINIC | Age: 71
End: 2021-08-17

## 2021-08-17 ENCOUNTER — OFFICE VISIT (OUTPATIENT)
Dept: HEMATOLOGY/ONCOLOGY | Facility: HOSPITAL | Age: 71
End: 2021-08-17
Attending: SURGERY
Payer: MEDICARE

## 2021-08-17 VITALS
TEMPERATURE: 97 F | HEART RATE: 96 BPM | BODY MASS INDEX: 36.05 KG/M2 | HEIGHT: 70.39 IN | SYSTOLIC BLOOD PRESSURE: 120 MMHG | OXYGEN SATURATION: 95 % | WEIGHT: 254.63 LBS | RESPIRATION RATE: 16 BRPM | DIASTOLIC BLOOD PRESSURE: 71 MMHG

## 2021-08-17 DIAGNOSIS — N61.0 ACUTE MASTITIS OF LEFT BREAST: Primary | ICD-10-CM

## 2021-08-17 DIAGNOSIS — L72.3 SEBACEOUS CYST: ICD-10-CM

## 2021-08-17 DIAGNOSIS — E83.110 HEMOCHROMATOSIS ASSOCIATED WITH COMPOUND HETEROZYGOUS MUTATION IN HFE GENE (HCC): ICD-10-CM

## 2021-08-17 PROCEDURE — 81256 HFE GENE: CPT

## 2021-08-17 PROCEDURE — 36415 COLL VENOUS BLD VENIPUNCTURE: CPT

## 2021-08-17 PROCEDURE — 99211 OFF/OP EST MAY X REQ PHY/QHP: CPT

## 2021-08-17 PROCEDURE — 99024 POSTOP FOLLOW-UP VISIT: CPT | Performed by: SURGERY

## 2021-08-17 NOTE — PROGRESS NOTES
Patient here for follow up visit excision of recurrent LFT breast mastitis (7/2/21). Denies any pain or discomfort. Medication and allergies reviewed.

## 2021-08-17 NOTE — PROGRESS NOTES
HPI/Subjective:   Patient ID: Crystal Ibanez is a 78year old male who underwent excision of recurrent left breast mastitis on July 2, 2021.   He had a prior episode of left breast mastitis, excised October 30, 2020. [facility] presents for 1 month wound karey is well-developed. HENT:      Head: Normocephalic and atraumatic. Eyes:      General: No scleral icterus. Conjunctiva/sclera: Conjunctivae normal.   Neck:      Vascular: No JVD.    Chest:      Breasts:         Left: No inverted nipple, mass, nipple

## 2021-08-23 ENCOUNTER — PATIENT OUTREACH (OUTPATIENT)
Dept: SURGERY | Facility: CLINIC | Age: 71
End: 2021-08-23

## 2021-08-24 LAB
C282Y HEMOCHROMATOSIS MUTATION: NEGATIVE
S65C HEMOCHROMATOSIS MUTATION: NEGATIVE

## 2021-08-25 ENCOUNTER — TELEPHONE (OUTPATIENT)
Dept: HEMATOLOGY/ONCOLOGY | Facility: HOSPITAL | Age: 71
End: 2021-08-25

## 2021-08-25 NOTE — TELEPHONE ENCOUNTER
Patient has an appointment at [facility] for 8/27, before he can receive treatment Deborah Dunn needs to know a minimum hemogoblin level

## 2021-08-25 NOTE — TELEPHONE ENCOUNTER
Test(s) completed: Hereditary Hemo chroma mututation  Results: Positive for H63D gene mutation, 2 bad copies, +hemochromatosis   Plan: per Dr Jerod Barrow continue phlebotomies as planned. Minimum hemoglobin level 13 g/dL.

## 2021-08-27 ENCOUNTER — TELEPHONE (OUTPATIENT)
Dept: HEMATOLOGY/ONCOLOGY | Facility: HOSPITAL | Age: 71
End: 2021-08-27

## 2021-08-27 NOTE — TELEPHONE ENCOUNTER
Date on patient's phlebotomy order was not legible. Confirmed with Dolores Simons at [facility] blood center, order was written when patient was in the office on 8/13/2021. This is a new order. Proceed with phlebotomy as planned.

## 2021-08-27 NOTE — TELEPHONE ENCOUNTER
Regina Segal from [facility] called, she needs to get clarification on a date for blood work, she is faxing over the paperwork to Antony Echevarria, patient is waiting at the facility

## 2021-09-01 ENCOUNTER — LAB ENCOUNTER (OUTPATIENT)
Dept: LAB | Age: 71
End: 2021-09-01
Attending: SURGERY
Payer: MEDICARE

## 2021-09-01 ENCOUNTER — HOSPITAL ENCOUNTER (OUTPATIENT)
Dept: GENERAL RADIOLOGY | Facility: HOSPITAL | Age: 71
Discharge: HOME | End: 2021-09-01
Attending: FAMILY MEDICINE
Payer: MEDICARE

## 2021-09-01 ENCOUNTER — OFFICE VISIT (OUTPATIENT)
Dept: SURGERY | Facility: CLINIC | Age: 71
End: 2021-09-01
Payer: MEDICARE

## 2021-09-01 VITALS
WEIGHT: 254 LBS | HEART RATE: 76 BPM | DIASTOLIC BLOOD PRESSURE: 67 MMHG | HEIGHT: 70 IN | SYSTOLIC BLOOD PRESSURE: 117 MMHG | BODY MASS INDEX: 36.36 KG/M2

## 2021-09-01 DIAGNOSIS — Z01.818 PRE-OP TESTING: ICD-10-CM

## 2021-09-01 DIAGNOSIS — S23.8XXA SPRAIN OF CHEST WALL, INITIAL ENCOUNTER: ICD-10-CM

## 2021-09-01 DIAGNOSIS — L72.3 INFLAMED SEBACEOUS CYST: Primary | ICD-10-CM

## 2021-09-01 LAB
ALBUMIN SERPL-MCNC: 3.9 G/DL (ref 3.4–5)
ALBUMIN/GLOB SERPL: 0.9 {RATIO} (ref 1–2)
ALP LIVER SERPL-CCNC: 48 U/L
ALT SERPL-CCNC: 40 U/L
ANION GAP SERPL CALC-SCNC: 8 MMOL/L (ref 0–18)
AST SERPL-CCNC: 26 U/L (ref 15–37)
BILIRUB SERPL-MCNC: 0.4 MG/DL (ref 0.1–2)
BUN BLD-MCNC: 21 MG/DL (ref 7–18)
CALCIUM BLD-MCNC: 9.5 MG/DL (ref 8.5–10.1)
CHLORIDE SERPL-SCNC: 105 MMOL/L (ref 98–112)
CO2 SERPL-SCNC: 26 MMOL/L (ref 21–32)
CREAT BLD-MCNC: 1.34 MG/DL
GLOBULIN PLAS-MCNC: 4.3 G/DL (ref 2.8–4.4)
GLUCOSE BLD-MCNC: 101 MG/DL (ref 70–99)
M PROTEIN MFR SERPL ELPH: 8.2 G/DL (ref 6.4–8.2)
OSMOLALITY SERPL CALC.SUM OF ELEC: 291 MOSM/KG (ref 275–295)
PATIENT FASTING Y/N/NP: NO
POTASSIUM SERPL-SCNC: 4.5 MMOL/L (ref 3.5–5.1)
SODIUM SERPL-SCNC: 139 MMOL/L (ref 136–145)

## 2021-09-01 PROCEDURE — 3008F BODY MASS INDEX DOCD: CPT | Performed by: SURGERY

## 2021-09-01 PROCEDURE — 80053 COMPREHEN METABOLIC PANEL: CPT

## 2021-09-01 PROCEDURE — 3078F DIAST BP <80 MM HG: CPT | Performed by: SURGERY

## 2021-09-01 PROCEDURE — 3074F SYST BP LT 130 MM HG: CPT | Performed by: SURGERY

## 2021-09-01 PROCEDURE — 99213 OFFICE O/P EST LOW 20 MIN: CPT | Performed by: SURGERY

## 2021-09-01 PROCEDURE — 36415 COLL VENOUS BLD VENIPUNCTURE: CPT

## 2021-09-01 NOTE — IMAGING NOTE
After getting labs drawn, registration saw Rib Xray in patient's chart and asked if he wanted to do them today. He said sure. After getting history from patient, checked the chart and realized Rib xrays were ordered 2/17/21 after shoveling snow.  Patient di

## 2021-09-01 NOTE — PROGRESS NOTES
Follow Up Visit Note       Active Problems      1. Inflamed sebaceous cyst    2. Pre-op testing          Chief Complaint   Patient presents with:  Cyst: 2 week follow up--s/p drainage of groin cyst.  c/o pain and drainage.          History of Present Illnes History      Marital status:       Spouse name: Not on file      Number of children: Not on file      Years of education: Not on file      Highest education level: Not on file    Tobacco Use      Smoking status: Current Every Day Smoker        Packs , Rfl:      Review of Systems  The Review of Systems has been reviewed by me during today. Review of Systems   Constitutional: Negative for chills, diaphoresis, fatigue, fever and unexpected weight change.    HENT: Negative for hearing loss, nosebleeds, so to proceed. · Because of his discomfort, his surgery has been scheduled later this week.      Orders Placed This Encounter      Comp Metabolic Panel (14)    Shruthi Bearden MD

## 2021-09-03 ENCOUNTER — LAB REQUISITION (OUTPATIENT)
Dept: LAB | Facility: HOSPITAL | Age: 71
End: 2021-09-03
Payer: MEDICARE

## 2021-09-03 DIAGNOSIS — L08.9 LOCAL INFECTION OF THE SKIN AND SUBCUTANEOUS TISSUE, UNSPECIFIED: ICD-10-CM

## 2021-09-03 DIAGNOSIS — L72.3 SEBACEOUS CYST: ICD-10-CM

## 2021-09-03 PROCEDURE — 88304 TISSUE EXAM BY PATHOLOGIST: CPT | Performed by: SURGERY

## 2021-09-07 ENCOUNTER — MED REC SCAN ONLY (OUTPATIENT)
Dept: SURGERY | Facility: CLINIC | Age: 71
End: 2021-09-07

## 2021-09-14 ENCOUNTER — OFFICE VISIT (OUTPATIENT)
Dept: SURGERY | Facility: CLINIC | Age: 71
End: 2021-09-14

## 2021-09-14 VITALS
WEIGHT: 254 LBS | TEMPERATURE: 98 F | HEART RATE: 78 BPM | HEIGHT: 70 IN | BODY MASS INDEX: 36.36 KG/M2 | SYSTOLIC BLOOD PRESSURE: 120 MMHG | DIASTOLIC BLOOD PRESSURE: 68 MMHG

## 2021-09-14 DIAGNOSIS — Z87.2 HISTORY OF SEBACEOUS CYST: Primary | ICD-10-CM

## 2021-09-14 PROCEDURE — 3074F SYST BP LT 130 MM HG: CPT | Performed by: PHYSICIAN ASSISTANT

## 2021-09-14 PROCEDURE — 3008F BODY MASS INDEX DOCD: CPT | Performed by: PHYSICIAN ASSISTANT

## 2021-09-14 PROCEDURE — 3078F DIAST BP <80 MM HG: CPT | Performed by: PHYSICIAN ASSISTANT

## 2021-09-14 PROCEDURE — 99024 POSTOP FOLLOW-UP VISIT: CPT | Performed by: PHYSICIAN ASSISTANT

## 2021-09-14 NOTE — PROGRESS NOTES
Post Operative Visit Note       Active Problems  1. History of sebaceous cyst         Chief Complaint   Patient presents with:  Post-Op: p/o RT groin cyst on 9/3 w/ ESH - Pt. states he feels well. Pt. c/o occasional itching.  PT. denies drainage, foul smell Father    • Other (emphysema) Father    • Diabetes Mother      Social History    Socioeconomic History      Marital status:     Tobacco Use      Smoking status: Current Every Day Smoker        Packs/day: 1.00        Types: Cigarettes      Smokeless been reviewed by me during today. Review of Systems   Constitutional: Negative for chills, diaphoresis, fatigue, fever and unexpected weight change. HENT: Negative for hearing loss, nosebleeds, sore throat and trouble swallowing.     Respiratory: Negativ normal.             Assessment   History of sebaceous cyst  (primary encounter diagnosis)      Plan   1. The patient is doing well following excision of sebaceous cyst from the right groin. 2. Surgical pathology was discussed with the patient.   3. He is t

## 2021-09-24 ENCOUNTER — OFFICE VISIT (OUTPATIENT)
Dept: FAMILY MEDICINE CLINIC | Facility: CLINIC | Age: 71
End: 2021-09-24
Payer: MEDICARE

## 2021-09-24 VITALS
OXYGEN SATURATION: 98 % | BODY MASS INDEX: 36.79 KG/M2 | HEIGHT: 70 IN | RESPIRATION RATE: 16 BRPM | DIASTOLIC BLOOD PRESSURE: 54 MMHG | WEIGHT: 257 LBS | SYSTOLIC BLOOD PRESSURE: 136 MMHG | HEART RATE: 91 BPM

## 2021-09-24 DIAGNOSIS — Z23 NEED FOR VACCINATION: ICD-10-CM

## 2021-09-24 DIAGNOSIS — D22.9 ATYPICAL NEVI: ICD-10-CM

## 2021-09-24 DIAGNOSIS — M67.449 GANGLION CYST OF FINGER: ICD-10-CM

## 2021-09-24 DIAGNOSIS — R21 RASH: ICD-10-CM

## 2021-09-24 DIAGNOSIS — G47.33 OSA (OBSTRUCTIVE SLEEP APNEA): Primary | ICD-10-CM

## 2021-09-24 PROCEDURE — G0008 ADMIN INFLUENZA VIRUS VAC: HCPCS | Performed by: FAMILY MEDICINE

## 2021-09-24 PROCEDURE — 3075F SYST BP GE 130 - 139MM HG: CPT | Performed by: FAMILY MEDICINE

## 2021-09-24 PROCEDURE — 99214 OFFICE O/P EST MOD 30 MIN: CPT | Performed by: FAMILY MEDICINE

## 2021-09-24 PROCEDURE — 3078F DIAST BP <80 MM HG: CPT | Performed by: FAMILY MEDICINE

## 2021-09-24 PROCEDURE — 3008F BODY MASS INDEX DOCD: CPT | Performed by: FAMILY MEDICINE

## 2021-09-24 PROCEDURE — 90662 IIV NO PRSV INCREASED AG IM: CPT | Performed by: FAMILY MEDICINE

## 2021-09-24 NOTE — PROGRESS NOTES
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

## 2021-09-30 ENCOUNTER — TELEPHONE (OUTPATIENT)
Dept: ORTHOPEDICS CLINIC | Facility: CLINIC | Age: 71
End: 2021-09-30

## 2021-09-30 NOTE — TELEPHONE ENCOUNTER
Patient is scheduled on 10/14 with Dr. Sonny Venegas for a ganglion cyst on the left hand. Please advise if any imaging is needed.

## 2021-10-20 ENCOUNTER — HOSPITAL ENCOUNTER (OUTPATIENT)
Age: 71
Discharge: HOME | End: 2021-10-20
Attending: EMERGENCY MEDICINE
Payer: MEDICARE

## 2021-10-20 VITALS
TEMPERATURE: 98 F | BODY MASS INDEX: 37.94 KG/M2 | SYSTOLIC BLOOD PRESSURE: 145 MMHG | OXYGEN SATURATION: 97 % | WEIGHT: 265 LBS | HEIGHT: 70 IN | HEART RATE: 98 BPM | DIASTOLIC BLOOD PRESSURE: 60 MMHG | RESPIRATION RATE: 20 BRPM

## 2021-10-20 DIAGNOSIS — N61.0 CELLULITIS OF BREAST: Primary | ICD-10-CM

## 2021-10-20 PROCEDURE — 99213 OFFICE O/P EST LOW 20 MIN: CPT

## 2021-10-20 RX ORDER — CLINDAMYCIN HYDROCHLORIDE 300 MG/1
300 CAPSULE ORAL 3 TIMES DAILY
Qty: 30 CAPSULE | Refills: 0 | Status: SHIPPED | OUTPATIENT
Start: 2021-10-20 | End: 2021-11-09

## 2021-10-21 NOTE — ED PROVIDER NOTES
Patient Seen in: [facility] [place]      History   Patient presents with:  Abscess    Stated Complaint: infection left breast area    Subjective:   HPI    Patient has had 2 previous surgeries on his breast, one around Halloween last year and anot cellulitis on the left breast.  It is warm, tender swollen and indurated but there is no area of fluctuance amenable to incision and drainage at this time.    I will treat with antibiotic clindamycin as patient reports no response to the possibly Bactrim in

## 2021-10-21 NOTE — ED INITIAL ASSESSMENT (HPI)
Pt presents with reddness and swelling to L nipple area, pt had a sebacious cyst to that area 1 year ago, which was drained and surgically removed, July pt had sugery  Once again

## 2021-10-26 ENCOUNTER — OFFICE VISIT (OUTPATIENT)
Dept: SURGERY | Facility: CLINIC | Age: 71
End: 2021-10-26
Payer: MEDICARE

## 2021-10-26 ENCOUNTER — OFFICE VISIT (OUTPATIENT)
Dept: HEMATOLOGY/ONCOLOGY | Facility: HOSPITAL | Age: 71
End: 2021-10-26
Attending: SURGERY
Payer: MEDICARE

## 2021-10-26 VITALS
DIASTOLIC BLOOD PRESSURE: 75 MMHG | OXYGEN SATURATION: 96 % | WEIGHT: 258.5 LBS | TEMPERATURE: 98 F | HEART RATE: 93 BPM | HEIGHT: 70.39 IN | BODY MASS INDEX: 36.59 KG/M2 | SYSTOLIC BLOOD PRESSURE: 119 MMHG | RESPIRATION RATE: 16 BRPM

## 2021-10-26 DIAGNOSIS — F17.200 SMOKER: ICD-10-CM

## 2021-10-26 DIAGNOSIS — N61.0 ACUTE MASTITIS OF RIGHT BREAST: Primary | ICD-10-CM

## 2021-10-26 PROCEDURE — 99211 OFF/OP EST MAY X REQ PHY/QHP: CPT

## 2021-10-26 PROCEDURE — 99214 OFFICE O/P EST MOD 30 MIN: CPT | Performed by: SURGERY

## 2021-10-26 NOTE — PATIENT INSTRUCTIONS
Potential dates for surgery:    [facility]  Monday, November 8 at [facility]  Thursday, November 11 at [facility]  Monday, November 15 at [facility]  Thursday, November 18 at [facility]  Monday, November 22 at [facility]      Please call Trinidad Figueroa

## 2021-10-26 NOTE — PROGRESS NOTES
Patient presents to clinic for follow up visit recently seen at urgent care on 10-20-21 dx with cellulitis of left breast. Urgent care prescribed Clindamycin 300 MG tablet x 10 days. Patient rates pain a 5 out of 10 on the pain scale. Pain is intermittent.

## 2021-10-26 NOTE — PROGRESS NOTES
Subjective:   Patient ID: Governor Crew is a 73year old male who underwent excision of recurrent left breast mastitis on July 2, 2021. Zahra Foster had a prior episode of left breast mastitis, excised October 30, 2020. Zahra Foster was last seen in the office i by mouth daily. • Coenzyme Q10 (COQ-10) 200 MG Oral Cap Take by mouth daily. • Folic Acid 0.8 MG Oral Cap Take by mouth daily.          Allergies:  Cephalosporins            Keflex                  RASH    Comment:CAPS    Objective:   Physical E with the patient. At this time, the patient is under tremendous amount of stress. He does not feel he could quit at this time. He states he has been smoking since he was 15years old.     Mavis Mcgowan MD

## 2021-10-27 ENCOUNTER — LAB ENCOUNTER (OUTPATIENT)
Dept: LAB | Age: 71
End: 2021-10-27
Attending: SURGERY
Payer: MEDICARE

## 2021-10-27 DIAGNOSIS — N61.0 MASTITIS: Primary | ICD-10-CM

## 2021-10-27 DIAGNOSIS — N61.0 MASTITIS: ICD-10-CM

## 2021-10-29 ENCOUNTER — LAB REQUISITION (OUTPATIENT)
Dept: LAB | Facility: HOSPITAL | Age: 71
End: 2021-10-29
Payer: MEDICARE

## 2021-10-29 DIAGNOSIS — N61.0 MASTITIS WITHOUT ABSCESS: ICD-10-CM

## 2021-10-29 PROCEDURE — 88307 TISSUE EXAM BY PATHOLOGIST: CPT | Performed by: SURGERY

## 2021-11-02 ENCOUNTER — APPOINTMENT (OUTPATIENT)
Dept: HEMATOLOGY/ONCOLOGY | Facility: HOSPITAL | Age: 71
End: 2021-11-02
Attending: SURGERY
Payer: MEDICARE

## 2021-11-09 ENCOUNTER — OFFICE VISIT (OUTPATIENT)
Dept: SURGERY | Facility: CLINIC | Age: 71
End: 2021-11-09

## 2021-11-09 ENCOUNTER — OFFICE VISIT (OUTPATIENT)
Dept: HEMATOLOGY/ONCOLOGY | Facility: HOSPITAL | Age: 71
End: 2021-11-09
Attending: SURGERY
Payer: MEDICARE

## 2021-11-09 VITALS
SYSTOLIC BLOOD PRESSURE: 119 MMHG | TEMPERATURE: 98 F | BODY MASS INDEX: 37 KG/M2 | HEART RATE: 92 BPM | RESPIRATION RATE: 16 BRPM | DIASTOLIC BLOOD PRESSURE: 72 MMHG | WEIGHT: 258 LBS | OXYGEN SATURATION: 95 %

## 2021-11-09 DIAGNOSIS — N60.12 MASTITIS CHRONIC, LEFT: Primary | ICD-10-CM

## 2021-11-09 PROCEDURE — 99024 POSTOP FOLLOW-UP VISIT: CPT | Performed by: SURGERY

## 2021-11-09 PROCEDURE — 99211 OFF/OP EST MAY X REQ PHY/QHP: CPT

## 2021-11-09 NOTE — PROGRESS NOTES
Patient here for follow up visit Mastitis without abscess. Denies any pain or discomfort at this time. Antibiotics completed. Medication and allergies reviewed and updated.

## 2021-11-11 NOTE — PROGRESS NOTES
Subjective:   Patient ID: Eloina Byrd is a 73year old male who follows up after left central lumpectomy performed for acute and chronic mastitis. His surgery was performed October 29, 2021. The patient states he has no complaints.   He has Comments: Left breast incision healing well  Skin:     General: Skin is warm and dry. Neurological:      Mental Status: He is alert and oriented to person, place, and time.    Psychiatric:         Behavior: Behavior normal.         Thought Conten

## 2021-12-09 DIAGNOSIS — E78.00 PURE HYPERCHOLESTEROLEMIA: ICD-10-CM

## 2021-12-09 RX ORDER — LOSARTAN POTASSIUM AND HYDROCHLOROTHIAZIDE 25; 100 MG/1; MG/1
1 TABLET ORAL DAILY
Qty: 90 TABLET | Refills: 1 | Status: SHIPPED | OUTPATIENT
Start: 2021-12-09

## 2021-12-09 RX ORDER — SIMVASTATIN 40 MG
40 TABLET ORAL NIGHTLY
Qty: 90 TABLET | Refills: 1 | Status: SHIPPED | OUTPATIENT
Start: 2021-12-09

## 2021-12-13 ENCOUNTER — TELEPHONE (OUTPATIENT)
Dept: HEMATOLOGY/ONCOLOGY | Facility: HOSPITAL | Age: 71
End: 2021-12-13

## 2021-12-13 NOTE — TELEPHONE ENCOUNTER
Ed calling to cancel smoking cessation visit. He is overwhelmed, his wife is in the hospital with a broken leg and they are trying to find a rehab for her. He has tried hypnosis, the patch caused a rash, Chantix and candy.  His insurance no longer cover

## 2021-12-15 ENCOUNTER — APPOINTMENT (OUTPATIENT)
Dept: HEMATOLOGY/ONCOLOGY | Facility: HOSPITAL | Age: 71
End: 2021-12-15
Attending: SURGERY
Payer: MEDICARE

## 2021-12-23 ENCOUNTER — TELEPHONE (OUTPATIENT)
Dept: HEMATOLOGY/ONCOLOGY | Facility: HOSPITAL | Age: 71
End: 2021-12-23

## 2021-12-23 NOTE — TELEPHONE ENCOUNTER
Received call from [facility] [place] requesting patient's phlebotomy order to be faxed to [phone] along with recent labs and MD office note. Above faxed as requested.

## 2022-01-12 ENCOUNTER — TELEPHONE (OUTPATIENT)
Dept: HEMATOLOGY/ONCOLOGY | Facility: HOSPITAL | Age: 72
End: 2022-01-12

## 2022-01-12 NOTE — TELEPHONE ENCOUNTER
Tamiko Masters from [facility] called. She said that [facility] needs to find the correct precription form online and that has to be filled out and faxed back to Tamiko Masters. The [address] [phone]; PHONE: [phone].  Patient gives blood at [facility] every

## 2022-01-28 ENCOUNTER — PATIENT MESSAGE (OUTPATIENT)
Dept: FAMILY MEDICINE CLINIC | Facility: CLINIC | Age: 72
End: 2022-01-28

## 2022-01-31 NOTE — TELEPHONE ENCOUNTER
From: Love Araujo  To: Ace Dixon DO  Sent: 1/28/2022 9:32 PM CST  Subject: Booster    Please Update My medical Records to show I received the [employer] Booster January 25, 2022  Thank You

## 2022-02-15 ENCOUNTER — OFFICE VISIT (OUTPATIENT)
Dept: HEMATOLOGY/ONCOLOGY | Facility: HOSPITAL | Age: 72
End: 2022-02-15
Attending: SURGERY
Payer: MEDICARE

## 2022-02-15 ENCOUNTER — OFFICE VISIT (OUTPATIENT)
Dept: SURGERY | Facility: CLINIC | Age: 72
End: 2022-02-15
Payer: MEDICARE

## 2022-02-15 VITALS
TEMPERATURE: 98 F | WEIGHT: 258 LBS | HEART RATE: 89 BPM | BODY MASS INDEX: 37 KG/M2 | OXYGEN SATURATION: 98 % | RESPIRATION RATE: 16 BRPM | DIASTOLIC BLOOD PRESSURE: 68 MMHG | SYSTOLIC BLOOD PRESSURE: 108 MMHG

## 2022-02-15 DIAGNOSIS — N61.0 ACUTE MASTITIS OF LEFT BREAST: Primary | ICD-10-CM

## 2022-02-15 PROCEDURE — 99212 OFFICE O/P EST SF 10 MIN: CPT | Performed by: SURGERY

## 2022-02-15 NOTE — PROGRESS NOTES
Dx: Chronic Left Mastitis     Patient presents to clinic for breast follow up. No new complaints at this time. Patient under a lot of life stressors with a sick wife. Medication and allergies reviewed and updated. Left centeral lumpectomy performed on 10/29/21.

## 2022-02-23 ENCOUNTER — OFFICE VISIT (OUTPATIENT)
Dept: FAMILY MEDICINE CLINIC | Facility: CLINIC | Age: 72
End: 2022-02-23
Payer: MEDICARE

## 2022-02-23 ENCOUNTER — HOSPITAL ENCOUNTER (OUTPATIENT)
Dept: GENERAL RADIOLOGY | Age: 72
Discharge: HOME | End: 2022-02-23
Attending: PHYSICIAN ASSISTANT
Payer: MEDICARE

## 2022-02-23 VITALS
BODY MASS INDEX: 36.36 KG/M2 | SYSTOLIC BLOOD PRESSURE: 118 MMHG | TEMPERATURE: 98 F | DIASTOLIC BLOOD PRESSURE: 76 MMHG | HEIGHT: 70 IN | HEART RATE: 88 BPM | OXYGEN SATURATION: 98 % | WEIGHT: 254 LBS | RESPIRATION RATE: 18 BRPM

## 2022-02-23 DIAGNOSIS — M54.42 LEFT-SIDED LOW BACK PAIN WITH LEFT-SIDED SCIATICA, UNSPECIFIED CHRONICITY: Primary | ICD-10-CM

## 2022-02-23 DIAGNOSIS — M54.42 LEFT-SIDED LOW BACK PAIN WITH LEFT-SIDED SCIATICA, UNSPECIFIED CHRONICITY: ICD-10-CM

## 2022-02-23 PROCEDURE — 99214 OFFICE O/P EST MOD 30 MIN: CPT | Performed by: PHYSICIAN ASSISTANT

## 2022-02-23 PROCEDURE — 72110 X-RAY EXAM L-2 SPINE 4/>VWS: CPT | Performed by: PHYSICIAN ASSISTANT

## 2022-02-23 PROCEDURE — 3008F BODY MASS INDEX DOCD: CPT | Performed by: PHYSICIAN ASSISTANT

## 2022-02-23 PROCEDURE — 3078F DIAST BP <80 MM HG: CPT | Performed by: PHYSICIAN ASSISTANT

## 2022-02-23 PROCEDURE — 3074F SYST BP LT 130 MM HG: CPT | Performed by: PHYSICIAN ASSISTANT

## 2022-02-23 RX ORDER — ACETAMINOPHEN AND CODEINE PHOSPHATE 300; 30 MG/1; MG/1
TABLET ORAL
COMMUNITY
Start: 2021-10-29

## 2022-02-23 RX ORDER — TIZANIDINE 4 MG/1
4 TABLET ORAL EVERY 6 HOURS PRN
Qty: 30 TABLET | Refills: 0 | Status: SHIPPED | OUTPATIENT
Start: 2022-02-23

## 2022-02-24 ENCOUNTER — TELEPHONE (OUTPATIENT)
Dept: FAMILY MEDICINE CLINIC | Facility: CLINIC | Age: 72
End: 2022-02-24

## 2022-02-24 NOTE — TELEPHONE ENCOUNTER
Pt would like to start PT as discussed and wants to know if massage therapy would be included as discussed. He wants to go the the [address] [place] [address] location.   pls advise

## 2022-03-07 ENCOUNTER — TELEPHONE (OUTPATIENT)
Dept: PHYSICAL THERAPY | Facility: HOSPITAL | Age: 72
End: 2022-03-07

## 2022-03-09 ENCOUNTER — TELEPHONE (OUTPATIENT)
Dept: FAMILY MEDICINE CLINIC | Facility: CLINIC | Age: 72
End: 2022-03-09

## 2022-03-09 ENCOUNTER — OFFICE VISIT (OUTPATIENT)
Dept: PHYSICAL THERAPY | Age: 72
End: 2022-03-09
Attending: PHYSICIAN ASSISTANT
Payer: MEDICARE

## 2022-03-09 DIAGNOSIS — Z12.11 COLON CANCER SCREENING: Primary | ICD-10-CM

## 2022-03-09 DIAGNOSIS — M54.42 LEFT-SIDED LOW BACK PAIN WITH LEFT-SIDED SCIATICA, UNSPECIFIED CHRONICITY: ICD-10-CM

## 2022-03-09 PROCEDURE — 97110 THERAPEUTIC EXERCISES: CPT | Performed by: PHYSICAL THERAPIST

## 2022-03-09 PROCEDURE — 97162 PT EVAL MOD COMPLEX 30 MIN: CPT | Performed by: PHYSICAL THERAPIST

## 2022-03-16 ENCOUNTER — OFFICE VISIT (OUTPATIENT)
Dept: PHYSICAL THERAPY | Age: 72
End: 2022-03-16
Attending: PHYSICIAN ASSISTANT
Payer: MEDICARE

## 2022-03-16 PROCEDURE — 97110 THERAPEUTIC EXERCISES: CPT | Performed by: PHYSICAL THERAPIST

## 2022-03-21 ENCOUNTER — OFFICE VISIT (OUTPATIENT)
Dept: PHYSICAL THERAPY | Age: 72
End: 2022-03-21
Attending: PHYSICIAN ASSISTANT
Payer: MEDICARE

## 2022-03-21 PROCEDURE — 97110 THERAPEUTIC EXERCISES: CPT | Performed by: PHYSICAL THERAPIST

## 2022-03-21 PROCEDURE — 97140 MANUAL THERAPY 1/> REGIONS: CPT | Performed by: PHYSICAL THERAPIST

## 2022-03-23 ENCOUNTER — OFFICE VISIT (OUTPATIENT)
Dept: PHYSICAL THERAPY | Age: 72
End: 2022-03-23
Attending: PHYSICIAN ASSISTANT
Payer: MEDICARE

## 2022-03-23 PROCEDURE — 97140 MANUAL THERAPY 1/> REGIONS: CPT | Performed by: PHYSICAL THERAPIST

## 2022-03-23 PROCEDURE — 97110 THERAPEUTIC EXERCISES: CPT | Performed by: PHYSICAL THERAPIST

## 2022-03-29 ENCOUNTER — OFFICE VISIT (OUTPATIENT)
Dept: PHYSICAL THERAPY | Age: 72
End: 2022-03-29
Attending: PHYSICIAN ASSISTANT
Payer: MEDICARE

## 2022-03-29 PROCEDURE — 97140 MANUAL THERAPY 1/> REGIONS: CPT | Performed by: PHYSICAL THERAPIST

## 2022-03-29 PROCEDURE — 97110 THERAPEUTIC EXERCISES: CPT | Performed by: PHYSICAL THERAPIST

## 2022-04-01 ENCOUNTER — OFFICE VISIT (OUTPATIENT)
Dept: SURGERY | Facility: CLINIC | Age: 72
End: 2022-04-01
Payer: MEDICARE

## 2022-04-01 ENCOUNTER — OFFICE VISIT (OUTPATIENT)
Dept: PHYSICAL THERAPY | Age: 72
End: 2022-04-01
Attending: PHYSICIAN ASSISTANT
Payer: MEDICARE

## 2022-04-01 VITALS
DIASTOLIC BLOOD PRESSURE: 70 MMHG | HEART RATE: 82 BPM | WEIGHT: 255 LBS | BODY MASS INDEX: 37 KG/M2 | SYSTOLIC BLOOD PRESSURE: 107 MMHG

## 2022-04-01 DIAGNOSIS — L72.3 INFLAMED SEBACEOUS CYST: Primary | ICD-10-CM

## 2022-04-01 PROCEDURE — 97110 THERAPEUTIC EXERCISES: CPT | Performed by: PHYSICAL THERAPIST

## 2022-04-01 PROCEDURE — 3078F DIAST BP <80 MM HG: CPT | Performed by: SURGERY

## 2022-04-01 PROCEDURE — 97140 MANUAL THERAPY 1/> REGIONS: CPT | Performed by: PHYSICAL THERAPIST

## 2022-04-01 PROCEDURE — 3074F SYST BP LT 130 MM HG: CPT | Performed by: SURGERY

## 2022-04-01 PROCEDURE — 99213 OFFICE O/P EST LOW 20 MIN: CPT | Performed by: SURGERY

## 2022-04-04 ENCOUNTER — OFFICE VISIT (OUTPATIENT)
Dept: PHYSICAL THERAPY | Age: 72
End: 2022-04-04
Attending: PHYSICIAN ASSISTANT
Payer: MEDICARE

## 2022-04-04 PROCEDURE — 97110 THERAPEUTIC EXERCISES: CPT | Performed by: PHYSICAL THERAPIST

## 2022-04-06 ENCOUNTER — OFFICE VISIT (OUTPATIENT)
Dept: PHYSICAL THERAPY | Age: 72
End: 2022-04-06
Attending: PHYSICIAN ASSISTANT
Payer: MEDICARE

## 2022-04-06 PROCEDURE — 97110 THERAPEUTIC EXERCISES: CPT | Performed by: PHYSICAL THERAPIST

## 2022-05-04 ENCOUNTER — OFFICE VISIT (OUTPATIENT)
Dept: SURGERY | Facility: CLINIC | Age: 72
End: 2022-05-04
Payer: MEDICARE

## 2022-05-04 VITALS
TEMPERATURE: 97 F | HEART RATE: 89 BPM | RESPIRATION RATE: 16 BRPM | DIASTOLIC BLOOD PRESSURE: 58 MMHG | SYSTOLIC BLOOD PRESSURE: 92 MMHG | BODY MASS INDEX: 36.65 KG/M2 | WEIGHT: 256 LBS | HEIGHT: 70 IN

## 2022-05-04 DIAGNOSIS — L72.3 SEBACEOUS CYST: Primary | ICD-10-CM

## 2022-05-04 PROCEDURE — 3078F DIAST BP <80 MM HG: CPT | Performed by: SURGERY

## 2022-05-04 PROCEDURE — 3074F SYST BP LT 130 MM HG: CPT | Performed by: SURGERY

## 2022-05-04 PROCEDURE — 3008F BODY MASS INDEX DOCD: CPT | Performed by: SURGERY

## 2022-05-04 PROCEDURE — 99213 OFFICE O/P EST LOW 20 MIN: CPT | Performed by: SURGERY

## 2022-05-04 RX ORDER — MAGNESIUM OXIDE 400 MG (241.3 MG MAGNESIUM) TABLET
800 TABLET DAILY
COMMUNITY

## 2022-05-09 ENCOUNTER — TELEPHONE (OUTPATIENT)
Dept: SURGERY | Facility: CLINIC | Age: 72
End: 2022-05-09

## 2022-05-13 ENCOUNTER — OFFICE VISIT (OUTPATIENT)
Dept: FAMILY MEDICINE CLINIC | Facility: CLINIC | Age: 72
End: 2022-05-13
Payer: MEDICARE

## 2022-05-13 VITALS
HEART RATE: 81 BPM | HEIGHT: 70 IN | RESPIRATION RATE: 16 BRPM | WEIGHT: 257 LBS | DIASTOLIC BLOOD PRESSURE: 64 MMHG | OXYGEN SATURATION: 97 % | SYSTOLIC BLOOD PRESSURE: 130 MMHG | BODY MASS INDEX: 36.79 KG/M2

## 2022-05-13 DIAGNOSIS — E66.01 SEVERE OBESITY (BMI 35.0-39.9) WITH COMORBIDITY (HCC): Chronic | ICD-10-CM

## 2022-05-13 DIAGNOSIS — Z99.89 OSA ON CPAP: ICD-10-CM

## 2022-05-13 DIAGNOSIS — R73.9 HYPERGLYCEMIA: ICD-10-CM

## 2022-05-13 DIAGNOSIS — E83.110 HEMOCHROMATOSIS ASSOCIATED WITH COMPOUND HETEROZYGOUS MUTATION IN HFE GENE (HCC): ICD-10-CM

## 2022-05-13 DIAGNOSIS — R53.83 OTHER FATIGUE: ICD-10-CM

## 2022-05-13 DIAGNOSIS — Q98.4 KLINEFELTER SYNDROME: ICD-10-CM

## 2022-05-13 DIAGNOSIS — E55.9 VITAMIN D DEFICIENCY: ICD-10-CM

## 2022-05-13 DIAGNOSIS — Z12.5 SCREENING PSA (PROSTATE SPECIFIC ANTIGEN): ICD-10-CM

## 2022-05-13 DIAGNOSIS — D69.6 THROMBOCYTOPENIA (HCC): ICD-10-CM

## 2022-05-13 DIAGNOSIS — D22.9 ATYPICAL NEVI: ICD-10-CM

## 2022-05-13 DIAGNOSIS — Z00.00 ENCOUNTER FOR ANNUAL HEALTH EXAMINATION: Primary | ICD-10-CM

## 2022-05-13 DIAGNOSIS — N18.31 STAGE 3A CHRONIC KIDNEY DISEASE (HCC): Chronic | ICD-10-CM

## 2022-05-13 DIAGNOSIS — Z12.2 SCREENING FOR LUNG CANCER: ICD-10-CM

## 2022-05-13 DIAGNOSIS — G47.33 OSA ON CPAP: ICD-10-CM

## 2022-05-13 DIAGNOSIS — E78.00 PURE HYPERCHOLESTEROLEMIA: ICD-10-CM

## 2022-05-13 PROCEDURE — 99397 PER PM REEVAL EST PAT 65+ YR: CPT | Performed by: FAMILY MEDICINE

## 2022-05-13 PROCEDURE — 3008F BODY MASS INDEX DOCD: CPT | Performed by: FAMILY MEDICINE

## 2022-05-13 PROCEDURE — 96160 PT-FOCUSED HLTH RISK ASSMT: CPT | Performed by: FAMILY MEDICINE

## 2022-05-13 PROCEDURE — 3075F SYST BP GE 130 - 139MM HG: CPT | Performed by: FAMILY MEDICINE

## 2022-05-13 PROCEDURE — 3078F DIAST BP <80 MM HG: CPT | Performed by: FAMILY MEDICINE

## 2022-05-13 PROCEDURE — G0439 PPPS, SUBSEQ VISIT: HCPCS | Performed by: FAMILY MEDICINE

## 2022-05-25 ENCOUNTER — LAB ENCOUNTER (OUTPATIENT)
Dept: LAB | Age: 72
End: 2022-05-25
Attending: FAMILY MEDICINE
Payer: MEDICARE

## 2022-05-25 DIAGNOSIS — D69.6 THROMBOCYTOPENIA (HCC): ICD-10-CM

## 2022-05-25 DIAGNOSIS — R73.9 HYPERGLYCEMIA: ICD-10-CM

## 2022-05-25 DIAGNOSIS — Z12.5 SCREENING PSA (PROSTATE SPECIFIC ANTIGEN): ICD-10-CM

## 2022-05-25 DIAGNOSIS — E83.110 HEMOCHROMATOSIS ASSOCIATED WITH COMPOUND HETEROZYGOUS MUTATION IN HFE GENE (HCC): ICD-10-CM

## 2022-05-25 DIAGNOSIS — E55.9 VITAMIN D DEFICIENCY: ICD-10-CM

## 2022-05-25 DIAGNOSIS — R53.83 OTHER FATIGUE: ICD-10-CM

## 2022-05-25 DIAGNOSIS — E78.00 PURE HYPERCHOLESTEROLEMIA: ICD-10-CM

## 2022-05-25 LAB
ALBUMIN SERPL-MCNC: 3.9 G/DL (ref 3.4–5)
ALBUMIN/GLOB SERPL: 0.9 {RATIO} (ref 1–2)
ALP LIVER SERPL-CCNC: 55 U/L
ALT SERPL-CCNC: 33 U/L
ANION GAP SERPL CALC-SCNC: 4 MMOL/L (ref 0–18)
AST SERPL-CCNC: 15 U/L (ref 15–37)
BASOPHILS # BLD AUTO: 0.03 X10(3) UL (ref 0–0.2)
BASOPHILS NFR BLD AUTO: 0.5 %
BILIRUB SERPL-MCNC: 0.6 MG/DL (ref 0.1–2)
BUN BLD-MCNC: 10 MG/DL (ref 7–18)
CALCIUM BLD-MCNC: 9.7 MG/DL (ref 8.5–10.1)
CHLORIDE SERPL-SCNC: 102 MMOL/L (ref 98–112)
CHOLEST SERPL-MCNC: 118 MG/DL (ref ?–200)
CO2 SERPL-SCNC: 28 MMOL/L (ref 21–32)
COMPLEXED PSA SERPL-MCNC: 1.93 NG/ML (ref ?–4)
CREAT BLD-MCNC: 1.3 MG/DL
DEPRECATED HBV CORE AB SER IA-ACNC: 63.5 NG/ML
EOSINOPHIL # BLD AUTO: 0.11 X10(3) UL (ref 0–0.7)
EOSINOPHIL NFR BLD AUTO: 1.7 %
ERYTHROCYTE [DISTWIDTH] IN BLOOD BY AUTOMATED COUNT: 13 %
EST. AVERAGE GLUCOSE BLD GHB EST-MCNC: 128 MG/DL (ref 68–126)
FASTING PATIENT LIPID ANSWER: YES
FASTING STATUS PATIENT QL REPORTED: YES
GLOBULIN PLAS-MCNC: 4.4 G/DL (ref 2.8–4.4)
GLUCOSE BLD-MCNC: 108 MG/DL (ref 70–99)
HBA1C MFR BLD: 6.1 % (ref ?–5.7)
HCT VFR BLD AUTO: 42.8 %
HDLC SERPL-MCNC: 46 MG/DL (ref 40–59)
HGB BLD-MCNC: 14.3 G/DL
IMM GRANULOCYTES # BLD AUTO: 0.02 X10(3) UL (ref 0–1)
IMM GRANULOCYTES NFR BLD: 0.3 %
IRON SATN MFR SERPL: 30 %
IRON SERPL-MCNC: 121 UG/DL
LDLC SERPL CALC-MCNC: 54 MG/DL (ref ?–100)
LYMPHOCYTES # BLD AUTO: 2.07 X10(3) UL (ref 1–4)
LYMPHOCYTES NFR BLD AUTO: 31.8 %
MCH RBC QN AUTO: 33.1 PG (ref 26–34)
MCHC RBC AUTO-ENTMCNC: 33.4 G/DL (ref 31–37)
MCV RBC AUTO: 99.1 FL
MONOCYTES # BLD AUTO: 0.74 X10(3) UL (ref 0.1–1)
MONOCYTES NFR BLD AUTO: 11.4 %
NEUTROPHILS # BLD AUTO: 3.54 X10 (3) UL (ref 1.5–7.7)
NEUTROPHILS # BLD AUTO: 3.54 X10(3) UL (ref 1.5–7.7)
NEUTROPHILS NFR BLD AUTO: 54.3 %
NONHDLC SERPL-MCNC: 72 MG/DL (ref ?–130)
OSMOLALITY SERPL CALC.SUM OF ELEC: 278 MOSM/KG (ref 275–295)
PLATELET # BLD AUTO: 187 10(3)UL (ref 150–450)
POTASSIUM SERPL-SCNC: 3.9 MMOL/L (ref 3.5–5.1)
PROT SERPL-MCNC: 8.3 G/DL (ref 6.4–8.2)
RBC # BLD AUTO: 4.32 X10(6)UL
SODIUM SERPL-SCNC: 134 MMOL/L (ref 136–145)
T4 FREE SERPL-MCNC: 0.9 NG/DL (ref 0.8–1.7)
TIBC SERPL-MCNC: 407 UG/DL (ref 240–450)
TRANSFERRIN SERPL-MCNC: 273 MG/DL (ref 200–360)
TRIGL SERPL-MCNC: 91 MG/DL (ref 30–149)
TSI SER-ACNC: 1.98 MIU/ML (ref 0.36–3.74)
VIT B12 SERPL-MCNC: 861 PG/ML (ref 193–986)
VIT D+METAB SERPL-MCNC: 26.6 NG/ML (ref 30–100)
VLDLC SERPL CALC-MCNC: 13 MG/DL (ref 0–30)
WBC # BLD AUTO: 6.5 X10(3) UL (ref 4–11)

## 2022-05-25 PROCEDURE — 83036 HEMOGLOBIN GLYCOSYLATED A1C: CPT

## 2022-05-25 PROCEDURE — 80061 LIPID PANEL: CPT

## 2022-05-25 PROCEDURE — 84443 ASSAY THYROID STIM HORMONE: CPT

## 2022-05-25 PROCEDURE — 84439 ASSAY OF FREE THYROXINE: CPT

## 2022-05-25 PROCEDURE — 82306 VITAMIN D 25 HYDROXY: CPT

## 2022-05-25 PROCEDURE — 83550 IRON BINDING TEST: CPT

## 2022-05-25 PROCEDURE — 85025 COMPLETE CBC W/AUTO DIFF WBC: CPT

## 2022-05-25 PROCEDURE — 80053 COMPREHEN METABOLIC PANEL: CPT

## 2022-05-25 PROCEDURE — 82607 VITAMIN B-12: CPT

## 2022-05-25 PROCEDURE — 82728 ASSAY OF FERRITIN: CPT

## 2022-05-25 PROCEDURE — 36415 COLL VENOUS BLD VENIPUNCTURE: CPT

## 2022-05-25 PROCEDURE — 83540 ASSAY OF IRON: CPT

## 2022-06-14 DIAGNOSIS — E78.00 PURE HYPERCHOLESTEROLEMIA: ICD-10-CM

## 2022-06-14 RX ORDER — SIMVASTATIN 40 MG
40 TABLET ORAL NIGHTLY
Qty: 90 TABLET | Refills: 1 | Status: SHIPPED | OUTPATIENT
Start: 2022-06-14

## 2022-06-14 RX ORDER — LOSARTAN POTASSIUM AND HYDROCHLOROTHIAZIDE 25; 100 MG/1; MG/1
1 TABLET ORAL DAILY
Qty: 90 TABLET | Refills: 1 | Status: SHIPPED | OUTPATIENT
Start: 2022-06-14

## 2022-06-20 ENCOUNTER — TELEPHONE (OUTPATIENT)
Dept: FAMILY MEDICINE CLINIC | Facility: CLINIC | Age: 72
End: 2022-06-20

## 2022-06-20 NOTE — TELEPHONE ENCOUNTER
Pt's CPAP machine is 8 yrs old and showing an error message. Pt contacted supplier and was told he will need a new machine and to contact primary for order.   pls advise

## 2022-07-18 ENCOUNTER — TELEPHONE (OUTPATIENT)
Dept: FAMILY MEDICINE CLINIC | Facility: CLINIC | Age: 72
End: 2022-07-18

## 2022-07-18 DIAGNOSIS — E66.01 SEVERE OBESITY (BMI 35.0-39.9) WITH COMORBIDITY (HCC): Primary | ICD-10-CM

## 2022-07-18 DIAGNOSIS — Z99.89 OSA ON CPAP: ICD-10-CM

## 2022-07-18 DIAGNOSIS — G47.33 OSA ON CPAP: ICD-10-CM

## 2022-07-18 NOTE — TELEPHONE ENCOUNTER
Pt calling and states he needs a new pressure setting rx for his cpap machine     Pt uses [facility]  [phone]    Please call pt back

## 2022-07-19 NOTE — TELEPHONE ENCOUNTER
Per 2012 sleep study :  Autotitrating CPAP machine at a range of 12-14 cm of H2O with  C-Flex or EPR of 3 cm of H2O,     Motor life exceeded shows on his machine  Needs new order with pressure reading,     Okay to send his old pressure readings or do you want a new sleep study done to update his pressure readings? Pt did state old readings was working fine for him.

## 2022-08-18 ENCOUNTER — TELEPHONE (OUTPATIENT)
Dept: FAMILY MEDICINE CLINIC | Facility: CLINIC | Age: 72
End: 2022-08-18

## 2022-08-25 ENCOUNTER — MED REC SCAN ONLY (OUTPATIENT)
Dept: FAMILY MEDICINE CLINIC | Facility: CLINIC | Age: 72
End: 2022-08-25

## 2022-09-30 ENCOUNTER — TELEPHONE (OUTPATIENT)
Dept: HEMATOLOGY/ONCOLOGY | Facility: HOSPITAL | Age: 72
End: 2022-09-30

## 2022-09-30 NOTE — TELEPHONE ENCOUNTER
Received fax from [facility] for an updated order for phlebotomy. Form filled out and faxed to them along with hemochromatosis lab results @ [phone].

## 2022-09-30 NOTE — TELEPHONE ENCOUNTER
Patient called and said he needs an order/prescription to go and donate blood next week. Please call. Thank you.

## 2022-10-25 ENCOUNTER — NURSE ONLY (OUTPATIENT)
Dept: FAMILY MEDICINE CLINIC | Facility: CLINIC | Age: 72
End: 2022-10-25
Payer: MEDICARE

## 2022-10-25 PROCEDURE — 90662 IIV NO PRSV INCREASED AG IM: CPT | Performed by: FAMILY MEDICINE

## 2022-10-25 PROCEDURE — G0008 ADMIN INFLUENZA VIRUS VAC: HCPCS | Performed by: FAMILY MEDICINE

## 2022-11-07 ENCOUNTER — LAB ENCOUNTER (OUTPATIENT)
Dept: LAB | Age: 72
End: 2022-11-07
Attending: FAMILY MEDICINE
Payer: MEDICARE

## 2022-11-07 DIAGNOSIS — R94.4 DECREASED GFR: ICD-10-CM

## 2022-11-07 DIAGNOSIS — R73.9 HYPERGLYCEMIA: ICD-10-CM

## 2022-11-07 DIAGNOSIS — E55.9 VITAMIN D DEFICIENCY: ICD-10-CM

## 2022-11-07 DIAGNOSIS — E87.1 SERUM SODIUM DECREASED: ICD-10-CM

## 2022-11-07 LAB
ALBUMIN SERPL-MCNC: 3.7 G/DL (ref 3.4–5)
ALBUMIN/GLOB SERPL: 1 {RATIO} (ref 1–2)
ALP LIVER SERPL-CCNC: 45 U/L
ALT SERPL-CCNC: 32 U/L
ANION GAP SERPL CALC-SCNC: 7 MMOL/L (ref 0–18)
AST SERPL-CCNC: 23 U/L (ref 15–37)
BILIRUB SERPL-MCNC: 0.7 MG/DL (ref 0.1–2)
BUN BLD-MCNC: 26 MG/DL (ref 7–18)
CALCIUM BLD-MCNC: 9.5 MG/DL (ref 8.5–10.1)
CHLORIDE SERPL-SCNC: 104 MMOL/L (ref 98–112)
CO2 SERPL-SCNC: 27 MMOL/L (ref 21–32)
CREAT BLD-MCNC: 1.25 MG/DL
EST. AVERAGE GLUCOSE BLD GHB EST-MCNC: 120 MG/DL (ref 68–126)
FASTING STATUS PATIENT QL REPORTED: YES
GFR SERPLBLD BASED ON 1.73 SQ M-ARVRAT: 62 ML/MIN/1.73M2 (ref 60–?)
GLOBULIN PLAS-MCNC: 3.8 G/DL (ref 2.8–4.4)
GLUCOSE BLD-MCNC: 117 MG/DL (ref 70–99)
HBA1C MFR BLD: 5.8 % (ref ?–5.7)
OSMOLALITY SERPL CALC.SUM OF ELEC: 292 MOSM/KG (ref 275–295)
POTASSIUM SERPL-SCNC: 3.7 MMOL/L (ref 3.5–5.1)
PROT SERPL-MCNC: 7.5 G/DL (ref 6.4–8.2)
SODIUM SERPL-SCNC: 138 MMOL/L (ref 136–145)
VIT D+METAB SERPL-MCNC: 56.3 NG/ML (ref 30–100)

## 2022-11-07 PROCEDURE — 83036 HEMOGLOBIN GLYCOSYLATED A1C: CPT

## 2022-11-07 PROCEDURE — 80053 COMPREHEN METABOLIC PANEL: CPT

## 2022-11-07 PROCEDURE — 82306 VITAMIN D 25 HYDROXY: CPT

## 2022-11-07 PROCEDURE — 36415 COLL VENOUS BLD VENIPUNCTURE: CPT

## 2022-11-18 ENCOUNTER — OFFICE VISIT (OUTPATIENT)
Dept: HEMATOLOGY/ONCOLOGY | Facility: HOSPITAL | Age: 72
End: 2022-11-18
Attending: INTERNAL MEDICINE
Payer: MEDICARE

## 2022-11-18 VITALS
BODY MASS INDEX: 37 KG/M2 | SYSTOLIC BLOOD PRESSURE: 110 MMHG | WEIGHT: 254.81 LBS | RESPIRATION RATE: 18 BRPM | DIASTOLIC BLOOD PRESSURE: 61 MMHG | HEART RATE: 93 BPM | TEMPERATURE: 97 F | OXYGEN SATURATION: 96 %

## 2022-11-18 DIAGNOSIS — E83.110 HEMOCHROMATOSIS, HEREDITARY (HCC): Primary | ICD-10-CM

## 2022-11-18 PROCEDURE — 99213 OFFICE O/P EST LOW 20 MIN: CPT | Performed by: INTERNAL MEDICINE

## 2022-11-18 NOTE — PROGRESS NOTES
Patient is here for MD f/u for hereditary hemochromatosis. Last phlebotomy was on October 5th at [facility]. Feeling well.        Education Record    Learner:  Patient    Disease / Diagnosis:  hereditary hemochromatosis    Barriers / Limitations:  None   Comments:    Method:  Discussion   Comments:    General Topics:  Plan of care reviewed   Comments:    Outcome:  Shows understanding   Comments:

## 2022-11-21 PROBLEM — E83.110 HEMOCHROMATOSIS, HEREDITARY: Status: ACTIVE | Noted: 2022-11-21

## 2022-11-21 PROBLEM — E83.110 HEMOCHROMATOSIS, HEREDITARY (HCC): Status: ACTIVE | Noted: 2022-11-21

## 2022-11-22 NOTE — PROGRESS NOTES
[facility]    PATIENT'S NAME: Marquis Mosher   ATTENDING PHYSICIAN: Chela Rodrigues M.D. PATIENT ACCOUNT #: [de-identified] LOCATION: [address] [place] [address] RECORD #: [id number] YOB: 1950   DATE OF SERVICE: 11/18/2022       [facility] PROGRESS NOTE    CHIEF COMPLAINT:  Followup for history of hemochromatosis. HISTORY OF PRESENT ILLNESS:  The patient is a 29-year-old male who has hereditary hemochromatosis. He has homozygosity for an H63D mutation. He has been getting phlebotomies every 3 to 4 months through [facility]. He is frustrated in that they told him they apparently could not use his blood because he did not have a C282Y mutation. I do not understand the rationale for this, and I will check with our blood bank director, Dr. Anamaria Lovett. Otherwise he has had no other major new physical complaints. He sees Dr. Mode Brown for his primary care. He denies any major new issues. He has no bone pain, cough, shortness of breath, abdominal pain, nausea, or vomiting. He had his last phlebotomy October 5 at [facility]. He feels well. MEDICATIONS:  His current medications include albuterol HFA inhaler, 2 puffs q.4 h. p.r.n.; aspirin 81 mg daily; Co-Enzyme Q10 at 339 mg daily; folic acid 486 mcg daily; Arnica Gel p.r.n.; losartan/hydrochlorothiazide 100/25, one tablet daily; Lutein 20 mg daily; pyridoxine 1000 mg twice weekly; simvastatin 40 mg nightly; tizanidine 4 mg q.6 h. p.r.n.; vitamin D3 at 2000 units daily. PHYSICAL EXAMINATION:    GENERAL:  He is a well-appearing overweight male in no acute distress. VITAL SIGNS:  His performance status is 0. His weight is 254 pounds. Blood pressure is 110/61, pulse 93, respiratory rate 20, temperature 97.3. HEENT:  Unremarkable. LYMPHATICS:  No adenopathy. LUNGS:  Clear. HEART:  Normal.  ABDOMEN:  No hepatosplenomegaly or tenderness. EXTREMITIES:  No clubbing, cyanosis, or edema. NEUROLOGIC:  Intact.     LABORATORY DATA:  His last iron studies were done in May; his iron saturation was 30%, his ferritin was 63. He had chemistries done November 7; these were unremarkable. IMPRESSION:  History of hemochromatosis. He has homozygosity for the H63D mutation. He is well-controlled with the current plan of donating blood every 3 to 4 months. He has adequate iron stores, and they are not excessive. We talked about the fact that these mutations affect iron absorption in the duodenum and do not affect his bone marrow function, so there is no reason why his blood could not be used. I will follow through with Dr. Braxton Howard regarding why this is the case. 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 The patient requires orders from [employer] once a year, and I will ask him just to see my colleague, Dr. Elwanda Aase, briefly in a year. I will be retiring in April of this coming year. Dictated By Sony Shirley M.D.  d: 11/21/2022 08:15:39  t: 11/21/2022 18:04:44  Job [id number]  AH/    cc:  Kevin Prakash D.O.

## 2022-12-15 ENCOUNTER — PATIENT MESSAGE (OUTPATIENT)
Facility: LOCATION | Age: 72
End: 2022-12-15

## 2022-12-15 ENCOUNTER — OFFICE VISIT (OUTPATIENT)
Facility: LOCATION | Age: 72
End: 2022-12-15
Payer: MEDICARE

## 2022-12-15 VITALS — HEART RATE: 95 BPM | TEMPERATURE: 97 F

## 2022-12-15 DIAGNOSIS — L02.31 LEFT BUTTOCK ABSCESS: Primary | ICD-10-CM

## 2022-12-15 PROCEDURE — 10061 I&D ABSCESS COMP/MULTIPLE: CPT | Performed by: SURGERY

## 2022-12-15 PROCEDURE — 99213 OFFICE O/P EST LOW 20 MIN: CPT | Performed by: SURGERY

## 2022-12-15 RX ORDER — CIPROFLOXACIN 500 MG/1
500 TABLET, FILM COATED ORAL 2 TIMES DAILY
Qty: 20 TABLET | Refills: 0 | Status: SHIPPED | OUTPATIENT
Start: 2022-12-15 | End: 2022-12-25

## 2022-12-15 NOTE — TELEPHONE ENCOUNTER
From: Marielena Araujo  To: Rogelio Estrada MD  Sent: 12/15/2022 8:31 AM CST  Subject: sebaceous cyst    Just letting you know the sebaceous cyst that I will be seeing you for on Monday December 19 @ 2:00pm, burst overnight on December 14 in like 5 locations. Blood and puss oozing now and the bulge is still quite large and painful.   Lydia Simons

## 2022-12-15 NOTE — TELEPHONE ENCOUNTER
Spoke to patient. Made an appointment for today with Dr. Addis Alexandra for evaluation. Patient voiced understanding.     Future Appointments   Date Time Provider Romeo Angelica   12/15/2022  3:30 PM Michael Brady MD [facility]   12/19/2022  2:00 PM Kiana Huber MD [facility]   11/21/2023 10:00 AM Costa Canales MD [address] [place] [address]

## 2022-12-19 ENCOUNTER — OFFICE VISIT (OUTPATIENT)
Facility: LOCATION | Age: 72
End: 2022-12-19
Payer: MEDICARE

## 2022-12-19 VITALS — HEART RATE: 92 BPM | TEMPERATURE: 98 F

## 2022-12-19 DIAGNOSIS — L72.3 SEBACEOUS CYST: Primary | ICD-10-CM

## 2022-12-19 PROCEDURE — 99212 OFFICE O/P EST SF 10 MIN: CPT | Performed by: SURGERY

## 2022-12-27 ENCOUNTER — OFFICE VISIT (OUTPATIENT)
Facility: LOCATION | Age: 72
End: 2022-12-27
Payer: MEDICARE

## 2022-12-27 VITALS — HEART RATE: 98 BPM | TEMPERATURE: 98 F

## 2022-12-27 DIAGNOSIS — Z51.89 VISIT FOR WOUND CHECK: ICD-10-CM

## 2022-12-27 DIAGNOSIS — L72.3 SEBACEOUS CYST: Primary | ICD-10-CM

## 2022-12-27 PROCEDURE — 99212 OFFICE O/P EST SF 10 MIN: CPT | Performed by: PHYSICIAN ASSISTANT

## 2023-01-03 ENCOUNTER — TELEPHONE (OUTPATIENT)
Dept: FAMILY MEDICINE CLINIC | Facility: CLINIC | Age: 73
End: 2023-01-03

## 2023-01-03 DIAGNOSIS — E78.00 PURE HYPERCHOLESTEROLEMIA: ICD-10-CM

## 2023-01-03 RX ORDER — LOSARTAN POTASSIUM AND HYDROCHLOROTHIAZIDE 25; 100 MG/1; MG/1
1 TABLET ORAL DAILY
Qty: 90 TABLET | Refills: 0 | Status: SHIPPED | OUTPATIENT
Start: 2023-01-03

## 2023-01-03 RX ORDER — SIMVASTATIN 40 MG
40 TABLET ORAL NIGHTLY
Qty: 90 TABLET | Refills: 0 | Status: SHIPPED | OUTPATIENT
Start: 2023-01-03

## 2023-01-03 NOTE — TELEPHONE ENCOUNTER
losartan-hydroCHLOROthiazide 100-25 MG Oral Tab       simvastatin 40 MG Oral Tab       [facility]-90 day supply

## 2023-01-07 NOTE — PROCEDURES
Procedure note    Date of procedure-December 15, 2022    Preoperative diagnosis-abscess of the left buttock    Indication for procedure-symptomatic tender left buttock abscess    Postoperative diagnosis-same    Procedure-   incision, drainage, debridement abscess left buttock    Anesthesia- local    Surgeon- Luis Santiago    Discussed with Patient-the potential risks and benefits of the procedure were reviewed in detail with the patient including but not limited to bleeding, infection, seroma hematoma formation, possibility of recurrence, possible need for further intervention pending clinical course. These another potential intraoperative and perioperative risks were reviewed. The patient and any family members that are present have no further questions at this time and do wish to proceed as discussed. Summary of procedure-the patient was placed on the examination table in a prone position. The area of erythema and tenderness was prepped with Betadine. The skin was infiltrated with local anesthesia with epinephrine. A cruciate incision was made. A fair amount of purulent material was retrieved. The wound was probed for loculations. The wound was then irrigated. The wound was packed open. Gauze dressing was placed. The patient did tolerate the procedure well. Discharge instructions were given to the patient as well as any family members present. They do not have any further questions at this time. Follow-up directions were also given. The patient was discharged from the office in good condition.     Mauro Parada M.D.

## 2023-01-11 ENCOUNTER — TELEMEDICINE (OUTPATIENT)
Dept: FAMILY MEDICINE CLINIC | Facility: CLINIC | Age: 73
End: 2023-01-11
Payer: COMMERCIAL

## 2023-01-11 DIAGNOSIS — I10 ESSENTIAL HYPERTENSION, BENIGN: ICD-10-CM

## 2023-01-11 DIAGNOSIS — Z99.89 OSA ON CPAP: Primary | ICD-10-CM

## 2023-01-11 DIAGNOSIS — G47.33 OSA (OBSTRUCTIVE SLEEP APNEA): ICD-10-CM

## 2023-01-11 DIAGNOSIS — E78.00 PURE HYPERCHOLESTEROLEMIA: ICD-10-CM

## 2023-01-11 DIAGNOSIS — Z72.0 TOBACCO ABUSE: ICD-10-CM

## 2023-01-11 DIAGNOSIS — G47.33 OSA ON CPAP: Primary | ICD-10-CM

## 2023-01-11 PROCEDURE — 99214 OFFICE O/P EST MOD 30 MIN: CPT | Performed by: FAMILY MEDICINE

## 2023-01-11 RX ORDER — ALBUTEROL SULFATE 90 UG/1
2 AEROSOL, METERED RESPIRATORY (INHALATION) EVERY 4 HOURS PRN
Qty: 3 EACH | Refills: 0 | Status: SHIPPED | OUTPATIENT
Start: 2023-01-11 | End: 2023-01-16

## 2023-01-12 DIAGNOSIS — E78.00 PURE HYPERCHOLESTEROLEMIA: ICD-10-CM

## 2023-01-13 RX ORDER — SIMVASTATIN 40 MG
TABLET ORAL
Qty: 90 TABLET | Refills: 3 | Status: SHIPPED | OUTPATIENT
Start: 2023-01-13

## 2023-01-13 RX ORDER — LOSARTAN POTASSIUM AND HYDROCHLOROTHIAZIDE 25; 100 MG/1; MG/1
1 TABLET ORAL DAILY
Qty: 90 TABLET | Refills: 3 | Status: SHIPPED | OUTPATIENT
Start: 2023-01-13

## 2023-02-10 ENCOUNTER — TELEPHONE (OUTPATIENT)
Dept: HEMATOLOGY/ONCOLOGY | Facility: HOSPITAL | Age: 73
End: 2023-02-10

## 2023-02-10 NOTE — TELEPHONE ENCOUNTER
[facility]: [phone]  The therapeutic blood draws doesn't match what you discussed previously please correct form. Patient doesn't have Hereditary Hemochromatosis. This can be faxed : [phone].  Thanks [employer]

## 2023-02-10 NOTE — TELEPHONE ENCOUNTER
Returned called to [facility]. Left voicemail message asking what the issue is with his form. This seems to be ongoing problem every few months. [facility]. Await call back.

## 2023-02-13 ENCOUNTER — TELEPHONE (OUTPATIENT)
Dept: HEMATOLOGY/ONCOLOGY | Facility: HOSPITAL | Age: 73
End: 2023-02-13

## 2023-02-13 NOTE — TELEPHONE ENCOUNTER
Spoke with Nikkie Loyola at [facility]. She will fax a new form out for completion to our office to ensure patient can continue to have phlebotomies as planned. Fax number confirmed with her.

## 2023-02-13 NOTE — TELEPHONE ENCOUNTER
Gabino Blanton called regarding a phlebotomy. She said they do not know the cause of the patient's iron being high. Please call back at [phone]. Thank you.

## 2023-03-03 ENCOUNTER — TELEPHONE (OUTPATIENT)
Dept: FAMILY MEDICINE CLINIC | Facility: CLINIC | Age: 73
End: 2023-03-03

## 2023-03-03 DIAGNOSIS — Z12.11 SCREENING FOR COLON CANCER: Primary | ICD-10-CM

## 2023-03-24 DIAGNOSIS — Z72.0 TOBACCO ABUSE: ICD-10-CM

## 2023-03-24 RX ORDER — ALBUTEROL SULFATE 90 UG/1
AEROSOL, METERED RESPIRATORY (INHALATION)
Qty: 108 G | Refills: 3 | Status: SHIPPED | OUTPATIENT
Start: 2023-03-24

## 2023-04-12 ENCOUNTER — OFFICE VISIT (OUTPATIENT)
Facility: LOCATION | Age: 73
End: 2023-04-12
Payer: COMMERCIAL

## 2023-04-12 VITALS — TEMPERATURE: 98 F | HEART RATE: 88 BPM

## 2023-04-12 DIAGNOSIS — L72.3 SEBACEOUS CYST: Primary | ICD-10-CM

## 2023-05-10 ENCOUNTER — TELEPHONE (OUTPATIENT)
Dept: HEMATOLOGY/ONCOLOGY | Age: 73
End: 2023-05-10

## 2023-05-10 NOTE — TELEPHONE ENCOUNTER
Returned patient's phone call. The [facility] [place],which usually accepts his blood for donation, is now telling him that they cannot accept his blood. He has a call out to the [address] [place] [address] and is trying to figure it out. I told him that I can call Keisha Ortiz and see what we can do to help. He had to answer a phone call during our phone call so I will call him again later.

## 2023-05-10 NOTE — TELEPHONE ENCOUNTER
Pt is calling to speak to a nurse because he is being told by [facility] that he is not able to donate blood anymore. They told him he does not have hereditary Hemochromatosis and his blood would be thrown out. He would like a call back to discuss this with a nurse.  Please advise-NL

## 2023-05-12 ENCOUNTER — OFFICE VISIT (OUTPATIENT)
Dept: FAMILY MEDICINE CLINIC | Facility: CLINIC | Age: 73
End: 2023-05-12
Payer: COMMERCIAL

## 2023-05-12 VITALS
HEIGHT: 69 IN | OXYGEN SATURATION: 97 % | BODY MASS INDEX: 39.1 KG/M2 | DIASTOLIC BLOOD PRESSURE: 86 MMHG | RESPIRATION RATE: 16 BRPM | SYSTOLIC BLOOD PRESSURE: 118 MMHG | WEIGHT: 264 LBS | HEART RATE: 96 BPM

## 2023-05-12 DIAGNOSIS — E83.110 HEMOCHROMATOSIS ASSOCIATED WITH COMPOUND HETEROZYGOUS MUTATION IN HFE GENE (HCC): ICD-10-CM

## 2023-05-12 DIAGNOSIS — Z00.00 ENCOUNTER FOR ANNUAL HEALTH EXAMINATION: Primary | ICD-10-CM

## 2023-05-12 DIAGNOSIS — R53.83 OTHER FATIGUE: ICD-10-CM

## 2023-05-12 DIAGNOSIS — E78.00 PURE HYPERCHOLESTEROLEMIA: ICD-10-CM

## 2023-05-12 DIAGNOSIS — E55.9 VITAMIN D DEFICIENCY: ICD-10-CM

## 2023-05-12 DIAGNOSIS — Z87.891 HX OF TOBACCO USE, PRESENTING HAZARDS TO HEALTH: ICD-10-CM

## 2023-05-12 DIAGNOSIS — Z12.2 SCREENING FOR LUNG CANCER: ICD-10-CM

## 2023-05-12 DIAGNOSIS — G47.33 OSA ON CPAP: ICD-10-CM

## 2023-05-12 DIAGNOSIS — Z99.89 OSA ON CPAP: ICD-10-CM

## 2023-05-12 DIAGNOSIS — R73.9 HYPERGLYCEMIA: ICD-10-CM

## 2023-05-12 DIAGNOSIS — D22.9 ATYPICAL NEVI: ICD-10-CM

## 2023-05-12 DIAGNOSIS — Q98.4 KLINEFELTER SYNDROME: ICD-10-CM

## 2023-05-12 DIAGNOSIS — E83.110 HEMOCHROMATOSIS, HEREDITARY (HCC): ICD-10-CM

## 2023-05-12 DIAGNOSIS — Z12.5 SCREENING PSA (PROSTATE SPECIFIC ANTIGEN): ICD-10-CM

## 2023-05-12 DIAGNOSIS — E66.01 SEVERE OBESITY (BMI 35.0-39.9) WITH COMORBIDITY (HCC): Chronic | ICD-10-CM

## 2023-05-12 DIAGNOSIS — D69.6 THROMBOCYTOPENIA (HCC): Chronic | ICD-10-CM

## 2023-05-12 DIAGNOSIS — N18.31 STAGE 3A CHRONIC KIDNEY DISEASE (HCC): Chronic | ICD-10-CM

## 2023-05-12 DIAGNOSIS — Z72.0 TOBACCO ABUSE: ICD-10-CM

## 2023-05-12 DIAGNOSIS — Z12.11 COLON CANCER SCREENING: ICD-10-CM

## 2023-06-14 ENCOUNTER — HOSPITAL ENCOUNTER (OUTPATIENT)
Dept: LAB | Facility: HOSPITAL | Age: 73
Discharge: HOME | End: 2023-06-14
Attending: FAMILY MEDICINE
Payer: MEDICARE

## 2023-06-14 DIAGNOSIS — R53.83 OTHER FATIGUE: ICD-10-CM

## 2023-06-14 DIAGNOSIS — E55.9 VITAMIN D DEFICIENCY: ICD-10-CM

## 2023-06-14 DIAGNOSIS — R73.9 HYPERGLYCEMIA: ICD-10-CM

## 2023-06-14 DIAGNOSIS — Z12.5 SCREENING PSA (PROSTATE SPECIFIC ANTIGEN): ICD-10-CM

## 2023-06-14 DIAGNOSIS — E83.110 HEMOCHROMATOSIS ASSOCIATED WITH COMPOUND HETEROZYGOUS MUTATION IN HFE GENE (HCC): ICD-10-CM

## 2023-06-14 DIAGNOSIS — E83.110 HEMOCHROMATOSIS, HEREDITARY (HCC): ICD-10-CM

## 2023-06-14 DIAGNOSIS — E78.00 PURE HYPERCHOLESTEROLEMIA: ICD-10-CM

## 2023-06-14 LAB
ALBUMIN SERPL-MCNC: 3.7 G/DL (ref 3.4–5)
ALBUMIN/GLOB SERPL: 0.9 {RATIO} (ref 1–2)
ALP LIVER SERPL-CCNC: 49 U/L
ALT SERPL-CCNC: 36 U/L
ANION GAP SERPL CALC-SCNC: 6 MMOL/L (ref 0–18)
AST SERPL-CCNC: 25 U/L (ref 15–37)
BASOPHILS # BLD AUTO: 0.03 X10(3) UL (ref 0–0.2)
BASOPHILS NFR BLD AUTO: 0.5 %
BILIRUB SERPL-MCNC: 0.6 MG/DL (ref 0.1–2)
BUN BLD-MCNC: 16 MG/DL (ref 7–18)
CALCIUM BLD-MCNC: 9.8 MG/DL (ref 8.5–10.1)
CHLORIDE SERPL-SCNC: 103 MMOL/L (ref 98–112)
CHOLEST SERPL-MCNC: 124 MG/DL (ref ?–200)
CO2 SERPL-SCNC: 27 MMOL/L (ref 21–32)
COMPLEXED PSA SERPL-MCNC: 1.95 NG/ML (ref ?–4)
CREAT BLD-MCNC: 1.23 MG/DL
DEPRECATED HBV CORE AB SER IA-ACNC: 77 NG/ML
EOSINOPHIL # BLD AUTO: 0.14 X10(3) UL (ref 0–0.7)
EOSINOPHIL NFR BLD AUTO: 2.3 %
ERYTHROCYTE [DISTWIDTH] IN BLOOD BY AUTOMATED COUNT: 13.1 %
EST. AVERAGE GLUCOSE BLD GHB EST-MCNC: 128 MG/DL (ref 68–126)
FASTING PATIENT LIPID ANSWER: YES
FASTING STATUS PATIENT QL REPORTED: YES
GFR SERPLBLD BASED ON 1.73 SQ M-ARVRAT: 63 ML/MIN/1.73M2 (ref 60–?)
GLOBULIN PLAS-MCNC: 4.1 G/DL (ref 2.8–4.4)
GLUCOSE BLD-MCNC: 105 MG/DL (ref 70–99)
HBA1C MFR BLD: 6.1 % (ref ?–5.7)
HCT VFR BLD AUTO: 40.7 %
HDLC SERPL-MCNC: 52 MG/DL (ref 40–59)
HGB BLD-MCNC: 14.1 G/DL
IMM GRANULOCYTES # BLD AUTO: 0.02 X10(3) UL (ref 0–1)
IMM GRANULOCYTES NFR BLD: 0.3 %
IRON SATN MFR SERPL: 29 %
IRON SERPL-MCNC: 109 UG/DL
LDLC SERPL CALC-MCNC: 51 MG/DL (ref ?–100)
LYMPHOCYTES # BLD AUTO: 2.06 X10(3) UL (ref 1–4)
LYMPHOCYTES NFR BLD AUTO: 34.5 %
MCH RBC QN AUTO: 33 PG (ref 26–34)
MCHC RBC AUTO-ENTMCNC: 34.6 G/DL (ref 31–37)
MCV RBC AUTO: 95.3 FL
MONOCYTES # BLD AUTO: 0.59 X10(3) UL (ref 0.1–1)
MONOCYTES NFR BLD AUTO: 9.9 %
NEUTROPHILS # BLD AUTO: 3.13 X10 (3) UL (ref 1.5–7.7)
NEUTROPHILS # BLD AUTO: 3.13 X10(3) UL (ref 1.5–7.7)
NEUTROPHILS NFR BLD AUTO: 52.5 %
NONHDLC SERPL-MCNC: 72 MG/DL (ref ?–130)
OSMOLALITY SERPL CALC.SUM OF ELEC: 284 MOSM/KG (ref 275–295)
PLATELET # BLD AUTO: 161 10(3)UL (ref 150–450)
POTASSIUM SERPL-SCNC: 3.5 MMOL/L (ref 3.5–5.1)
PROT SERPL-MCNC: 7.8 G/DL (ref 6.4–8.2)
RBC # BLD AUTO: 4.27 X10(6)UL
SODIUM SERPL-SCNC: 136 MMOL/L (ref 136–145)
T4 FREE SERPL-MCNC: 0.9 NG/DL (ref 0.8–1.7)
TIBC SERPL-MCNC: 373 UG/DL (ref 240–450)
TRANSFERRIN SERPL-MCNC: 250 MG/DL (ref 200–360)
TRIGL SERPL-MCNC: 121 MG/DL (ref 30–149)
TSI SER-ACNC: 2.33 MIU/ML (ref 0.36–3.74)
VIT B12 SERPL-MCNC: 859 PG/ML (ref 193–986)
VIT D+METAB SERPL-MCNC: 43.7 NG/ML (ref 30–100)
VLDLC SERPL CALC-MCNC: 17 MG/DL (ref 0–30)
WBC # BLD AUTO: 6 X10(3) UL (ref 4–11)

## 2023-06-14 PROCEDURE — 36415 COLL VENOUS BLD VENIPUNCTURE: CPT

## 2023-06-14 PROCEDURE — 83550 IRON BINDING TEST: CPT

## 2023-06-14 PROCEDURE — 84443 ASSAY THYROID STIM HORMONE: CPT

## 2023-06-14 PROCEDURE — 83036 HEMOGLOBIN GLYCOSYLATED A1C: CPT

## 2023-06-14 PROCEDURE — 83540 ASSAY OF IRON: CPT

## 2023-06-14 PROCEDURE — 84439 ASSAY OF FREE THYROXINE: CPT

## 2023-06-14 PROCEDURE — 80053 COMPREHEN METABOLIC PANEL: CPT

## 2023-06-14 PROCEDURE — 85025 COMPLETE CBC W/AUTO DIFF WBC: CPT

## 2023-06-14 PROCEDURE — 82728 ASSAY OF FERRITIN: CPT

## 2023-06-14 PROCEDURE — 82306 VITAMIN D 25 HYDROXY: CPT

## 2023-06-14 PROCEDURE — 80061 LIPID PANEL: CPT

## 2023-06-14 PROCEDURE — 82607 VITAMIN B-12: CPT

## 2023-06-16 DIAGNOSIS — R73.09 ELEVATED HEMOGLOBIN A1C: Primary | ICD-10-CM

## 2023-06-17 ENCOUNTER — PATIENT MESSAGE (OUTPATIENT)
Dept: FAMILY MEDICINE CLINIC | Facility: CLINIC | Age: 73
End: 2023-06-17

## 2023-06-19 NOTE — TELEPHONE ENCOUNTER
From: Melo Araujo  To: Kayla Cruz DO  Sent: 6/17/2023 9:46 AM CDT  Subject: Daja    Just wondering about the results from taking that poop in a bucket tests.

## 2023-09-11 ENCOUNTER — LAB ENCOUNTER (OUTPATIENT)
Dept: LAB | Age: 73
End: 2023-09-11
Attending: FAMILY MEDICINE
Payer: MEDICARE

## 2023-09-11 DIAGNOSIS — R73.09 ELEVATED HEMOGLOBIN A1C: ICD-10-CM

## 2023-09-11 LAB
EST. AVERAGE GLUCOSE BLD GHB EST-MCNC: 134 MG/DL (ref 68–126)
HBA1C MFR BLD: 6.3 % (ref ?–5.7)

## 2023-09-11 PROCEDURE — 83036 HEMOGLOBIN GLYCOSYLATED A1C: CPT

## 2023-09-11 PROCEDURE — 36415 COLL VENOUS BLD VENIPUNCTURE: CPT

## 2023-10-11 ENCOUNTER — OFFICE VISIT (OUTPATIENT)
Facility: LOCATION | Age: 73
End: 2023-10-11
Payer: COMMERCIAL

## 2023-10-11 DIAGNOSIS — L02.31 ABSCESS OF RIGHT BUTTOCK: Primary | ICD-10-CM

## 2023-10-11 PROCEDURE — 1160F RVW MEDS BY RX/DR IN RCRD: CPT | Performed by: SURGERY

## 2023-10-11 PROCEDURE — 99213 OFFICE O/P EST LOW 20 MIN: CPT | Performed by: SURGERY

## 2023-10-11 PROCEDURE — 1159F MED LIST DOCD IN RCRD: CPT | Performed by: SURGERY

## 2023-10-11 RX ORDER — SULFAMETHOXAZOLE AND TRIMETHOPRIM 800; 160 MG/1; MG/1
1 TABLET ORAL 2 TIMES DAILY
Qty: 14 TABLET | Refills: 0 | Status: SHIPPED | OUTPATIENT
Start: 2023-10-11 | End: 2023-10-18

## 2023-10-11 NOTE — PROGRESS NOTES
Follow Up Visit Note       Active Problems      1. Abscess of right buttock          Chief Complaint   Patient presents with:  Establish Care: Est - Sebaceous Cyst - Pt states cyst drained 9/5 and 28        History of Present Illness  The patient is a 79-year-old gentleman who presents with a new inflamed sebaceous cyst on his right buttock. The patient was seen in April with an inflamed right buttock sebaceous cyst that since quieted down. A few weeks ago, he had pain and swelling near the original site. It drained purulent material a few days ago. Now, overall, he feels better. He has noticed no further drainage. Allergies  [facility] is allergic to cephalosporins and keflex. Past Medical / Surgical / Social / Family History    The past medical and past surgical history have been reviewed by me today. Past Medical History:   Diagnosis Date    Chronic obstructive pulmonary disease with acute exacerbation (HCC) 3/23/2018    CPAP (continuous positive airway pressure) dependence     Hereditary hemochromatosis ([facility] [address] 01/2009    Influenza A (H1N1) 1/7/2014    Kidney calculi     Meralgia paresthetica 9/11/2013    Plantar fasciitis 7/18/2013    Trigger finger 6/3/2014     Past Surgical History:   Procedure Laterality Date    ANESTH,SURGERY OF SHOULDER  1/1/04    lump left    YEFRI LOCALIZATION WIRE 1 SITE LEFT (CPT=19281)  10/2020    Abscess formation, chronic inflammation    YEFRI LOCALIZATION WIRE 1 SITE RIGHT (CPT=19281)  2000    NEEDLE BIOPSY LEFT  10/2020    Inflammatory exudate    NEEDLE BIOPSY LEFT  10/2020    LN    OTHER SURGICAL HISTORY  9/1/70    testis    OTHER SURGICAL HISTORY  10/30/2020    I&D BREAST ABSCESS W/ [place]     OTHER SURGICAL HISTORY  09/03/2021    groin cyst - [facility]        The family history and social history have been reviewed by me today.     Family History   Problem Relation Age of Onset    Other (heart failure) Father     Other (emphysema) Father     Diabetes Mother      Social History Socioeconomic History      Marital status:     Tobacco Use      Smoking status: Every Day        Packs/day: 1        Types: Cigarettes      Smokeless tobacco: Never    Vaping Use      Vaping Use: Never used    Substance and Sexual Activity      Alcohol use: No        Alcohol/week: 0.0 standard drinks of alcohol        Comment: social       Drug use: No    Other Topics      Concerns:        Caffeine Concern: Yes        Exercise: No       Current Outpatient Medications:     sulfamethoxazole-trimethoprim DS (BACTRIM DS) 800-160 MG Oral Tab per tablet, Take 1 tablet by mouth 2 (two) times daily for 7 days. , Disp: 14 tablet, Rfl: 0    VENTOLIN  (90 Base) MCG/ACT Inhalation Aero Soln, USE 2 INHALATIONS BY MOUTH INTO  THE LUNGS EVERY 4 HOURS AS  NEEDED FOR WHEEZING, Disp: 108 g, Rfl: 3    SIMVASTATIN 40 MG Oral Tab, TAKE 1 TABLET BY MOUTH AT NIGHT, Disp: 90 tablet, Rfl: 3    LOSARTAN-HYDROCHLOROTHIAZIDE 100-25 MG Oral Tab, TAKE 1 TABLET BY MOUTH DAILY, Disp: 90 tablet, Rfl: 3    folic acid 258 MCG Oral Tab, Take 1 tablet (800 mcg total) by mouth daily. , Disp: , Rfl:     acetaminophen-codeine 300-30 MG Oral Tab, , Disp: , Rfl:     bacitracin 500 UNIT/GM External Ointment, Apply topically as needed. , Disp: , Rfl:     mupirocin 2 % External Ointment, Apply to affected area three times daily for 7 days, Disp: 1 g, Rfl: 0    Homeopathic Products (ARNICA) External Gel, Apply topically as needed. , Disp: , Rfl:     Lutein 20 MG Oral Cap, Take 20 mg by mouth daily. , Disp: , Rfl:     Vitamin D3, Cholecalciferol, 50 MCG (2000 UT) Oral Tab, Take by mouth.  , Disp: , Rfl:     Pyridoxine HCl (VITAMIN B-6) 100 MG Oral Tab, Take 10 tablets (1,000 mg total) by mouth twice a week., Disp: , Rfl:     Polyethyl Glycol-Propyl Glycol 0.4-0.3 % Ophthalmic Gel, Apply to eye.  , Disp: , Rfl:     Aspirin (ASPIR-81 OR), Take 81 mg by mouth daily. , Disp: , Rfl:     Coenzyme Q10 (COQ-10) 200 MG Oral Cap, Take by mouth daily.   , Disp: , Rfl:      Review of Systems  The Review of Systems has been reviewed by me during today. Review of Systems   Constitutional:  Negative for chills, diaphoresis, fatigue, fever and unexpected weight change. HENT:  Negative for hearing loss, nosebleeds, sore throat and trouble swallowing. Respiratory:  Negative for apnea, cough, shortness of breath and wheezing. Cardiovascular:  Negative for chest pain, palpitations and leg swelling. Gastrointestinal:  Negative for abdominal distention, abdominal pain, anal bleeding, blood in stool, constipation, diarrhea, nausea and vomiting. Genitourinary:  Negative for difficulty urinating, dysuria, frequency and urgency. Musculoskeletal:  Negative for arthralgias and myalgias. Skin:  Negative for color change and rash. Neurological:  Negative for tremors, syncope and weakness. Hematological:  Negative for adenopathy. Does not bruise/bleed easily. Psychiatric/Behavioral:  Negative for behavioral problems and sleep disturbance. Physical Findings   There were no vitals taken for this visit. Physical Exam  Constitutional:       Appearance: He is well-developed. HENT:      Head: Normocephalic and atraumatic. Eyes:      General: No scleral icterus. Neck:      Trachea: No tracheal deviation. Genitourinary:      Skin:     General: Skin is warm and dry. Neurological:      Mental Status: He is alert and oriented to person, place, and time. Psychiatric:         Speech: Speech normal.         Behavior: Behavior normal. Behavior is cooperative. Thought Content: Thought content normal.         Judgment: Judgment normal.          Assessment   Abscess of right buttock  (primary encounter diagnosis)    Plan   The patient has a healing right buttock sebaceous cyst and a new site. I recommended he take a 1 week course of antibiotics. A prescription for Bactrim was sent to his pharmacy.   I asked him to follow-up in 2 to 3 weeks for repeat wound evaluation.       Ney Triplett MD

## 2023-10-30 ENCOUNTER — OFFICE VISIT (OUTPATIENT)
Facility: LOCATION | Age: 73
End: 2023-10-30

## 2023-10-30 VITALS — TEMPERATURE: 98 F | HEART RATE: 95 BPM

## 2023-10-30 DIAGNOSIS — L02.31 ABSCESS OF RIGHT BUTTOCK: Primary | ICD-10-CM

## 2023-10-30 NOTE — PROGRESS NOTES
Subjective:   Patient ID: Valerie Euceda is a 72year old male who follows up regarding the inflamed sebaceous cyst of his right buttock. He states he no longer causes pain. It has no drainage. He denies fevers and chills. HPI    History/Other:   Review of Systems  Current Outpatient Medications   Medication Sig Dispense Refill    VENTOLIN  (90 Base) MCG/ACT Inhalation Aero Soln USE 2 INHALATIONS BY MOUTH INTO  THE LUNGS EVERY 4 HOURS AS  NEEDED FOR WHEEZING 108 g 3    SIMVASTATIN 40 MG Oral Tab TAKE 1 TABLET BY MOUTH AT NIGHT 90 tablet 3    LOSARTAN-HYDROCHLOROTHIAZIDE 100-25 MG Oral Tab TAKE 1 TABLET BY MOUTH DAILY 90 tablet 3    folic acid 866 MCG Oral Tab Take 1 tablet (800 mcg total) by mouth daily. acetaminophen-codeine 300-30 MG Oral Tab       bacitracin 500 UNIT/GM External Ointment Apply topically as needed. mupirocin 2 % External Ointment Apply to affected area three times daily for 7 days 1 g 0    Homeopathic Products (ARNICA) External Gel Apply topically as needed. Lutein 20 MG Oral Cap Take 20 mg by mouth daily. Vitamin D3, Cholecalciferol, 50 MCG (2000 UT) Oral Tab Take by mouth. Pyridoxine HCl (VITAMIN B-6) 100 MG Oral Tab Take 10 tablets (1,000 mg total) by mouth twice a week. Polyethyl Glycol-Propyl Glycol 0.4-0.3 % Ophthalmic Gel Apply to eye. Aspirin (ASPIR-81 OR) Take 81 mg by mouth daily. Coenzyme Q10 (COQ-10) 200 MG Oral Cap Take by mouth daily. Allergies:  Cephalosporins            Keflex                  RASH    Comment:CAPS    Objective:   Physical Exam  Constitutional:       Appearance: He is well-developed. HENT:      Head: Normocephalic and atraumatic. Eyes:      General: No scleral icterus. Neck:      Trachea: No tracheal deviation. Genitourinary:      Skin:     General: Skin is warm and dry. Neurological:      Mental Status: He is alert and oriented to person, place, and time.    Psychiatric: Speech: Speech normal.         Behavior: Behavior normal. Behavior is cooperative. Thought Content: Thought content normal.         Judgment: Judgment normal.         Assessment & Plan:   Abscess of right buttock  (primary encounter diagnosis)    The area has healed. We briefly discussed excision, but the patient declined. He will follow-up with me as needed.     Jinger Libman, MD

## 2023-11-13 ENCOUNTER — PATIENT MESSAGE (OUTPATIENT)
Dept: FAMILY MEDICINE CLINIC | Facility: CLINIC | Age: 73
End: 2023-11-13

## 2023-11-14 NOTE — TELEPHONE ENCOUNTER
From: Ashley Araujo  To: Eliezer Head  Sent: 11/13/2023 9:07 PM CST  Subject: Elvi Scale shot    2nd request to please update my medical records to reflect that I received both the Flu Shot & the [place] Covid-19 shot both on November 08, 2023

## 2023-11-16 ENCOUNTER — APPOINTMENT (OUTPATIENT)
Dept: HEMATOLOGY/ONCOLOGY | Facility: HOSPITAL | Age: 73
End: 2023-11-16
Attending: INTERNAL MEDICINE
Payer: MEDICARE

## 2023-11-21 ENCOUNTER — APPOINTMENT (OUTPATIENT)
Dept: HEMATOLOGY/ONCOLOGY | Facility: HOSPITAL | Age: 73
End: 2023-11-21
Attending: INTERNAL MEDICINE
Payer: MEDICARE

## 2023-12-07 ENCOUNTER — OFFICE VISIT (OUTPATIENT)
Dept: HEMATOLOGY/ONCOLOGY | Facility: HOSPITAL | Age: 73
End: 2023-12-07
Attending: INTERNAL MEDICINE
Payer: MEDICARE

## 2023-12-07 VITALS
TEMPERATURE: 98 F | DIASTOLIC BLOOD PRESSURE: 69 MMHG | BODY MASS INDEX: 38 KG/M2 | SYSTOLIC BLOOD PRESSURE: 104 MMHG | OXYGEN SATURATION: 97 % | RESPIRATION RATE: 16 BRPM | WEIGHT: 259.81 LBS | HEART RATE: 96 BPM

## 2023-12-07 DIAGNOSIS — E83.110 HEMOCHROMATOSIS, HEREDITARY (HCC): Primary | ICD-10-CM

## 2023-12-07 DIAGNOSIS — R73.09 ELEVATED HEMOGLOBIN A1C: ICD-10-CM

## 2023-12-07 LAB
ALBUMIN SERPL-MCNC: 3.8 G/DL (ref 3.4–5)
ALBUMIN/GLOB SERPL: 0.9 {RATIO} (ref 1–2)
ALP LIVER SERPL-CCNC: 49 U/L
ALT SERPL-CCNC: 29 U/L
ANION GAP SERPL CALC-SCNC: 5 MMOL/L (ref 0–18)
AST SERPL-CCNC: 17 U/L (ref 15–37)
BASOPHILS # BLD AUTO: 0.03 X10(3) UL (ref 0–0.2)
BASOPHILS NFR BLD AUTO: 0.4 %
BILIRUB SERPL-MCNC: 0.5 MG/DL (ref 0.1–2)
BUN BLD-MCNC: 20 MG/DL (ref 9–23)
CALCIUM BLD-MCNC: 9.3 MG/DL (ref 8.5–10.1)
CHLORIDE SERPL-SCNC: 104 MMOL/L (ref 98–112)
CO2 SERPL-SCNC: 26 MMOL/L (ref 21–32)
CREAT BLD-MCNC: 1.25 MG/DL
DEPRECATED HBV CORE AB SER IA-ACNC: 149.9 NG/ML
EGFRCR SERPLBLD CKD-EPI 2021: 62 ML/MIN/1.73M2 (ref 60–?)
EOSINOPHIL # BLD AUTO: 0.22 X10(3) UL (ref 0–0.7)
EOSINOPHIL NFR BLD AUTO: 3.2 %
ERYTHROCYTE [DISTWIDTH] IN BLOOD BY AUTOMATED COUNT: 13.1 %
EST. AVERAGE GLUCOSE BLD GHB EST-MCNC: 128 MG/DL (ref 68–126)
FASTING STATUS PATIENT QL REPORTED: NO
GLOBULIN PLAS-MCNC: 4.3 G/DL (ref 2.8–4.4)
GLUCOSE BLD-MCNC: 111 MG/DL (ref 70–99)
HBA1C MFR BLD: 6.1 % (ref ?–5.7)
HCT VFR BLD AUTO: 40.6 %
HGB BLD-MCNC: 14.2 G/DL
IMM GRANULOCYTES # BLD AUTO: 0.02 X10(3) UL (ref 0–1)
IMM GRANULOCYTES NFR BLD: 0.3 %
IRON SATN MFR SERPL: 31 %
IRON SERPL-MCNC: 116 UG/DL
LYMPHOCYTES # BLD AUTO: 1.91 X10(3) UL (ref 1–4)
LYMPHOCYTES NFR BLD AUTO: 27.6 %
MCH RBC QN AUTO: 32.2 PG (ref 26–34)
MCHC RBC AUTO-ENTMCNC: 35 G/DL (ref 31–37)
MCV RBC AUTO: 92.1 FL
MONOCYTES # BLD AUTO: 0.75 X10(3) UL (ref 0.1–1)
MONOCYTES NFR BLD AUTO: 10.8 %
NEUTROPHILS # BLD AUTO: 4 X10 (3) UL (ref 1.5–7.7)
NEUTROPHILS # BLD AUTO: 4 X10(3) UL (ref 1.5–7.7)
NEUTROPHILS NFR BLD AUTO: 57.7 %
OSMOLALITY SERPL CALC.SUM OF ELEC: 283 MOSM/KG (ref 275–295)
PLATELET # BLD AUTO: 143 10(3)UL (ref 150–450)
POTASSIUM SERPL-SCNC: 3.4 MMOL/L (ref 3.5–5.1)
PROT SERPL-MCNC: 8.1 G/DL (ref 6.4–8.2)
RBC # BLD AUTO: 4.41 X10(6)UL
SODIUM SERPL-SCNC: 135 MMOL/L (ref 136–145)
TIBC SERPL-MCNC: 380 UG/DL (ref 240–450)
TRANSFERRIN SERPL-MCNC: 255 MG/DL (ref 200–360)
WBC # BLD AUTO: 6.9 X10(3) UL (ref 4–11)

## 2023-12-07 PROCEDURE — 99214 OFFICE O/P EST MOD 30 MIN: CPT | Performed by: INTERNAL MEDICINE

## 2023-12-07 RX ORDER — CALCIUM CARBONATE-CHOLECALCIFEROL TAB 250 MG-125 UNIT 250-125 MG-UNIT
1 TAB ORAL DAILY
COMMUNITY

## 2023-12-07 NOTE — PROGRESS NOTES
Here for follow up. Donates blood at [address] [place] [address] blood centers every 3 months (usually one pint but lately it has been less). He has some trouble scheduling there, but he says it is more convenient than doing phlebotomies here in the afternoons. Received all of his vaccines this fall.

## 2024-02-03 DIAGNOSIS — E78.00 PURE HYPERCHOLESTEROLEMIA: ICD-10-CM

## 2024-02-05 RX ORDER — LOSARTAN POTASSIUM AND HYDROCHLOROTHIAZIDE 25; 100 MG/1; MG/1
1 TABLET ORAL DAILY
Qty: 90 TABLET | Refills: 0 | Status: SHIPPED | OUTPATIENT
Start: 2024-02-05

## 2024-02-05 RX ORDER — SIMVASTATIN 40 MG
40 TABLET ORAL NIGHTLY
Qty: 90 TABLET | Refills: 0 | Status: SHIPPED | OUTPATIENT
Start: 2024-02-05

## 2024-02-05 NOTE — TELEPHONE ENCOUNTER
A refill request was received for:  Requested Prescriptions     Pending Prescriptions Disp Refills    simvastatin 40 MG Oral Tab 90 tablet 3     Sig: Take 1 tablet (40 mg total) by mouth nightly.    losartan-hydroCHLOROthiazide 100-25 MG Oral Tab 90 tablet 3     Sig: Take 1 tablet by mouth daily.       Last refill date:01/13/23 for both medications       Last office visit: 05/12/23      Future Appointments   Date Time Provider Department Center   2/13/2024  9:15 AM Curtis Gutierrez DPM [id number]   12/5/2024 10:30 AM Brandyn Henley MD  [facility]

## 2024-02-13 ENCOUNTER — OFFICE VISIT (OUTPATIENT)
Dept: PODIATRY CLINIC | Facility: CLINIC | Age: 74
End: 2024-02-13
Payer: COMMERCIAL

## 2024-02-13 DIAGNOSIS — L60.0 ONYCHOCRYPTOSIS: Primary | ICD-10-CM

## 2024-02-13 DIAGNOSIS — B35.1 ONYCHOMYCOSIS: ICD-10-CM

## 2024-02-13 DIAGNOSIS — N18.31 STAGE 3A CHRONIC KIDNEY DISEASE (HCC): Chronic | ICD-10-CM

## 2024-02-13 PROCEDURE — 1159F MED LIST DOCD IN RCRD: CPT | Performed by: PODIATRIST

## 2024-02-13 PROCEDURE — 1126F AMNT PAIN NOTED NONE PRSNT: CPT | Performed by: PODIATRIST

## 2024-02-13 PROCEDURE — 99203 OFFICE O/P NEW LOW 30 MIN: CPT | Performed by: PODIATRIST

## 2024-02-13 NOTE — PROGRESS NOTES
Priyank Araujo is a 73 year old male.   Chief Complaint   Patient presents with    Toenail Care     Nail care and foot check- pre diabetic- patient denies pain- left foot - feel like there is something in the ball of the foot          HPI:   The gentleman presents to the clinic for nail care and diabetic foot check as he is now been diagnosed prediabetic.  He is not having any type of pain.  At today's visit reviewed nurse's history as taken above, allergies medications and medical history as documented below.  All changes duly noted  Allergies: Cephalosporins and Keflex   Current Outpatient Medications   Medication Sig Dispense Refill    simvastatin 40 MG Oral Tab Take 1 tablet (40 mg total) by mouth nightly. 90 tablet 0    losartan-hydroCHLOROthiazide 100-25 MG Oral Tab Take 1 tablet by mouth daily. 90 tablet 0    calcium carbonate-vitamin D 250-3.125 MG-MCG Oral Tab Take 1 tablet by mouth daily.      VENTOLIN  (90 Base) MCG/ACT Inhalation Aero Soln USE 2 INHALATIONS BY MOUTH INTO  THE LUNGS EVERY 4 HOURS AS  NEEDED FOR WHEEZING 108 g 3    folic acid 800 MCG Oral Tab Take 1 tablet (800 mcg total) by mouth daily.      acetaminophen-codeine 300-30 MG Oral Tab       bacitracin 500 UNIT/GM External Ointment Apply topically as needed.      mupirocin 2 % External Ointment Apply to affected area three times daily for 7 days 1 g 0    Homeopathic Products (ARNICA) External Gel Apply topically as needed.        Lutein 20 MG Oral Cap Take 20 mg by mouth daily.      Vitamin D3, Cholecalciferol, 50 MCG (2000 UT) Oral Tab Take by mouth.        Pyridoxine HCl (VITAMIN B-6) 100 MG Oral Tab Take 10 tablets (1,000 mg total) by mouth twice a week.      Polyethyl Glycol-Propyl Glycol 0.4-0.3 % Ophthalmic Gel Apply to eye.        Aspirin (ASPIR-81 OR) Take 81 mg by mouth daily.      Coenzyme Q10 (COQ-10) 200 MG Oral Cap Take 100 mg by mouth daily.        Past Medical History:   Diagnosis Date    Chronic obstructive  pulmonary disease with acute exacerbation (HCC) 3/23/2018    CPAP (continuous positive airway pressure) dependence     Hereditary hemochromatosis (HCC) 01/2009    Influenza A (H1N1) 1/7/2014    Kidney calculi     Meralgia paresthetica 9/11/2013    Plantar fasciitis 7/18/2013    Trigger finger 6/3/2014      Past Surgical History:   Procedure Laterality Date    ANESTH,SURGERY OF SHOULDER  1/1/04    lump left    YEFRI LOCALIZATION WIRE 1 SITE LEFT (CPT=19281)  10/2020    Abscess formation, chronic inflammation    YEFRI LOCALIZATION WIRE 1 SITE RIGHT (CPT=19281)  2000    NEEDLE BIOPSY LEFT  10/2020    Inflammatory exudate    NEEDLE BIOPSY LEFT  10/2020    LN    OTHER SURGICAL HISTORY  9/1/70    testis    OTHER SURGICAL HISTORY  10/30/2020    I&D BREAST ABSCESS W/ [facility]     OTHER SURGICAL HISTORY  09/03/2021    groin cyst - [facility]       Family History   Problem Relation Age of Onset    Other (heart failure) Father     Other (emphysema) Father     Diabetes Mother       Social History     Socioeconomic History    Marital status:    Tobacco Use    Smoking status: Every Day     Packs/day: 1     Types: Cigarettes    Smokeless tobacco: Never   Vaping Use    Vaping Use: Never used   Substance and Sexual Activity    Alcohol use: No     Alcohol/week: 0.0 standard drinks of alcohol     Comment: social     Drug use: No   Other Topics Concern    Caffeine Concern Yes    Exercise No           REVIEW OF SYSTEMS:   Today reviewed systens as documented below  GENERAL HEALTH: feels well otherwise  SKIN: Refer to exam below  RESPIRATORY: denies shortness of breath with exertion  CARDIOVASCULAR: denies chest pain on exertion  GI: denies abdominal pain and denies heartburn  NEURO: denies headaches    EXAM:   There were no vitals taken for this visit.  GENERAL: well developed, well nourished, in no apparent distress  EXTREMITIES:   1. Integument: The skin on his feet is warm and dry.  His nails 1-5 have relatively normal thickness with the  exception of the lateral aspect only of the right hallux nail which is thicker.  The medial lateral nail borders of the hallux are incurvated and have some degree of hyperkeratotic impaction there is no sign of infection present no tenderness on palpation.   2. Vascular: Has palpable pulses dorsalis pedis and posterior tibial   3. Neurologic: Patient can feel the [place] 10 g filament in all dermatomes   4. Musculoskeletal: Has excellent muscle strength all groups affecting the foot and ankle fired 5 out of 5 against resistance.    ASSESSMENT AND PLAN:   Diagnoses and all orders for this visit:    Onychocryptosis    Onychomycosis    Stage 3a chronic kidney disease (HCC)        Plan: Today using a nail nippers were trimmed and debrided toenails 1-5 manually and mechanically in girth and width as far down to healthy tissue as possible on both feet.  This was done uneventfully there was no hemorrhage.  There is mild incurvation of the medial and lateral nail borders of the hallux which using a slant back technique were removed and they would not get ingrown.   At today's visit I reminded the patient about daily foot checks  Reminded patient again of proper control of his diabetes to help prevent any severe complications in his feet  Proper foot hygiene moisturizing except between the toes was reviewed  Advised follow-up again every 2 to 3 months for routine care but emphasized to him the importance of coming in sooner if he notices any problems.  During that he has prediabetes as well as stage IIIa chronic kidney disease.    The patient indicates understanding of these issues and agrees to the plan.    Curtis Gutierrez DPM

## 2024-02-16 ENCOUNTER — TELEPHONE (OUTPATIENT)
Dept: HEMATOLOGY/ONCOLOGY | Facility: HOSPITAL | Age: 74
End: 2024-02-16

## 2024-02-16 NOTE — TELEPHONE ENCOUNTER
Therapeutic Phlebotomy orders can no longer be paper, per the fax received by [employer].     I filled out an order form online on the [facility] website and sent it over.

## 2024-03-18 ENCOUNTER — OFFICE VISIT (OUTPATIENT)
Facility: LOCATION | Age: 74
End: 2024-03-18
Payer: COMMERCIAL

## 2024-03-18 VITALS — TEMPERATURE: 97 F | HEART RATE: 89 BPM | OXYGEN SATURATION: 96 %

## 2024-03-18 DIAGNOSIS — L02.31 GLUTEAL ABSCESS: Primary | ICD-10-CM

## 2024-03-18 PROCEDURE — 99212 OFFICE O/P EST SF 10 MIN: CPT | Performed by: STUDENT IN AN ORGANIZED HEALTH CARE EDUCATION/TRAINING PROGRAM

## 2024-03-18 PROCEDURE — 1159F MED LIST DOCD IN RCRD: CPT | Performed by: STUDENT IN AN ORGANIZED HEALTH CARE EDUCATION/TRAINING PROGRAM

## 2024-03-18 PROCEDURE — 1160F RVW MEDS BY RX/DR IN RCRD: CPT | Performed by: STUDENT IN AN ORGANIZED HEALTH CARE EDUCATION/TRAINING PROGRAM

## 2024-03-18 NOTE — H&P
Patient ID: Priyank Araujo is a 73 year old male.    Chief Complaint   Patient presents with    New Patient     NP - new R buttock abscess, mild/mod pain       HPI: Priyank Araujo is a 73 year old male presents for evaluation.  Patient was previously seen by Dr. Glover for multiple sebaceous cyst.  He comes today with pain and drainage from his right gluteus.  Patient thinks he has another infected sebaceous cyst.  He states approximately 3 weeks ago the area got very large and swollen and painful.  Since then, he has had a lot of drainage.  The area has gotten significantly smaller.  He is not currently not on any antibiotics.  He denies any other symptoms including fevers and chills.    Workup: None      Past Medical History  Past Medical History:   Diagnosis Date    Chronic obstructive pulmonary disease with acute exacerbation (HCC) 3/23/2018    CPAP (continuous positive airway pressure) dependence     Hereditary hemochromatosis (HCC) 01/2009    Influenza A (H1N1) 1/7/2014    Kidney calculi     Meralgia paresthetica 9/11/2013    Plantar fasciitis 7/18/2013    Trigger finger 6/3/2014       Past Surgical History  Past Surgical History:   Procedure Laterality Date    ANESTH,SURGERY OF SHOULDER  1/1/04    lump left    YEFRI LOCALIZATION WIRE 1 SITE LEFT (CPT=19281)  10/2020    Abscess formation, chronic inflammation    YEFRI LOCALIZATION WIRE 1 SITE RIGHT (CPT=19281)  2000    NEEDLE BIOPSY LEFT  10/2020    Inflammatory exudate    NEEDLE BIOPSY LEFT  10/2020    LN    OTHER SURGICAL HISTORY  9/1/70    testis    OTHER SURGICAL HISTORY  10/30/2020    I&D BREAST ABSCESS W/ ESH     OTHER SURGICAL HISTORY  09/03/2021    groin cyst - ESH        Medications  Current Outpatient Medications   Medication Sig Dispense Refill    simvastatin 40 MG Oral Tab Take 1 tablet (40 mg total) by mouth nightly. 90 tablet 0    losartan-hydroCHLOROthiazide 100-25 MG Oral Tab Take 1 tablet by mouth daily. 90 tablet 0     calcium carbonate-vitamin D 250-3.125 MG-MCG Oral Tab Take 1 tablet by mouth daily.      VENTOLIN  (90 Base) MCG/ACT Inhalation Aero Soln USE 2 INHALATIONS BY MOUTH INTO  THE LUNGS EVERY 4 HOURS AS  NEEDED FOR WHEEZING 108 g 3    folic acid 800 MCG Oral Tab Take 1 tablet (800 mcg total) by mouth daily.      acetaminophen-codeine 300-30 MG Oral Tab       bacitracin 500 UNIT/GM External Ointment Apply topically as needed.      mupirocin 2 % External Ointment Apply to affected area three times daily for 7 days 1 g 0    Homeopathic Products (ARNICA) External Gel Apply topically as needed.        Lutein 20 MG Oral Cap Take 20 mg by mouth daily.      Vitamin D3, Cholecalciferol, 50 MCG (2000 UT) Oral Tab Take by mouth.        Pyridoxine HCl (VITAMIN B-6) 100 MG Oral Tab Take 10 tablets (1,000 mg total) by mouth twice a week.      Polyethyl Glycol-Propyl Glycol 0.4-0.3 % Ophthalmic Gel Apply to eye.        Aspirin (ASPIR-81 OR) Take 81 mg by mouth daily.      Coenzyme Q10 (COQ-10) 200 MG Oral Cap Take 100 mg by mouth daily.         Allergies  Allergies   Allergen Reactions    Cephalosporins     Keflex RASH     CAPS       Social History  History   Smoking Status    Every Day    Packs/day: 1.00    Types: Cigarettes   Smokeless Tobacco    Never     History   Alcohol Use No     Comment: social      History   Drug Use No       Family History  Family History   Problem Relation Age of Onset    Other (heart failure) Father     Other (emphysema) Father     Diabetes Mother        Review of Systems  Review of Systems   Constitutional: Negative.    Respiratory: Negative.     Cardiovascular: Negative.    Gastrointestinal: Negative.        Exam  Vitals:    03/18/24 1055   Pulse: 89   Temp: 97.2 °F (36.2 °C)     Physical Exam  Exam conducted with a chaperone present.   Constitutional:       Appearance: Normal appearance.   Cardiovascular:      Rate and Rhythm: Normal rate.   Pulmonary:      Effort: Pulmonary effort is normal.    Genitourinary:      Musculoskeletal:         General: Normal range of motion.   Skin:     General: Skin is warm and dry.   Neurological:      Mental Status: He is alert and oriented to person, place, and time.           Assessment/Plan  Assessment   Problem List Items Addressed This Visit          Infectious Diseases    Gluteal abscess - Primary       Edjourdan Araujo is a 73 year old male with a gluteal abscess    Patient has a long history of multiple sebaceous cysts  On physical exam, patient has an area of induration with some associated drainage with palpation  There was no obvious central punctum consistent with a sebaceous cyst but this might be skewed by his exam  Patient is overall getting better from his abscess  Will continue to monitor off antibiotics  Will have patient follow-up in 2 to 3 weeks for reevaluation  If he has worsening symptoms including pain, swelling, and more drainage, he is call the office as he may need a drainage procedure  If at the next visit, area looks more like an infected sebaceous cyst, can plan for excision under local    Patient can call the office for any questions or concerns      Naz Robles MD  General Surgery  [facility]     CC:  Zita Andujar DO

## 2024-04-05 ENCOUNTER — OFFICE VISIT (OUTPATIENT)
Facility: LOCATION | Age: 74
End: 2024-04-05
Payer: COMMERCIAL

## 2024-04-05 VITALS — TEMPERATURE: 97 F | HEART RATE: 93 BPM

## 2024-04-05 DIAGNOSIS — L72.3 SEBACEOUS CYST: ICD-10-CM

## 2024-04-05 DIAGNOSIS — L72.0 EPIDERMAL CYST OF NECK: Primary | ICD-10-CM

## 2024-04-05 NOTE — PROGRESS NOTES
Patient ID: Priyank Araujo is a 73 year old male.    Chief Complaint   Patient presents with    \[facility]\"" Care     EP- 2 week f/u Abscess of Buttock- pt states the abscess is gone, healing good       HPI: Priyank Araujo is a 73 year old male presents to clinic for follow-up.  Since last clinic appointment, patient reports resolution of his symptoms.  He has noticed a mass on his left cheek which resolved.  He also noticed a mass on his left neck.  He denies any drainage.  He denies any fevers or chills.    Workup: None      Past Medical History  Past Medical History:   Diagnosis Date    Chronic obstructive pulmonary disease with acute exacerbation (HCC) 3/23/2018    CPAP (continuous positive airway pressure) dependence     Hereditary hemochromatosis (HCC) 01/2009    Influenza A (H1N1) 1/7/2014    Kidney calculi     Meralgia paresthetica 9/11/2013    Plantar fasciitis 7/18/2013    Trigger finger 6/3/2014       Past Surgical History  Past Surgical History:   Procedure Laterality Date    ANESTH,SURGERY OF SHOULDER  1/1/04    lump left    YEFRI LOCALIZATION WIRE 1 SITE LEFT (CPT=19281)  10/2020    Abscess formation, chronic inflammation    YEFRI LOCALIZATION WIRE 1 SITE RIGHT (CPT=19281)  2000    NEEDLE BIOPSY LEFT  10/2020    Inflammatory exudate    NEEDLE BIOPSY LEFT  10/2020    LN    OTHER SURGICAL HISTORY  9/1/70    testis    OTHER SURGICAL HISTORY  10/30/2020    I&D BREAST ABSCESS W/ ESH     OTHER SURGICAL HISTORY  09/03/2021    groin cyst - ESH        Medications  Current Outpatient Medications   Medication Sig Dispense Refill    simvastatin 40 MG Oral Tab Take 1 tablet (40 mg total) by mouth nightly. 90 tablet 0    losartan-hydroCHLOROthiazide 100-25 MG Oral Tab Take 1 tablet by mouth daily. 90 tablet 0    calcium carbonate-vitamin D 250-3.125 MG-MCG Oral Tab Take 1 tablet by mouth daily.      VENTOLIN  (90 Base) MCG/ACT Inhalation Aero Soln USE 2 INHALATIONS BY MOUTH INTO  THE LUNGS  EVERY 4 HOURS AS  NEEDED FOR WHEEZING 108 g 3    folic acid 800 MCG Oral Tab Take 1 tablet (800 mcg total) by mouth daily.      acetaminophen-codeine 300-30 MG Oral Tab       bacitracin 500 UNIT/GM External Ointment Apply topically as needed.      mupirocin 2 % External Ointment Apply to affected area three times daily for 7 days 1 g 0    Homeopathic Products (ARNICA) External Gel Apply topically as needed.        Lutein 20 MG Oral Cap Take 20 mg by mouth daily.      Vitamin D3, Cholecalciferol, 50 MCG (2000 UT) Oral Tab Take by mouth.        Pyridoxine HCl (VITAMIN B-6) 100 MG Oral Tab Take 10 tablets (1,000 mg total) by mouth twice a week.      Polyethyl Glycol-Propyl Glycol 0.4-0.3 % Ophthalmic Gel Apply to eye.        Aspirin (ASPIR-81 OR) Take 81 mg by mouth daily.      Coenzyme Q10 (COQ-10) 200 MG Oral Cap Take 100 mg by mouth daily.         Allergies  Allergies   Allergen Reactions    Cephalosporins     Keflex RASH     CAPS       Social History  History   Smoking Status    Every Day    Packs/day: 1.00    Types: Cigarettes   Smokeless Tobacco    Never     History   Alcohol Use No     Comment: social      History   Drug Use No       Family History  Family History   Problem Relation Age of Onset    Other (heart failure) Father     Other (emphysema) Father     Diabetes Mother        Review of Systems  Review of Systems   Constitutional: Negative.    Respiratory: Negative.     Cardiovascular: Negative.    Gastrointestinal: Negative.        Exam  Vitals:    04/05/24 1047   Pulse: 93   Temp: 97.2 °F (36.2 °C)     Physical Exam  Exam conducted with a chaperone present.   Constitutional:       Appearance: Normal appearance.   HENT:      Head:     Cardiovascular:      Rate and Rhythm: Normal rate.   Pulmonary:      Effort: Pulmonary effort is normal.   Genitourinary:      Musculoskeletal:         General: Normal range of motion.   Skin:     General: Skin is warm and dry.   Neurological:      Mental Status: He is alert  and oriented to person, place, and time.           Assessment/Plan  Assessment   Problem List Items Addressed This Visit          Skin    Sebaceous cyst - Primary       Edjourdan Brandyn Araujo is a 73 year old male with multiple sebaceous cyst    Overall, patient is doing well  He is reports resolution of his symptoms and has no complaints  Patient would like any of his cyst removed, we can plan for under local  If he has any recurrent symptoms including pain, drainage, or signs of infection, he should call the office  Patient can follow-up as needed    He can call the office with any questions or concerns    Thank you for letting me participate in care of this patient      Naz Robles MD  General Surgery  [facility]     CC:  Zita Andujar DO

## 2024-04-13 DIAGNOSIS — E78.00 PURE HYPERCHOLESTEROLEMIA: ICD-10-CM

## 2024-04-13 RX ORDER — LOSARTAN POTASSIUM AND HYDROCHLOROTHIAZIDE 25; 100 MG/1; MG/1
1 TABLET ORAL DAILY
Qty: 90 TABLET | Refills: 3 | Status: SHIPPED | OUTPATIENT
Start: 2024-04-13

## 2024-04-13 RX ORDER — SIMVASTATIN 40 MG
40 TABLET ORAL NIGHTLY
Qty: 90 TABLET | Refills: 3 | Status: SHIPPED | OUTPATIENT
Start: 2024-04-13

## 2024-04-23 ENCOUNTER — OFFICE VISIT (OUTPATIENT)
Dept: PODIATRY CLINIC | Facility: CLINIC | Age: 74
End: 2024-04-23
Payer: COMMERCIAL

## 2024-04-23 DIAGNOSIS — L60.0 ONYCHOCRYPTOSIS: ICD-10-CM

## 2024-04-23 DIAGNOSIS — N18.31 STAGE 3A CHRONIC KIDNEY DISEASE (HCC): Primary | ICD-10-CM

## 2024-04-23 DIAGNOSIS — B35.1 ONYCHOMYCOSIS: ICD-10-CM

## 2024-04-23 PROCEDURE — 1159F MED LIST DOCD IN RCRD: CPT | Performed by: PODIATRIST

## 2024-04-23 PROCEDURE — 99213 OFFICE O/P EST LOW 20 MIN: CPT | Performed by: PODIATRIST

## 2024-04-23 NOTE — PROGRESS NOTES
Priyank Araujo is a 73 year old male.   Chief Complaint   Patient presents with    Toenail Care     Nail care and foot check- A1c 6.1 on 12/7         HPI:   The gentleman presents to the clinic for nail care and diabetic foot check as he is now been diagnosed prediabetic.  He is not having any type of pain.  He denies any sores ulcerations or draining areas on his feet.  At today's visit reviewed nurse's history as taken above, allergies medications and medical history as documented below.  All changes duly noted  Allergies: Cephalosporins and Keflex   Current Outpatient Medications   Medication Sig Dispense Refill    losartan-hydroCHLOROthiazide 100-25 MG Oral Tab Take 1 tablet by mouth daily. 90 tablet 3    simvastatin 40 MG Oral Tab Take 1 tablet (40 mg total) by mouth nightly. 90 tablet 3    calcium carbonate-vitamin D 250-3.125 MG-MCG Oral Tab Take 1 tablet by mouth daily.      VENTOLIN  (90 Base) MCG/ACT Inhalation Aero Soln USE 2 INHALATIONS BY MOUTH INTO  THE LUNGS EVERY 4 HOURS AS  NEEDED FOR WHEEZING 108 g 3    folic acid 800 MCG Oral Tab Take 1 tablet (800 mcg total) by mouth daily.      acetaminophen-codeine 300-30 MG Oral Tab       bacitracin 500 UNIT/GM External Ointment Apply topically as needed.      mupirocin 2 % External Ointment Apply to affected area three times daily for 7 days 1 g 0    Homeopathic Products (ARNICA) External Gel Apply topically as needed.        Lutein 20 MG Oral Cap Take 20 mg by mouth daily.      Vitamin D3, Cholecalciferol, 50 MCG (2000 UT) Oral Tab Take by mouth.        Pyridoxine HCl (VITAMIN B-6) 100 MG Oral Tab Take 10 tablets (1,000 mg total) by mouth twice a week.      Polyethyl Glycol-Propyl Glycol 0.4-0.3 % Ophthalmic Gel Apply to eye.        Aspirin (ASPIR-81 OR) Take 81 mg by mouth daily.      Coenzyme Q10 (COQ-10) 200 MG Oral Cap Take 100 mg by mouth daily.        Past Medical History:    Chronic obstructive pulmonary disease with acute  exacerbation (HCC)    CPAP (continuous positive airway pressure) dependence    Hereditary hemochromatosis (HCC)    Influenza A (H1N1)    Kidney calculi    Meralgia paresthetica    Plantar fasciitis    Trigger finger      Past Surgical History:   Procedure Laterality Date    Anesth,surgery of shoulder  1/1/04    lump left    Melecio localization wire 1 site left (cpt=19281)  10/2020    Abscess formation, chronic inflammation    Melecio localization wire 1 site right (cpt=19281)  2000    Needle biopsy left  10/2020    Inflammatory exudate    Needle biopsy left  10/2020    LN    Other surgical history  9/1/70    testis    Other surgical history  10/30/2020    I&D BREAST ABSCESS W/ ESH     Other surgical history  09/03/2021    groin cyst - ESH       Family History   Problem Relation Age of Onset    Other (heart failure) Father     Other (emphysema) Father     Diabetes Mother       Social History     Socioeconomic History    Marital status:    Tobacco Use    Smoking status: Every Day     Current packs/day: 1.00     Types: Cigarettes    Smokeless tobacco: Never   Vaping Use    Vaping status: Never Used   Substance and Sexual Activity    Alcohol use: No     Alcohol/week: 0.0 standard drinks of alcohol     Comment: social     Drug use: No   Other Topics Concern    Caffeine Concern Yes    Exercise No           REVIEW OF SYSTEMS:   Today reviewed systens as documented below  GENERAL HEALTH: feels well otherwise  SKIN: Refer to exam below  RESPIRATORY: denies shortness of breath with exertion  CARDIOVASCULAR: denies chest pain on exertion  GI: denies abdominal pain and denies heartburn  NEURO: denies headaches    EXAM:   There were no vitals taken for this visit.  GENERAL: well developed, well nourished, in no apparent distress  EXTREMITIES:   1. Integument: The skin on his feet is warm and dry.  His nails 1-5 have relatively normal thickness with the exception of the lateral aspect only of the right hallux nail which is  thicker.  The medial lateral nail borders of the hallux are incurvated and have some degree of hyperkeratotic impaction there is no sign of infection present no tenderness on palpation.   2. Vascular: Has palpable pulses dorsalis pedis and posterior tibial   3. Neurologic: Patient can feel the Armstrong-Sunita 10 g filament in all dermatomes   4. Musculoskeletal: Has excellent muscle strength all groups affecting the foot and ankle fired 5 out of 5 against resistance.    ASSESSMENT AND PLAN:   Diagnoses and all orders for this visit:    Stage 3a chronic kidney disease (HCC)    Onychocryptosis    Onychomycosis        Plan: Today using a nail nippers were trimmed and debrided toenails 1-5 manually and mechanically in girth and width as far down to healthy tissue as possible on both feet.  This was done uneventfully there was no hemorrhage.  There is mild incurvation of the medial and lateral nail borders of the hallux which using a slant back technique were removed and they would not get ingrown.   At today's visit I reminded the patient about daily foot checks  Reminded patient again of proper control of his diabetes to help prevent any severe complications in his feet  Proper foot hygiene moisturizing except between the toes was reviewed  Advised follow-up again every 2 to 3 months for routine care but emphasized to him the importance of coming in sooner if he notices any problems.  During that he has prediabetes as well as stage IIIa chronic kidney disease.    The patient indicates understanding of these issues and agrees to the plan.    Curtis Gutierrez DPM

## 2024-05-16 ENCOUNTER — OFFICE VISIT (OUTPATIENT)
Dept: FAMILY MEDICINE CLINIC | Facility: CLINIC | Age: 74
End: 2024-05-16

## 2024-05-16 VITALS
BODY MASS INDEX: 36.79 KG/M2 | SYSTOLIC BLOOD PRESSURE: 122 MMHG | HEART RATE: 98 BPM | RESPIRATION RATE: 20 BRPM | HEIGHT: 70 IN | WEIGHT: 257 LBS | DIASTOLIC BLOOD PRESSURE: 60 MMHG | OXYGEN SATURATION: 98 %

## 2024-05-16 DIAGNOSIS — Z72.0 TOBACCO ABUSE: ICD-10-CM

## 2024-05-16 DIAGNOSIS — Z01.810 PREOP CARDIOVASCULAR EXAM: Primary | ICD-10-CM

## 2024-05-16 DIAGNOSIS — H25.013 CORTICAL AGE-RELATED CATARACT OF BOTH EYES: ICD-10-CM

## 2024-05-16 DIAGNOSIS — N18.31 STAGE 3A CHRONIC KIDNEY DISEASE (HCC): ICD-10-CM

## 2024-05-16 DIAGNOSIS — Q98.4 KLINEFELTER SYNDROME (HCC): ICD-10-CM

## 2024-05-16 DIAGNOSIS — D69.6 THROMBOCYTOPENIA (HCC): ICD-10-CM

## 2024-05-16 DIAGNOSIS — G47.33 OSA ON CPAP: ICD-10-CM

## 2024-05-16 DIAGNOSIS — E83.110 HEMOCHROMATOSIS, HEREDITARY (HCC): ICD-10-CM

## 2024-05-16 DIAGNOSIS — E78.00 PURE HYPERCHOLESTEROLEMIA: ICD-10-CM

## 2024-05-16 PROCEDURE — 3078F DIAST BP <80 MM HG: CPT | Performed by: FAMILY MEDICINE

## 2024-05-16 PROCEDURE — 3008F BODY MASS INDEX DOCD: CPT | Performed by: FAMILY MEDICINE

## 2024-05-16 PROCEDURE — 99214 OFFICE O/P EST MOD 30 MIN: CPT | Performed by: FAMILY MEDICINE

## 2024-05-16 PROCEDURE — 3074F SYST BP LT 130 MM HG: CPT | Performed by: FAMILY MEDICINE

## 2024-05-16 PROCEDURE — 1159F MED LIST DOCD IN RCRD: CPT | Performed by: FAMILY MEDICINE

## 2024-05-16 PROCEDURE — 1160F RVW MEDS BY RX/DR IN RCRD: CPT | Performed by: FAMILY MEDICINE

## 2024-05-16 RX ORDER — HYDROCODONE BITARTRATE AND ACETAMINOPHEN 5; 325 MG/1; MG/1
1 TABLET ORAL EVERY 4 HOURS PRN
COMMUNITY
Start: 2024-05-06

## 2024-05-16 RX ORDER — LATANOPROST 50 UG/ML
SOLUTION/ DROPS OPHTHALMIC
COMMUNITY
Start: 2024-05-15

## 2024-05-16 NOTE — PROGRESS NOTES
Priyank Araujo is a 73 year old male who presents for preop clearance for   Left cataract removal and right cataract removal   Dr. Rock requesting clearance.  6/5/24 for left  6/26/24 for right   At [facility]   HPI:   Pt complains of vision changes .  Pt denies any chest pain/sob/dizziness or lightheadedness at rest or with exertion.  No previous reaction or problems with anesthesia.    Wt Readings from Last 6 Encounters:   05/16/24 257 lb (116.6 kg)   12/07/23 259 lb 12.8 oz (117.8 kg)   05/12/23 264 lb (119.7 kg)   11/18/22 254 lb 12.8 oz (115.6 kg)   05/13/22 257 lb (116.6 kg)   05/04/22 256 lb (116.1 kg)     Body mass index is 36.88 kg/m².     Cholesterol, Total (mg/dL)   Date Value   06/14/2023 124   05/25/2022 118   05/12/2021 118     CHOLESTEROL, TOTAL (mg/dL)   Date Value   04/20/2011 155     CHOLESTEROL (mg/dL)   Date Value   05/13/2013 123   01/17/2012 138     Cholesterol (mg/dL)   Date Value   04/22/2019 126.00     HDL Cholesterol (mg/dL)   Date Value   06/14/2023 52   05/25/2022 46   05/12/2021 46     HDL CHOLESTEROL (mg/dL)   Date Value   04/20/2011 54     HDL CHOL (mg/dL)   Date Value   05/13/2013 41 (L)   01/17/2012 53     Direct HDL (mg/dL)   Date Value   04/22/2019 50     LDL Cholesterol (mg/dL)   Date Value   06/14/2023 51   05/25/2022 54   05/12/2021 51     LDL-CHOLESTEROL (mg/dL (calc))   Date Value   04/20/2011 79     LDL CHOLESTROL (mg/dL)   Date Value   05/13/2013 58   01/17/2012 66     Calculated LDL (mg/dL)   Date Value   04/22/2019 54     AST (U/L)   Date Value   12/07/2023 17   06/14/2023 25   11/07/2022 23   04/22/2019 24   05/13/2013 17   01/17/2012 19   04/20/2011 19     ALT (U/L)   Date Value   12/07/2023 29   06/14/2023 36   11/07/2022 32   04/22/2019 35   05/13/2013 30   01/17/2012 34   04/20/2011 25      Current Outpatient Medications   Medication Sig Dispense Refill    latanoprost 0.005 % Ophthalmic Solution       HYDROcodone-acetaminophen 5-325 MG Oral Tab  Take 1 tablet by mouth every 4 (four) hours as needed for Pain.      losartan-hydroCHLOROthiazide 100-25 MG Oral Tab Take 1 tablet by mouth daily. 90 tablet 3    simvastatin 40 MG Oral Tab Take 1 tablet (40 mg total) by mouth nightly. 90 tablet 3    calcium carbonate-vitamin D 250-3.125 MG-MCG Oral Tab Take 1 tablet by mouth daily.      VENTOLIN  (90 Base) MCG/ACT Inhalation Aero Soln USE 2 INHALATIONS BY MOUTH INTO  THE LUNGS EVERY 4 HOURS AS  NEEDED FOR WHEEZING 108 g 3    folic acid 800 MCG Oral Tab Take 1 tablet (800 mcg total) by mouth daily.      acetaminophen-codeine 300-30 MG Oral Tab       bacitracin 500 UNIT/GM External Ointment Apply topically as needed.      mupirocin 2 % External Ointment Apply to affected area three times daily for 7 days 1 g 0    Homeopathic Products (ARNICA) External Gel Apply topically as needed.        Lutein 20 MG Oral Cap Take 20 mg by mouth daily.      Vitamin D3, Cholecalciferol, 50 MCG (2000 UT) Oral Tab Take by mouth.        Pyridoxine HCl (VITAMIN B-6) 100 MG Oral Tab Take 10 tablets (1,000 mg total) by mouth twice a week.      Polyethyl Glycol-Propyl Glycol 0.4-0.3 % Ophthalmic Gel Apply to eye.        Aspirin (ASPIR-81 OR) Take 81 mg by mouth daily.      Coenzyme Q10 (COQ-10) 200 MG Oral Cap Take 100 mg by mouth daily.        Past Medical History:    Chronic obstructive pulmonary disease with acute exacerbation (HCC)    CPAP (continuous positive airway pressure) dependence    Hereditary hemochromatosis (HCC)    Influenza A (H1N1)    Kidney calculi    Meralgia paresthetica    Plantar fasciitis    Trigger finger      Past Surgical History:   Procedure Laterality Date    Anesth,surgery of shoulder  1/1/04    lump left    Melecio localization wire 1 site left (cpt=19281)  10/2020    Abscess formation, chronic inflammation    Melecio localization wire 1 site right (cpt=19281)  2000    Needle biopsy left  10/2020    Inflammatory exudate    Needle biopsy left  10/2020    LN     Other surgical history  9/1/70    testis    Other surgical history  10/30/2020    I&D BREAST ABSCESS W/ ESH     Other surgical history  09/03/2021    groin cyst - ESH       Family History   Problem Relation Age of Onset    Other (heart failure) Father     Other (emphysema) Father     Diabetes Mother       Social History:  Social History     Socioeconomic History    Marital status:    Tobacco Use    Smoking status: Every Day     Current packs/day: 1.00     Types: Cigarettes    Smokeless tobacco: Never   Vaping Use    Vaping status: Never Used   Substance and Sexual Activity    Alcohol use: No     Alcohol/week: 0.0 standard drinks of alcohol     Comment: social     Drug use: No   Other Topics Concern    Caffeine Concern Yes    Exercise No      Occ: . : . Children: no   Retired    Exercise: minimal.  Diet: watches minimally     REVIEW OF SYSTEMS:   GENERAL: feels well otherwise  SKIN: denies any unusual skin lesions  EYES:denies blurred vision or double vision  HEENT: denies nasal congestion, sinus pain or ST  LUNGS: denies shortness of breath with exertion  CARDIOVASCULAR: denies chest pain on exertion  GI: denies abdominal pain,denies heartburn  : denies nocturia or changes in stream  MUSCULOSKELETAL: denies back pain  NEURO: denies headaches  PSYCHE: denies depression or anxiety  HEMATOLOGIC: denies hx of anemia  ENDOCRINE: denies thyroid history  ALL/ASTHMA: denies hx of allergy or asthma    EXAM:   /60   Pulse 98   Resp 20   Ht 5' 10\" (1.778 m)   Wt 257 lb (116.6 kg)   SpO2 98%   BMI 36.88 kg/m²   Body mass index is 36.88 kg/m².   GENERAL: well developed, well nourished,in no apparent distress  SKIN: no rashes,no suspicious lesions  HEENT: atraumatic, normocephalic,ears and throat are clear  EYES:PERRLA, EOMI, normal optic disk,conjunctiva are clear  NECK: supple,no adenopathy,no bruits, no bruits   CHEST: no chest tenderness  BREAST: no dominant or suspicious mass  LUNGS: clear to  auscultation  CARDIO: RRR without murmur  GI: good BS's,no masses, HSM or tenderness  MUSCULOSKELETAL: back is not tender,FROM of the back  EXTREMITIES: no cyanosis, clubbing or edema  NEURO: Oriented times three,cranial nerves are intact,motor and sensory are grossly intact    ASSESSMENT AND PLAN:   Priyank Araujo is a 73 year old male who presents for preop clearance.     1. Preop cardiovascular exam  Cleared for low risk surgery     2. Cortical age-related cataract of both eyes      3. Pure hypercholesterolemia      4. PURNIMA on CPAP      5. Thrombocytopenia (HCC)      6. Klinefelter syndrome (HCC)      7. Hemochromatosis, hereditary (HCC)      8. Stage 3a chronic kidney disease (HCC)      9. Tobacco abuse        Pt is low-moderate risk for a low risk procedure.   Cleared for surgery.   RTC august or sooner if needed.

## 2024-06-25 ENCOUNTER — OFFICE VISIT (OUTPATIENT)
Dept: PODIATRY CLINIC | Facility: CLINIC | Age: 74
End: 2024-06-25

## 2024-06-25 DIAGNOSIS — N18.31 STAGE 3A CHRONIC KIDNEY DISEASE (HCC): ICD-10-CM

## 2024-06-25 DIAGNOSIS — L60.0 ONYCHOCRYPTOSIS: Primary | ICD-10-CM

## 2024-06-25 DIAGNOSIS — B35.1 ONYCHOMYCOSIS: ICD-10-CM

## 2024-06-25 PROCEDURE — 99213 OFFICE O/P EST LOW 20 MIN: CPT | Performed by: PODIATRIST

## 2024-06-25 PROCEDURE — 1159F MED LIST DOCD IN RCRD: CPT | Performed by: PODIATRIST

## 2024-06-25 NOTE — PROGRESS NOTES
Priyank Araujo is a 73 year old male.   Chief Complaint   Patient presents with    Toenail Care     F/u Toenail Care. Elderly patient unable to trim toenails due to medical condition.         HPI:   The gentleman presents to the clinic for nail care and diabetic foot check as he is now been diagnosed prediabetic.  He is currently recovering from a cataract surgery on his left eye evidently is now 3 weeks status post surgery may not be doing too well with that he is not sure and is about ready to have his other 1 done soon.  At today's visit reviewed nurse's history as taken above, allergies medications and medical history as documented below.  All changes duly noted  Allergies: Cephalosporins and Keflex   Current Outpatient Medications   Medication Sig Dispense Refill    latanoprost 0.005 % Ophthalmic Solution       HYDROcodone-acetaminophen 5-325 MG Oral Tab Take 1 tablet by mouth every 4 (four) hours as needed for Pain.      losartan-hydroCHLOROthiazide 100-25 MG Oral Tab Take 1 tablet by mouth daily. (Patient taking differently: Take 1 tablet by mouth daily. FOR KIDNEY STONES) 90 tablet 3    simvastatin 40 MG Oral Tab Take 1 tablet (40 mg total) by mouth nightly. 90 tablet 3    calcium carbonate-vitamin D 250-3.125 MG-MCG Oral Tab Take 1 tablet by mouth daily.      VENTOLIN  (90 Base) MCG/ACT Inhalation Aero Soln USE 2 INHALATIONS BY MOUTH INTO  THE LUNGS EVERY 4 HOURS AS  NEEDED FOR WHEEZING 108 g 3    folic acid 800 MCG Oral Tab Take 1 tablet (800 mcg total) by mouth daily.      acetaminophen-codeine 300-30 MG Oral Tab       bacitracin 500 UNIT/GM External Ointment Apply topically as needed.      mupirocin 2 % External Ointment Apply to affected area three times daily for 7 days 1 g 0    Homeopathic Products (ARNICA) External Gel Apply topically as needed.        Lutein 20 MG Oral Cap Take 20 mg by mouth daily.      Vitamin D3, Cholecalciferol, 50 MCG (2000 UT) Oral Tab Take by mouth.         Pyridoxine HCl (VITAMIN B-6) 100 MG Oral Tab Take 10 tablets (1,000 mg total) by mouth twice a week.      Polyethyl Glycol-Propyl Glycol 0.4-0.3 % Ophthalmic Gel Apply to eye.        Aspirin (ASPIR-81 OR) Take 81 mg by mouth daily.      Coenzyme Q10 (COQ-10) 200 MG Oral Cap Take 100 mg by mouth daily.        Past Medical History:    Chronic obstructive pulmonary disease with acute exacerbation (HCC)    CPAP (continuous positive airway pressure) dependence    Hereditary hemochromatosis (HCC)    Influenza A (H1N1)    Kidney calculi    Meralgia paresthetica    Plantar fasciitis    Trigger finger      Past Surgical History:   Procedure Laterality Date    Anesth,surgery of shoulder  1/1/04    lump left    Melecio localization wire 1 site left (cpt=19281)  10/2020    Abscess formation, chronic inflammation    Melecio localization wire 1 site right (cpt=19281)  2000    Needle biopsy left  10/2020    Inflammatory exudate    Needle biopsy left  10/2020    LN    Other surgical history  9/1/70    testis    Other surgical history  10/30/2020    I&D BREAST ABSCESS W/ ESH     Other surgical history  09/03/2021    groin cyst - E[facility]       Family History   Problem Relation Age of Onset    Other (heart failure) Father     Other (emphysema) Father     Diabetes Mother       Social History     Socioeconomic History    Marital status:    Tobacco Use    Smoking status: Every Day     Current packs/day: 1.00     Types: Cigarettes    Smokeless tobacco: Never   Vaping Use    Vaping status: Never Used   Substance and Sexual Activity    Alcohol use: No     Alcohol/week: 0.0 standard drinks of alcohol     Comment: social     Drug use: No   Other Topics Concern    Caffeine Concern Yes    Exercise No           REVIEW OF SYSTEMS:   Today reviewed systens as documented below  GENERAL HEALTH: feels well otherwise  SKIN: Refer to exam below  RESPIRATORY: denies shortness of breath with exertion  CARDIOVASCULAR: denies chest pain on exertion  GI: denies  abdominal pain and denies heartburn  NEURO: denies headaches    EXAM:   There were no vitals taken for this visit.  GENERAL: well developed, well nourished, in no apparent distress  EXTREMITIES:   1. Integument: The skin on his feet is warm and dry.  His nails 1-5 have relatively normal thickness with the exception of the lateral aspect only of the right hallux nail which is thicker.  The medial lateral nail borders of the hallux are incurvated and have some degree of hyperkeratotic impaction there is no sign of infection present no tenderness on palpation.   2. Vascular: Has palpable pulses dorsalis pedis and posterior tibial   3. Neurologic: Patient can feel the [place] 10 g filament in all dermatomes   4. Musculoskeletal: Has excellent muscle strength all groups affecting the foot and ankle fired 5 out of 5 against resistance.    ASSESSMENT AND PLAN:   Diagnoses and all orders for this visit:    Onychocryptosis    Onychomycosis    Stage 3a chronic kidney disease (HCC)        Plan: Today using a nail nippers were trimmed and debrided toenails 1-5 manually and mechanically in girth and width as far down to healthy tissue as possible on both feet.  This was done uneventfully there was no hemorrhage.  There is mild incurvation of the medial and lateral nail borders of the hallux which using a slant back technique were removed and they would not get ingrown.   At today's visit I reminded the patient about daily foot checks  Proper foot hygiene moisturizing except between the toes was reviewed  Advised follow-up again every 2 to 3 months for routine care but emphasized to him the importance of coming in sooner if he notices any problems.  During that he has prediabetes as well as stage IIIa chronic kidney disease.    The patient indicates understanding of these issues and agrees to the plan.    Curtis Gutierrez DPM

## 2024-06-27 ENCOUNTER — PATIENT MESSAGE (OUTPATIENT)
Dept: FAMILY MEDICINE CLINIC | Facility: CLINIC | Age: 74
End: 2024-06-27

## 2024-06-27 NOTE — TELEPHONE ENCOUNTER
From: Priyank Araujo  To: Zita Andujar  Sent: 6/27/2024 2:21 PM CDT  Subject: COVID-19 Booster    Just letting you know I got the Moderna COVID-19 Booster shot today June 27, 2024

## 2024-08-03 DIAGNOSIS — Z72.0 TOBACCO ABUSE: ICD-10-CM

## 2024-08-05 RX ORDER — ALBUTEROL SULFATE 90 UG/1
2 AEROSOL, METERED RESPIRATORY (INHALATION) EVERY 4 HOURS PRN
Qty: 108 G | Refills: 3 | Status: SHIPPED | OUTPATIENT
Start: 2024-08-05

## 2024-08-07 ENCOUNTER — LAB ENCOUNTER (OUTPATIENT)
Dept: LAB | Age: 74
End: 2024-08-07
Attending: FAMILY MEDICINE
Payer: MEDICARE

## 2024-08-07 ENCOUNTER — OFFICE VISIT (OUTPATIENT)
Dept: FAMILY MEDICINE CLINIC | Facility: CLINIC | Age: 74
End: 2024-08-07
Payer: COMMERCIAL

## 2024-08-07 VITALS
WEIGHT: 257 LBS | OXYGEN SATURATION: 97 % | HEART RATE: 78 BPM | BODY MASS INDEX: 36.79 KG/M2 | SYSTOLIC BLOOD PRESSURE: 114 MMHG | HEIGHT: 70 IN | RESPIRATION RATE: 16 BRPM | DIASTOLIC BLOOD PRESSURE: 62 MMHG

## 2024-08-07 DIAGNOSIS — E55.9 VITAMIN D DEFICIENCY: ICD-10-CM

## 2024-08-07 DIAGNOSIS — Z00.00 ENCOUNTER FOR ANNUAL HEALTH EXAMINATION: Primary | ICD-10-CM

## 2024-08-07 DIAGNOSIS — R53.83 OTHER FATIGUE: ICD-10-CM

## 2024-08-07 DIAGNOSIS — N18.31 STAGE 3A CHRONIC KIDNEY DISEASE (HCC): Chronic | ICD-10-CM

## 2024-08-07 DIAGNOSIS — E78.00 PURE HYPERCHOLESTEROLEMIA: ICD-10-CM

## 2024-08-07 DIAGNOSIS — Z87.891 PERSONAL HISTORY OF TOBACCO USE, PRESENTING HAZARDS TO HEALTH: ICD-10-CM

## 2024-08-07 DIAGNOSIS — R10.9 FLANK PAIN: ICD-10-CM

## 2024-08-07 DIAGNOSIS — Z12.5 SCREENING PSA (PROSTATE SPECIFIC ANTIGEN): ICD-10-CM

## 2024-08-07 DIAGNOSIS — Z87.442 HISTORY OF KIDNEY STONES: ICD-10-CM

## 2024-08-07 DIAGNOSIS — E83.110 HEMOCHROMATOSIS ASSOCIATED WITH COMPOUND HETEROZYGOUS MUTATION IN HFE GENE (HCC): ICD-10-CM

## 2024-08-07 DIAGNOSIS — E66.01 SEVERE OBESITY (BMI 35.0-39.9) WITH COMORBIDITY (HCC): Chronic | ICD-10-CM

## 2024-08-07 DIAGNOSIS — G47.33 OSA ON CPAP: ICD-10-CM

## 2024-08-07 DIAGNOSIS — R73.9 HYPERGLYCEMIA: ICD-10-CM

## 2024-08-07 DIAGNOSIS — Z12.2 SCREENING FOR LUNG CANCER: ICD-10-CM

## 2024-08-07 DIAGNOSIS — D69.6 THROMBOCYTOPENIA (HCC): Chronic | ICD-10-CM

## 2024-08-07 DIAGNOSIS — Q98.4 KLINEFELTER SYNDROME (HCC): ICD-10-CM

## 2024-08-07 PROBLEM — L02.31 GLUTEAL ABSCESS: Status: RESOLVED | Noted: 2024-03-18 | Resolved: 2024-08-07

## 2024-08-07 LAB
ALBUMIN SERPL-MCNC: 4.8 G/DL (ref 3.2–4.8)
ALBUMIN/GLOB SERPL: 1.3 {RATIO} (ref 1–2)
ALP LIVER SERPL-CCNC: 51 U/L
ALT SERPL-CCNC: 27 U/L
ANION GAP SERPL CALC-SCNC: 3 MMOL/L (ref 0–18)
AST SERPL-CCNC: 24 U/L (ref ?–34)
BASOPHILS # BLD AUTO: 0.04 X10(3) UL (ref 0–0.2)
BASOPHILS NFR BLD AUTO: 0.5 %
BILIRUB SERPL-MCNC: 0.8 MG/DL (ref 0.2–1.1)
BUN BLD-MCNC: 17 MG/DL (ref 9–23)
CALCIUM BLD-MCNC: 10.6 MG/DL (ref 8.7–10.4)
CHLORIDE SERPL-SCNC: 104 MMOL/L (ref 98–112)
CHOLEST SERPL-MCNC: 128 MG/DL (ref ?–200)
CO2 SERPL-SCNC: 27 MMOL/L (ref 21–32)
COMPLEXED PSA SERPL-MCNC: 2.28 NG/ML (ref ?–4)
CREAT BLD-MCNC: 1.27 MG/DL
EGFRCR SERPLBLD CKD-EPI 2021: 60 ML/MIN/1.73M2 (ref 60–?)
EOSINOPHIL # BLD AUTO: 0.21 X10(3) UL (ref 0–0.7)
EOSINOPHIL NFR BLD AUTO: 2.7 %
ERYTHROCYTE [DISTWIDTH] IN BLOOD BY AUTOMATED COUNT: 13.1 %
EST. AVERAGE GLUCOSE BLD GHB EST-MCNC: 137 MG/DL (ref 68–126)
FASTING PATIENT LIPID ANSWER: YES
FASTING STATUS PATIENT QL REPORTED: YES
GLOBULIN PLAS-MCNC: 3.6 G/DL (ref 2–3.5)
GLUCOSE BLD-MCNC: 108 MG/DL (ref 70–99)
HBA1C MFR BLD: 6.4 % (ref ?–5.7)
HCT VFR BLD AUTO: 43.2 %
HDLC SERPL-MCNC: 52 MG/DL (ref 40–59)
HGB BLD-MCNC: 14.9 G/DL
IMM GRANULOCYTES # BLD AUTO: 0.02 X10(3) UL (ref 0–1)
IMM GRANULOCYTES NFR BLD: 0.3 %
LDLC SERPL CALC-MCNC: 55 MG/DL (ref ?–100)
LYMPHOCYTES # BLD AUTO: 2.13 X10(3) UL (ref 1–4)
LYMPHOCYTES NFR BLD AUTO: 27.3 %
MCH RBC QN AUTO: 32.9 PG (ref 26–34)
MCHC RBC AUTO-ENTMCNC: 34.5 G/DL (ref 31–37)
MCV RBC AUTO: 95.4 FL
MONOCYTES # BLD AUTO: 0.76 X10(3) UL (ref 0.1–1)
MONOCYTES NFR BLD AUTO: 9.8 %
NEUTROPHILS # BLD AUTO: 4.63 X10 (3) UL (ref 1.5–7.7)
NEUTROPHILS # BLD AUTO: 4.63 X10(3) UL (ref 1.5–7.7)
NEUTROPHILS NFR BLD AUTO: 59.4 %
NONHDLC SERPL-MCNC: 76 MG/DL (ref ?–130)
OSMOLALITY SERPL CALC.SUM OF ELEC: 280 MOSM/KG (ref 275–295)
PLATELET # BLD AUTO: 150 10(3)UL (ref 150–450)
POTASSIUM SERPL-SCNC: 3.8 MMOL/L (ref 3.5–5.1)
PROT SERPL-MCNC: 8.4 G/DL (ref 5.7–8.2)
RBC # BLD AUTO: 4.53 X10(6)UL
SODIUM SERPL-SCNC: 134 MMOL/L (ref 136–145)
T4 FREE SERPL-MCNC: 1.2 NG/DL (ref 0.8–1.7)
TRIGL SERPL-MCNC: 114 MG/DL (ref 30–149)
TSI SER-ACNC: 2.27 MIU/ML (ref 0.55–4.78)
VIT B12 SERPL-MCNC: 1333 PG/ML (ref 211–911)
VIT D+METAB SERPL-MCNC: 54 NG/ML (ref 30–100)
VLDLC SERPL CALC-MCNC: 17 MG/DL (ref 0–30)
WBC # BLD AUTO: 7.8 X10(3) UL (ref 4–11)

## 2024-08-07 PROCEDURE — 96160 PT-FOCUSED HLTH RISK ASSMT: CPT | Performed by: FAMILY MEDICINE

## 2024-08-07 PROCEDURE — 3074F SYST BP LT 130 MM HG: CPT | Performed by: FAMILY MEDICINE

## 2024-08-07 PROCEDURE — 1159F MED LIST DOCD IN RCRD: CPT | Performed by: FAMILY MEDICINE

## 2024-08-07 PROCEDURE — 85025 COMPLETE CBC W/AUTO DIFF WBC: CPT

## 2024-08-07 PROCEDURE — 3078F DIAST BP <80 MM HG: CPT | Performed by: FAMILY MEDICINE

## 2024-08-07 PROCEDURE — 84443 ASSAY THYROID STIM HORMONE: CPT

## 2024-08-07 PROCEDURE — 84439 ASSAY OF FREE THYROXINE: CPT

## 2024-08-07 PROCEDURE — 3008F BODY MASS INDEX DOCD: CPT | Performed by: FAMILY MEDICINE

## 2024-08-07 PROCEDURE — 36415 COLL VENOUS BLD VENIPUNCTURE: CPT

## 2024-08-07 PROCEDURE — 82607 VITAMIN B-12: CPT

## 2024-08-07 PROCEDURE — G0296 VISIT TO DETERM LDCT ELIG: HCPCS | Performed by: FAMILY MEDICINE

## 2024-08-07 PROCEDURE — 99499 UNLISTED E&M SERVICE: CPT | Performed by: FAMILY MEDICINE

## 2024-08-07 PROCEDURE — 83036 HEMOGLOBIN GLYCOSYLATED A1C: CPT

## 2024-08-07 PROCEDURE — 80061 LIPID PANEL: CPT

## 2024-08-07 PROCEDURE — G0439 PPPS, SUBSEQ VISIT: HCPCS | Performed by: FAMILY MEDICINE

## 2024-08-07 PROCEDURE — 99214 OFFICE O/P EST MOD 30 MIN: CPT | Performed by: FAMILY MEDICINE

## 2024-08-07 PROCEDURE — 80053 COMPREHEN METABOLIC PANEL: CPT

## 2024-08-07 PROCEDURE — 1160F RVW MEDS BY RX/DR IN RCRD: CPT | Performed by: FAMILY MEDICINE

## 2024-08-07 PROCEDURE — 82306 VITAMIN D 25 HYDROXY: CPT

## 2024-08-07 PROCEDURE — 1170F FXNL STATUS ASSESSED: CPT | Performed by: FAMILY MEDICINE

## 2024-08-07 RX ORDER — ALBUTEROL SULFATE 90 UG/1
2 AEROSOL, METERED RESPIRATORY (INHALATION) DAILY
Qty: 3 EACH | Refills: 2 | Status: SHIPPED | OUTPATIENT
Start: 2024-08-07 | End: 2024-08-12 | Stop reason: CLARIF

## 2024-08-07 RX ORDER — PREDNISOLONE ACETATE 10 MG/ML
SUSPENSION/ DROPS OPHTHALMIC
COMMUNITY
Start: 2024-06-14

## 2024-08-07 NOTE — PROGRESS NOTES
Subjective:   Priyank Araujo is a 73 year old male who presents for a MA AHA (Medicare Advantage Annual Health Assessment) and Medicare Subsequent Annual Wellness visit (Pt already had Initial Annual Wellness) and scheduled follow up of multiple significant but stable problems.   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      Cognitive Assessment:   He had a completely normal cognitive assessment - see flowsheet entries     Functional Ability/Status:   Priyank Araujo has some abnormal functions as listed below:  He has Hearing problems based on screening of functional status.He has Walking problems based on screening of functional status.       Depression Screening (PHQ):  PHQ-2 SCORE: 0  , done 7/28/2024       Advanced Directives:   He does have a Living Will but we do NOT have it on file in Epic.    He does have a POA but we do NOT have it on file in Epic.    Discussed Advance Care Planning with patient (and family/surrogate if present). Standard forms made available to patient in After Visit Summary.      Patient Active Problem List   Diagnosis    PURNIMA on CPAP- stable     Hemochromatosis associated with compound heterozygous mutation in HFE gene (HCC)- stable     Pure hypercholesterolemia- stable     Severe obesity (BMI 35.0-39.9) with comorbidity (HCC)- stable     Thrombocytopenia (HCC)- stable     Klinefelter syndrome (HCC)- stable     CKD (chronic kidney disease) stage 3, GFR 30-59 ml/min (HCC)- stable      Allergies:  He is allergic to cephalosporins and keflex.    Current Medications:  Outpatient Medications Marked as  Taking for the 8/7/24 encounter (Office Visit) with Zita Andujar, DO   Medication Sig    prednisoLONE 1 % Ophthalmic Suspension INSTILL1 DROP TO SURGICAL EYE STARTING THE DAY OF SURGERY AFTER SURGERY 4 TIMES DAILY FOR 30 DAYS    polypropylene glycol- 0.4-0.3 % Ophthalmic Solution Place 1 drop into the right eye as needed.    albuterol 108 (90 Base) MCG/ACT Inhalation Aero Soln Inhale 2 puffs into the lungs daily. Dispense spacer    albuterol (VENTOLIN HFA) 108 (90 Base) MCG/ACT Inhalation Aero Soln Inhale 2 puffs into the lungs every 4 (four) hours as needed for Wheezing.    latanoprost 0.005 % Ophthalmic Solution     HYDROcodone-acetaminophen 5-325 MG Oral Tab Take 1 tablet by mouth every 4 (four) hours as needed for Pain.    losartan-hydroCHLOROthiazide 100-25 MG Oral Tab Take 1 tablet by mouth daily.    simvastatin 40 MG Oral Tab Take 1 tablet (40 mg total) by mouth nightly.    calcium carbonate-vitamin D 250-3.125 MG-MCG Oral Tab Take 1 tablet by mouth daily.    folic acid 800 MCG Oral Tab Take 1 tablet (800 mcg total) by mouth daily.    acetaminophen-codeine 300-30 MG Oral Tab     bacitracin 500 UNIT/GM External Ointment Apply topically as needed (for cold sores).    mupirocin 2 % External Ointment Apply to affected area three times daily for 7 days    Homeopathic Products (ARNICA) External Gel Apply topically as needed.      Lutein 20 MG Oral Cap Take 20 mg by mouth daily.    Vitamin D3, Cholecalciferol, 50 MCG (2000 UT) Oral Tab Take by mouth.      Pyridoxine HCl (VITAMIN B-6) 100 MG Oral Tab Take 10 tablets (1,000 mg total) by mouth twice a week.    Polyethyl Glycol-Propyl Glycol 0.4-0.3 % Ophthalmic Gel Apply to eye.      Aspirin (ASPIR-81 OR) Take 81 mg by mouth daily.    Coenzyme Q10 (COQ-10) 200 MG Oral Cap Take 100 mg by mouth daily.       Medical History:  He  has a past medical history of Chronic obstructive pulmonary disease with acute exacerbation (HCC) (3/23/2018), CPAP (continuous positive  airway pressure) dependence, Hereditary hemochromatosis (HCC) (01/2009), Influenza A (H1N1) (1/7/2014), Kidney calculi, Meralgia paresthetica (9/11/2013), Plantar fasciitis (7/18/2013), and Trigger finger (6/3/2014).  Surgical History:  He  has a past surgical history that includes anesth,surgery of shoulder (01/01/2004); clarissa localization wire 1 site right (cpt=19281) (2000); clarissa localization wire 1 site left (cpt=19281) (10/2020); needle biopsy left (10/2020); needle biopsy left (10/2020); other surgical history (09/01/1970); other surgical history (10/30/2020); other surgical history (09/03/2021); and Cataract extraction (Left, 06/2024).   Family History:  His family history includes Diabetes in his mother; emphysema in his father; heart failure in his father.  Social History:  He  reports that he has been smoking cigarettes. He has never used smokeless tobacco. He reports that he does not drink alcohol and does not use drugs.    Tobacco:  Social History     Tobacco Use   Smoking Status Every Day    Current packs/day: 1.00    Types: Cigarettes   Smokeless Tobacco Never     E-Cigarettes/Vaping       Questions Responses    E-Cigarette Use Never User               CAGE Alcohol Screen:   CAGE screening score of 0 on 7/28/2024, showing low risk of alcohol abuse.    Patient Care Team:  Zita Andujar DO as PCP - General (Family Medicine)  Miller Cancino, PT, DPT, OCS, Cert MDT  as Physical Therapist (Physical Therapy)    Review of Systems  GENERAL: feels well otherwise  SKIN: denies any unusual skin lesions  EYES: denies blurred vision or double vision  HEENT: denies nasal congestion, sinus pain or ST  LUNGS: denies shortness of breath with exertion  CARDIOVASCULAR: denies chest pain on exertion  GI: denies abdominal pain, denies heartburn  : 1 per night nocturia, no complaint of urinary incontinence  MUSCULOSKELETAL: denies back pain  NEURO: denies headaches  PSYCHE: denies depression or anxiety  HEMATOLOGIC:  denies hx of anemia  ENDOCRINE: denies thyroid history  ALL/ASTHMA: denies  asthma    Objective:   Physical Exam  General Appearance:  Alert, cooperative, no distress, appears stated age   Head:  Normocephalic, without obvious abnormality, atraumatic   Eyes:  PERRL, conjunctiva/corneas clear, EOM's intact, both eyes   Ears:  Normal TM's and external ear canals, both ears   Nose: Nares normal, septum midline, mucosa normal, no drainage or sinus tenderness   Throat: Lips, mucosa, and tongue normal; teeth and gums normal   Neck: Supple, symmetrical, trachea midline, no adenopathy, thyroid: not enlarged, symmetric, no tenderness/mass/nodules, no carotid bruit or JVD   Back:   Symmetric, no curvature, ROM normal, no CVA tenderness   Lungs:   Clear to auscultation bilaterally, respirations unlabored   Chest Wall:  No tenderness or deformity   Heart:  Regular rate and rhythm, S1, S2 normal, no murmur, rub or gallop   Abdomen:   Soft, non-tender, bowel sounds active all four quadrants,  no masses, no organomegaly   Genitalia: Not examined    Rectal: Not examined   Extremities: Extremities normal, atraumatic, no cyanosis or edema   Pulses: 2+ and symmetric   Skin: Skin color, texture, turgor normal, no rashes or lesions   Lymph nodes: Cervical, supraclavicular, and axillary nodes normal   Neurologic: Normal     /62   Pulse 78   Resp 16   Ht 5' 10\" (1.778 m)   Wt 257 lb (116.6 kg)   SpO2 97%   BMI 36.88 kg/m²  Estimated body mass index is 36.88 kg/m² as calculated from the following:    Height as of this encounter: 5' 10\" (1.778 m).    Weight as of this encounter: 257 lb (116.6 kg).    Medicare Hearing Assessment:   Hearing Screening    Screening Method: Whisper Test  Whisper Test Result: Pass         Visual Acuity:   Right Eye Visual Acuity: Uncorrected Right Eye Chart Acuity: 20/30   Left Eye Visual Acuity: Uncorrected Left Eye Chart Acuity: 20/30   Both Eyes Visual Acuity: Uncorrected Both Eyes Chart Acuity:  20/30   Able To Tolerate Visual Acuity: Yes        Assessment & Plan:   Priyank Araujo is a 73 year old male who presents for a Medicare Assessment.     1. Encounter for annual health examination (Primary)  2. 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 Severe obesity (BMI 35.0-39.9) with comorbidity (HCC)- discussed diet and exercise   -     Detailed, Mod Complex (15522)  4. Thrombocytopenia (HCC)- stable monitor   -     Detailed, Mod Complex (84627)  5. Stage 3a chronic kidney disease (HCC)- stable monitor   -     Detailed, Mod Complex (59998)  6. Pure hypercholesterolemia- stable monitor   -     Lipid Panel; Future; Expected date: 08/07/2024  -     Comp Metabolic Panel (14); Future; Expected date: 08/07/2024  -     Detailed, Mod Complex (10177)  7. Klinefelter syndrome (HCC)- stable monitor  -     Detailed, Mod Complex (86238)  8. History of kidney stones- pt declines x-ray   -     XR ABDOMEN (1 VIEW) (CPT=74018); Future; Expected date: 08/07/2024  -     Detailed, Mod Complex (89536)  9. Flank pain- pt declines x-ray   -     XR ABDOMEN (1 VIEW) (CPT=74018); Future; Expected date: 08/07/2024  -     Detailed, Mod Complex (54293)  10. PURNIMA on CPAP- stable cpm  -     Detailed, Mod Complex (93813)  11. Hyperglycemia- stable monitor   -     Hemoglobin A1C; Future; Expected date: 08/07/2024  -     Detailed, Mod Complex (14052)  12. Screening PSA (prostate specific antigen)- check labs   -     PSA Total, Screen; Future; Expected date: 08/07/2024  -     Detailed, Mod Complex (13045)  13. Other fatigue- stable monitor   -     Free T4, (Free Thyroxine); Future; Expected date: 08/07/2024  -     Assay, Thyroid Stim Hormone; Future; Expected date: 08/07/2024  -     CBC With Differential With Platelet; Future; Expected date: 08/07/2024  -     Vitamin B12; Future; Expected date: 08/07/2024  -     Detailed, Mod  Complex (63629)  14. Vitamin D deficiency- stable monitor   -     Vitamin D; Future; Expected date: 08/07/2024  -     Detailed, Mod Complex (98757)  15. Screening for lung cancer- check CT  -     Detailed, Mod Complex (86143)  -     Initial CT Lung Cancer screening (CPT=71271)- Please Answer Questions, Pack years and Smoking status; Future; Expected date: 08/07/2024  -     Shared Decision Making visit for Medicare for Lung Cancer CT screening  16. Personal history of tobacco use, presenting hazards to health- check CT   -     Detailed, Mod Complex (02646)  -     Initial CT Lung Cancer screening (CPT=71271)- Please Answer Questions, Pack years and Smoking status; Future; Expected date: 08/07/2024  -     Shared Decision Making visit for Medicare for Lung Cancer CT screening  Other orders  -     Albuterol Sulfate HFA; Inhale 2 puffs into the lungs daily. Dispense spacer  Dispense: 3 each; Refill: 2    The patient indicates understanding of these issues and agrees to the plan.  Reinforced healthy diet, lifestyle, and exercise.    Return in 1 year (on 8/7/2025).     Zita Andujar DO, 8/7/2024     Supplementary Documentation:   General Health:  In the past six months, have you lost more than 10 pounds without trying?: 2 - No  Has your appetite been poor?: No  Type of Diet: Other  How does the patient maintain a good energy level?: Stretching  How would you describe your daily physical activity?: Moderate  How would you describe your current health state?: Good  How do you maintain positive mental well-being?: Puzzles;Games;Visiting Friends  On a scale of 0 to 10, with 0 being no pain and 10 being severe pain, what is your pain level?: 1 - (Mild)  In the past six months, have you experienced urine leakage?: 0-No  At any time do you feel concerned for the safety/well-being of yourself and/or your children, in your home or elsewhere?: No  Have you had any immunizations at another office such as Influenza, Hepatitis B,  Tetanus, or Pneumococcal?: Yes    Health Maintenance   Topic Date Due    Colorectal Cancer Screening  04/26/2023    MA Annual Health Assessment  01/01/2024    Tobacco Cessation Counseling  01/01/2024    Influenza Vaccine (1) 10/01/2024    PSA  06/14/2025    Annual Depression Screening  Completed    Fall Risk Screening (Annual)  Completed    Pneumococcal Vaccine: 65+ Years  Completed    Zoster Vaccines  Completed    COVID-19 Vaccine  Completed     Lung Cancer Counseling and Shared Decision Making Session in an Asymptomatic Smoker/Former Smoker   Priyank Araujo is a 73 year old male without current symptoms of lung cancer.  History   Smoking Status    Every Day    Types: Cigarettes   Smokeless Tobacco    Never        He received information on the importance of adherence to annual lung cancer Low Dose CT (LDCT) screening, the impact of his comorbidities and his ability or willingness to undergo diagnosis and treatment. he is in agreement and an order will be placed for CT LUNG LD SCREENING(CPT=71271).    We counseled the importance of maintaining cigarette smoking abstinence if he is a former smoker and the importance of smoking cessation if he is a current smoker and we discussed and furnished information about tobacco cessation interventions.

## 2024-08-09 ENCOUNTER — PATIENT MESSAGE (OUTPATIENT)
Dept: FAMILY MEDICINE CLINIC | Facility: CLINIC | Age: 74
End: 2024-08-09

## 2024-08-10 ENCOUNTER — PATIENT MESSAGE (OUTPATIENT)
Dept: FAMILY MEDICINE CLINIC | Facility: CLINIC | Age: 74
End: 2024-08-10

## 2024-08-10 DIAGNOSIS — Z72.0 TOBACCO ABUSE: ICD-10-CM

## 2024-08-12 DIAGNOSIS — R73.09 ELEVATED HEMOGLOBIN A1C: Primary | ICD-10-CM

## 2024-08-12 NOTE — TELEPHONE ENCOUNTER
S/w pt on this while discussing result note.  He reports ins covers albuterol vs ventolin. RX was sent but sig is incorrect stating 2 puffs daily. Sig should say 2 puffs q 4 hr prn.    I have removed from med list and re-sent with corrected sig.    Pt aware.

## 2024-08-12 NOTE — TELEPHONE ENCOUNTER
From: Priyank Araujo  To: Zita Andujar  Sent: 8/9/2024 7:27 PM CDT  Subject: Notes - Cataract Extraction    Upon reading your notes you mentioned my Left eye was   done in June 2024, but forgot to mention me having my right eye Cataract removed in July 2024.  Ed

## 2024-08-12 NOTE — TELEPHONE ENCOUNTER
From: Priyank Araujo  To: Zita Andujar  Sent: 8/10/2024 1:48 PM CDT  Subject: Inhaler    Albuterol 108 (90 Base) MCG/ACT Aers  Commonly known as: Ventolin HFA   Thank You for Sending it in, BUT there is a problem with The directions and it needs clarification, Please call [facility] at [phone] it should read \"INHALE 2 PUFFS INTO THE LUNGS EVERY 4 (FOUR) HOURS AS NEEDED FOR WHEEZING )  Thank You  Ed

## 2024-08-14 ENCOUNTER — TELEPHONE (OUTPATIENT)
Dept: FAMILY MEDICINE CLINIC | Facility: CLINIC | Age: 74
End: 2024-08-14

## 2024-08-14 NOTE — TELEPHONE ENCOUNTER
Spoke to [facility] they will fill generic proair HFA     Spoke to pt he was agreeable to change to generic.

## 2024-08-14 NOTE — TELEPHONE ENCOUNTER
Ref #[id number] - the proair inhaler is too expensive and pt does not like it according to pharmacy.  Asking to change to the generic proair HFA

## 2024-09-03 ENCOUNTER — OFFICE VISIT (OUTPATIENT)
Dept: NUTRITION | Facility: HOSPITAL | Age: 74
End: 2024-09-03
Attending: FAMILY MEDICINE
Payer: MEDICARE

## 2024-09-03 PROBLEM — R73.09 ELEVATED HEMOGLOBIN A1C: Status: ACTIVE | Noted: 2024-09-03

## 2024-09-03 NOTE — PROGRESS NOTES
ADULT INITIAL OUTPATIENT NUTRITION CONSULTATION    Nutrition Assessment    Medical Diagnosis: Pre-DM, obesity    PMH: kidney stones, hemochromatosis, cataract surgery, smoker+    Client Hx: pleasant, talkative, 73 year old male    Meds: noted    Labs: A1C: 6.4%    ANTHROPOMETRICS:  Ht: 5'10\"  Wt: 257#    BMI: 36  AdjBW: 199#    Current Diet: high in processed foods and low in f&v    Food/Beverage Intake:   BREAKFAST: rajiv crackers, or leftover pizza or egg white omelet if out or raisin cookies  LUNCH: olive garden, red lobster, burger mark, buena beef  DINNER: leftovers from lunch or hot dogs or browns chicken  SNACKS: rajiv crackers, pretzels, chocolates, grapes  BEVERAGES: 1-2c coffee/dy, water, rarely soda, juice sometimes, milk 2%    Meal pattern: 2-3 meals/d, 1 snacks/d    Number of meals/week eaten at restaurants: daily    Alcohol Intake:  none    Diet Quality: needs Improvement    Estimated nutrition needs: 5752-3560 cals/d    Physical Activity: some walking, knee issues   GOAL: 150+ min/wk    Sleep: 5-7 hrs/d, uses CPAP    Stress/anxiety: high-about health concerns, insurance, etc  Coping mechanisms reviewed and encouraged, pt smoking 1.25 packs/dy    Nutrition Diagnosis: Food and Nutrition related knowledge deficit related to lack of previous diet education by RD as evidence by pt need for education regarding pre-DM and weight loss management.     Nutrition Intervention/Education: Comprehensive nutrition education and evaluation provided for pre-DM and weight loss management. Pt reports several medical conditions making it difficult for choosing foods to eat. Pt with hx of kidney stones, hemochromatosis, and obesity. A1C noted at 6.4%. Pt admits to daily dining out and fast food, does not cook at home. Discussed several easy to prepare meals and encouraged meal delivery programs. 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 Reviewed label reading, avoiding added sugars and processed foods. Encouraged healthy  choices when dining out. Provided lists of foods to choose and encouraged Variety with portion control. Written materials were issued and explained, all questions answered today. Activity encouraged, pt aware to decrease smoking. Pt has set goals to follow recommendations set today and to contact RD with further questions or concerns.           Monitoring/Evaluation:  Pt to follow with MD for labs, weight, and meds.   RD available prn.     Time spent with patient: 2 hrs    Thank you for the referral,    Stacey Simon RD, LDN  [email or web address]  P: [phone]

## 2024-09-05 ENCOUNTER — OFFICE VISIT (OUTPATIENT)
Dept: PODIATRY CLINIC | Facility: CLINIC | Age: 74
End: 2024-09-05

## 2024-09-05 DIAGNOSIS — L60.0 ONYCHOCRYPTOSIS: Primary | ICD-10-CM

## 2024-09-05 DIAGNOSIS — N18.31 STAGE 3A CHRONIC KIDNEY DISEASE (HCC): ICD-10-CM

## 2024-09-05 DIAGNOSIS — B35.1 ONYCHOMYCOSIS: ICD-10-CM

## 2024-09-05 PROCEDURE — 99213 OFFICE O/P EST LOW 20 MIN: CPT | Performed by: PODIATRIST

## 2024-09-05 PROCEDURE — 1159F MED LIST DOCD IN RCRD: CPT | Performed by: PODIATRIST

## 2024-09-05 NOTE — PROGRESS NOTES
Priyank Araujo is a 73 year old male.   Chief Complaint   Patient presents with    Toenail Care     F/u Toenail Care and  left 5th toe  skin tear onset 2 weeks ago, does not know cause. Intermittent pain 1-2/10. Left foot itching using athletes foot spray.         HPI:   Patient returns to clinic for toenail care some history of chronic kidney disease in addition to that he recently had some pain in his fifth toe it is not hurting now.  He noticed a little scratch on it on the left foot.  At today's visit reviewed nurse's history as taken above, allergies medications and medical history as documented below.  All changes duly noted  Allergies: Cephalosporins and Keflex   Current Outpatient Medications   Medication Sig Dispense Refill    Albuterol Sulfate 108 (90 Base) MCG/ACT Inhalation Aerosol Powder, Breath Activated Inhale 2 puffs into the lungs every 4 (four) hours as needed. Please disregard the previous rx for 2 puffs daily 3 each 3    polypropylene glycol- 0.4-0.3 % Ophthalmic Solution Place 1 drop into the right eye as needed.      albuterol (VENTOLIN HFA) 108 (90 Base) MCG/ACT Inhalation Aero Soln Inhale 2 puffs into the lungs every 4 (four) hours as needed for Wheezing. 108 g 3    latanoprost 0.005 % Ophthalmic Solution       HYDROcodone-acetaminophen 5-325 MG Oral Tab Take 1 tablet by mouth every 4 (four) hours as needed for Pain.      losartan-hydroCHLOROthiazide 100-25 MG Oral Tab Take 1 tablet by mouth daily. 90 tablet 3    simvastatin 40 MG Oral Tab Take 1 tablet (40 mg total) by mouth nightly. 90 tablet 3    calcium carbonate-vitamin D 250-3.125 MG-MCG Oral Tab Take 1 tablet by mouth daily.      folic acid 800 MCG Oral Tab Take 1 tablet (800 mcg total) by mouth daily.      acetaminophen-codeine 300-30 MG Oral Tab       bacitracin 500 UNIT/GM External Ointment Apply topically as needed (for cold sores).      mupirocin 2 % External Ointment Apply to affected area three times daily for  7 days 1 g 0    Homeopathic Products (ARNICA) External Gel Apply topically as needed.        Lutein 20 MG Oral Cap Take 20 mg by mouth daily.      Vitamin D3, Cholecalciferol, 50 MCG (2000 UT) Oral Tab Take by mouth.        Pyridoxine HCl (VITAMIN B-6) 100 MG Oral Tab Take 10 tablets (1,000 mg total) by mouth twice a week.      Polyethyl Glycol-Propyl Glycol 0.4-0.3 % Ophthalmic Gel Apply to eye.        Aspirin (ASPIR-81 OR) Take 81 mg by mouth daily.      Coenzyme Q10 (COQ-10) 200 MG Oral Cap Take 100 mg by mouth daily.        Past Medical History:    Chronic obstructive pulmonary disease with acute exacerbation (HCC)    CPAP (continuous positive airway pressure) dependence    Hereditary hemochromatosis (HCC)    Influenza A (H1N1)    Kidney calculi    Meralgia paresthetica    Plantar fasciitis    Trigger finger      Past Surgical History:   Procedure Laterality Date    Anesth,surgery of shoulder  01/01/2004    lump left    Cataract Right     july 2024    Cataract extraction Left 06/2024    Melecio localization wire 1 site left (cpt=19281)  10/2020    Abscess formation, chronic inflammation    Melecio localization wire 1 site right (cpt=19281)  2000    Needle biopsy left  10/2020    Inflammatory exudate    Needle biopsy left  10/2020    LN    Other surgical history  09/01/1970    testis    Other surgical history  10/30/2020    I&D BREAST ABSCESS W/ ESH     Other surgical history  09/03/2021    groin cyst - ESH       Family History   Problem Relation Age of Onset    Other (heart failure) Father     Other (emphysema) Father     Diabetes Mother       Social History     Socioeconomic History    Marital status:    Tobacco Use    Smoking status: Every Day     Current packs/day: 1.00     Types: Cigarettes    Smokeless tobacco: Never   Vaping Use    Vaping status: Never Used   Substance and Sexual Activity    Alcohol use: No     Alcohol/week: 0.0 standard drinks of alcohol     Comment: social     Drug use: No   Other Topics  Concern    Caffeine Concern Yes    Exercise No           REVIEW OF SYSTEMS:   Today reviewed systens as documented below  GENERAL HEALTH: feels well otherwise  SKIN: Refer to exam below  RESPIRATORY: denies shortness of breath with exertion  CARDIOVASCULAR: denies chest pain on exertion  GI: denies abdominal pain and denies heartburn  NEURO: denies headaches    EXAM:   There were no vitals taken for this visit.  GENERAL: well developed, well nourished, in no apparent distress  EXTREMITIES:   1. Integument: His foot is warm and dry his toenails 1-5 are thickened dystrophic the medial lateral nail borders of the hallux are incurvated with hyperkeratotic impaction toe has slight erythema at the tip there is a small scratch adjacent to the nail is not draining is not infected.  There is no pain on palpation.   2. Vascular: Patient has palpable pulses dorsalis pedis posterior tibial bilateral they are symmetrical and equivocal with good cap return to the pedal digits   3. Neurologic: Patient has intact sensorium still feels pain sensation   4. Musculoskeletal: Deformities present has good muscle strength    ASSESSMENT AND PLAN:   Diagnoses and all orders for this visit:    Onychocryptosis    Onychomycosis    Stage 3a chronic kidney disease (HCC)        Plan: Today using a nail nippers were trimmed and debrided toenails 1-5 manually and mechanically in girth and width as far down to healthy tissue as possible on both feet.  This was done uneventfully there was no hemorrhage.  There is mild incurvation of the medial and lateral nail borders of the hallux which using a slant back technique were removed and they would not get ingrown.   Advised patient again on smoking cessation.  He will consider it follow-up with us every 2 to 3 months for routine care I did not feel x-rays were necessary at today's visit because the fifth toe is not painful and has good alignment and is not deformed, left foot.    The patient indicates  understanding of these issues and agrees to the plan.    Curtis Gutierrez, CHAROM

## 2024-11-05 LAB — AMB EXT OCCULT BLOOD RESULT: NEGATIVE

## 2024-11-19 ENCOUNTER — PATIENT MESSAGE (OUTPATIENT)
Dept: FAMILY MEDICINE CLINIC | Facility: CLINIC | Age: 74
End: 2024-11-19

## 2024-11-21 ENCOUNTER — OFFICE VISIT (OUTPATIENT)
Dept: PODIATRY CLINIC | Facility: CLINIC | Age: 74
End: 2024-11-21
Payer: COMMERCIAL

## 2024-11-21 DIAGNOSIS — N18.31 STAGE 3A CHRONIC KIDNEY DISEASE (HCC): ICD-10-CM

## 2024-11-21 DIAGNOSIS — L60.0 ONYCHOCRYPTOSIS: Primary | ICD-10-CM

## 2024-11-21 DIAGNOSIS — B35.1 ONYCHOMYCOSIS: ICD-10-CM

## 2024-11-21 PROCEDURE — 1159F MED LIST DOCD IN RCRD: CPT | Performed by: PODIATRIST

## 2024-11-21 PROCEDURE — 99213 OFFICE O/P EST LOW 20 MIN: CPT | Performed by: PODIATRIST

## 2024-11-21 NOTE — PROGRESS NOTES
Priyank Araujo is a 74 year old male.   Chief Complaint   Patient presents with    Toenail Care     Pt here for nail trim and foot check          HPI:   Patient returns to clinic for toenail care some history of chronic kidney disease in addition to that he recently had some pain in his fifth toe it is not hurting now.  He noticed a little scratch on it on the left foot.  At today's visit reviewed nurse's history as taken above, allergies medications and medical history as documented below.  All changes duly noted  Allergies: Cephalosporins and Keflex   Current Outpatient Medications   Medication Sig Dispense Refill    Albuterol Sulfate 108 (90 Base) MCG/ACT Inhalation Aerosol Powder, Breath Activated Inhale 2 puffs into the lungs every 4 (four) hours as needed. Please disregard the previous rx for 2 puffs daily 3 each 3    polypropylene glycol- 0.4-0.3 % Ophthalmic Solution Place 1 drop into the right eye as needed.      albuterol (VENTOLIN HFA) 108 (90 Base) MCG/ACT Inhalation Aero Soln Inhale 2 puffs into the lungs every 4 (four) hours as needed for Wheezing. 108 g 3    latanoprost 0.005 % Ophthalmic Solution       HYDROcodone-acetaminophen 5-325 MG Oral Tab Take 1 tablet by mouth every 4 (four) hours as needed for Pain.      losartan-hydroCHLOROthiazide 100-25 MG Oral Tab Take 1 tablet by mouth daily. 90 tablet 3    simvastatin 40 MG Oral Tab Take 1 tablet (40 mg total) by mouth nightly. 90 tablet 3    calcium carbonate-vitamin D 250-3.125 MG-MCG Oral Tab Take 1 tablet by mouth daily.      folic acid 800 MCG Oral Tab Take 1 tablet (800 mcg total) by mouth daily.      acetaminophen-codeine 300-30 MG Oral Tab       bacitracin 500 UNIT/GM External Ointment Apply topically as needed (for cold sores).      mupirocin 2 % External Ointment Apply to affected area three times daily for 7 days 1 g 0    Homeopathic Products (ARNICA) External Gel Apply topically as needed.        Lutein 20 MG Oral Cap Take  20 mg by mouth daily.      Vitamin D3, Cholecalciferol, 50 MCG (2000 UT) Oral Tab Take by mouth.        Pyridoxine HCl (VITAMIN B-6) 100 MG Oral Tab Take 10 tablets (1,000 mg total) by mouth twice a week.      Polyethyl Glycol-Propyl Glycol 0.4-0.3 % Ophthalmic Gel Apply to eye.        Aspirin (ASPIR-81 OR) Take 81 mg by mouth daily.      Coenzyme Q10 (COQ-10) 200 MG Oral Cap Take 100 mg by mouth daily.        Past Medical History:    Chronic obstructive pulmonary disease with acute exacerbation (HCC)    CPAP (continuous positive airway pressure) dependence    Hereditary hemochromatosis (HCC)    Influenza A (H1N1)    Kidney calculi    Meralgia paresthetica    Plantar fasciitis    Trigger finger      Past Surgical History:   Procedure Laterality Date    Anesth,surgery of shoulder  01/01/2004    lump left    Cataract Right     july 2024    Cataract extraction Left 06/2024    Melecio localization wire 1 site left (cpt=19281)  10/2020    Abscess formation, chronic inflammation    Melecio localization wire 1 site right (cpt=19281)  2000    Needle biopsy left  10/2020    Inflammatory exudate    Needle biopsy left  10/2020    LN    Other surgical history  09/01/1970    testis    Other surgical history  10/30/2020    I&D BREAST ABSCESS W/ ESH     Other surgical history  09/03/2021    groin cyst - ESH       Family History   Problem Relation Age of Onset    Other (heart failure) Father     Other (emphysema) Father     Diabetes Mother       Social History     Socioeconomic History    Marital status:    Tobacco Use    Smoking status: Every Day     Current packs/day: 1.00     Types: Cigarettes    Smokeless tobacco: Never   Vaping Use    Vaping status: Never Used   Substance and Sexual Activity    Alcohol use: No     Alcohol/week: 0.0 standard drinks of alcohol     Comment: social     Drug use: No   Other Topics Concern    Caffeine Concern Yes    Exercise No           REVIEW OF SYSTEMS:   Today reviewed systens as documented  below  GENERAL HEALTH: feels well otherwise  SKIN: Refer to exam below  RESPIRATORY: denies shortness of breath with exertion  CARDIOVASCULAR: denies chest pain on exertion  GI: denies abdominal pain and denies heartburn  NEURO: denies headaches    EXAM:   There were no vitals taken for this visit.  GENERAL: well developed, well nourished, in no apparent distress  EXTREMITIES:   1. Integument: His foot is warm and dry his toenails 1-5 are thickened dystrophic the medial lateral nail borders of the hallux are incurvated with hyperkeratotic impaction toe has slight erythema at the tip there is a small scratch adjacent to the nail is not draining is not infected.  There is no pain on palpation.   2. Vascular: Patient has palpable pulses dorsalis pedis posterior tibial bilateral they are symmetrical and equivocal with good cap return to the pedal digits   3. Neurologic: Patient has intact sensorium still feels pain sensation   4. Musculoskeletal: Deformities present has good muscle strength    ASSESSMENT AND PLAN:   Diagnoses and all orders for this visit:    Onychocryptosis    Onychomycosis    Stage 3a chronic kidney disease (HCC)        Plan: Today using a nail nippers were trimmed and debrided toenails 1-5 manually and mechanically in girth and width as far down to healthy tissue as possible on both feet.  This was done uneventfully there was no hemorrhage.  There is mild incurvation of the medial and lateral nail borders of the hallux which using a slant back technique were removed and they would not get ingrown.   Advised patient again on smoking cessation.  He will consider it follow-up with us every 2 to 3 months for routine care    Patient is still smoking approximately 1 pack of cigarettes per day I again discussed with him the benefits of smoking cessation and to consider consulting his PCP..    The patient indicates understanding of these issues and agrees to the plan.    Curtis Gutierrez DPM

## 2024-12-05 ENCOUNTER — OFFICE VISIT (OUTPATIENT)
Dept: HEMATOLOGY/ONCOLOGY | Facility: HOSPITAL | Age: 74
End: 2024-12-05
Attending: INTERNAL MEDICINE
Payer: MEDICARE

## 2024-12-05 VITALS
SYSTOLIC BLOOD PRESSURE: 129 MMHG | OXYGEN SATURATION: 97 % | TEMPERATURE: 98 F | WEIGHT: 250.38 LBS | HEART RATE: 95 BPM | RESPIRATION RATE: 18 BRPM | BODY MASS INDEX: 35.84 KG/M2 | HEIGHT: 70 IN | DIASTOLIC BLOOD PRESSURE: 77 MMHG

## 2024-12-05 DIAGNOSIS — E83.110 HEMOCHROMATOSIS, HEREDITARY (HCC): Primary | ICD-10-CM

## 2024-12-05 DIAGNOSIS — J44.9 CHRONIC OBSTRUCTIVE PULMONARY DISEASE, UNSPECIFIED COPD TYPE (HCC): ICD-10-CM

## 2024-12-05 DIAGNOSIS — Z71.6 ENCOUNTER FOR TOBACCO USE CESSATION COUNSELING: ICD-10-CM

## 2024-12-05 LAB
ALBUMIN SERPL-MCNC: 4.5 G/DL (ref 3.2–4.8)
ALBUMIN/GLOB SERPL: 1.3 {RATIO} (ref 1–2)
ALP LIVER SERPL-CCNC: 47 U/L
ALT SERPL-CCNC: 28 U/L
ANION GAP SERPL CALC-SCNC: 11 MMOL/L (ref 0–18)
AST SERPL-CCNC: 26 U/L (ref ?–34)
BASOPHILS # BLD AUTO: 0.05 X10(3) UL (ref 0–0.2)
BASOPHILS NFR BLD AUTO: 0.6 %
BILIRUB SERPL-MCNC: 0.5 MG/DL (ref 0.2–1.1)
BUN BLD-MCNC: 30 MG/DL (ref 9–23)
CALCIUM BLD-MCNC: 10 MG/DL (ref 8.7–10.4)
CHLORIDE SERPL-SCNC: 101 MMOL/L (ref 98–112)
CO2 SERPL-SCNC: 24 MMOL/L (ref 21–32)
CREAT BLD-MCNC: 1.65 MG/DL
DEPRECATED HBV CORE AB SER IA-ACNC: 94 NG/ML
EGFRCR SERPLBLD CKD-EPI 2021: 44 ML/MIN/1.73M2 (ref 60–?)
EOSINOPHIL # BLD AUTO: 0.27 X10(3) UL (ref 0–0.7)
EOSINOPHIL NFR BLD AUTO: 3.1 %
ERYTHROCYTE [DISTWIDTH] IN BLOOD BY AUTOMATED COUNT: 12.8 %
GLOBULIN PLAS-MCNC: 3.4 G/DL (ref 2–3.5)
GLUCOSE BLD-MCNC: 119 MG/DL (ref 70–99)
HCT VFR BLD AUTO: 43.5 %
HGB BLD-MCNC: 15.3 G/DL
IMM GRANULOCYTES # BLD AUTO: 0.02 X10(3) UL (ref 0–1)
IMM GRANULOCYTES NFR BLD: 0.2 %
IRON SATN MFR SERPL: 35 %
IRON SERPL-MCNC: 108 UG/DL
LYMPHOCYTES # BLD AUTO: 2.44 X10(3) UL (ref 1–4)
LYMPHOCYTES NFR BLD AUTO: 28.2 %
MCH RBC QN AUTO: 33 PG (ref 26–34)
MCHC RBC AUTO-ENTMCNC: 35.2 G/DL (ref 31–37)
MCV RBC AUTO: 93.8 FL
MONOCYTES # BLD AUTO: 0.87 X10(3) UL (ref 0.1–1)
MONOCYTES NFR BLD AUTO: 10.1 %
NEUTROPHILS # BLD AUTO: 5 X10 (3) UL (ref 1.5–7.7)
NEUTROPHILS # BLD AUTO: 5 X10(3) UL (ref 1.5–7.7)
NEUTROPHILS NFR BLD AUTO: 57.8 %
OSMOLALITY SERPL CALC.SUM OF ELEC: 289 MOSM/KG (ref 275–295)
PLATELET # BLD AUTO: 158 10(3)UL (ref 150–450)
POTASSIUM SERPL-SCNC: 3.5 MMOL/L (ref 3.5–5.1)
PROT SERPL-MCNC: 7.9 G/DL (ref 5.7–8.2)
RBC # BLD AUTO: 4.64 X10(6)UL
SODIUM SERPL-SCNC: 136 MMOL/L (ref 136–145)
TOTAL IRON BINDING CAPACITY: 312 UG/DL (ref 250–425)
TRANSFERRIN SERPL-MCNC: 250 MG/DL (ref 215–365)
WBC # BLD AUTO: 8.7 X10(3) UL (ref 4–11)

## 2024-12-05 PROCEDURE — 99214 OFFICE O/P EST MOD 30 MIN: CPT | Performed by: INTERNAL MEDICINE

## 2024-12-05 PROCEDURE — G2211 COMPLEX E/M VISIT ADD ON: HCPCS | Performed by: INTERNAL MEDICINE

## 2024-12-05 PROCEDURE — 99406 BEHAV CHNG SMOKING 3-10 MIN: CPT | Performed by: INTERNAL MEDICINE

## 2024-12-05 NOTE — PROGRESS NOTES
Hematology/Oncology Clinic Follow Up Visit    Patient Name: Priyank Araujo  Medical Record Number: [id number]    YOB: 1950   PCP: Zita Andujar DO    Reason for Consultation:  Priyank Araujo was seen today for the diagnosis of hereditary hemochromatosis    History of Present Illness:      73 y/o M PMH hereditary hemochromatosis (homozygous H63D mutation) who presents for follow up.    - donated blood only twice in the last year; said medical issues such as cataract surgeries and tooth infections keep getting in the way  - otherwise feeling well, no issues today    Past Medical History:  Past Medical History:    Chronic obstructive pulmonary disease with acute exacerbation (HCC)    CPAP (continuous positive airway pressure) dependence    Hereditary hemochromatosis (HCC)    Influenza A (H1N1)    Kidney calculi    Meralgia paresthetica    Plantar fasciitis    Trigger finger     Past Surgical History:   Procedure Laterality Date    Anesth,surgery of shoulder  01/01/2004    lump left    Cataract Right     july 2024    Cataract extraction Left 06/2024    Melecio localization wire 1 site left (cpt=19281)  10/2020    Abscess formation, chronic inflammation    Melecio localization wire 1 site right (cpt=19281)  2000    Needle biopsy left  10/2020    Inflammatory exudate    Needle biopsy left  10/2020    LN    Other surgical history  09/01/1970    testis    Other surgical history  10/30/2020    I&D BREAST ABSCESS W/ ESH     Other surgical history  09/03/2021    groin cyst - ESH        Home Medications:  No outpatient medications have been marked as taking for the 12/5/24 encounter (Appointment) with Brandyn Henley MD.       Allergies:   Allergies   Allergen Reactions    Cephalosporins     Keflex RASH     CAPS       Psychosocial History:  Social History     Socioeconomic History    Marital status:      Spouse name: Not on file    Number of children: Not on file    Years of education: Not on  file    Highest education level: Not on file   Occupational History    Not on file   Tobacco Use    Smoking status: Every Day     Current packs/day: 1.00     Types: Cigarettes    Smokeless tobacco: Never   Vaping Use    Vaping status: Never Used   Substance and Sexual Activity    Alcohol use: No     Alcohol/week: 0.0 standard drinks of alcohol     Comment: social     Drug use: No    Sexual activity: Not on file   Other Topics Concern     Service Not Asked    Blood Transfusions Not Asked    Caffeine Concern Yes    Occupational Exposure Not Asked    Hobby Hazards Not Asked    Sleep Concern Not Asked    Stress Concern Not Asked    Weight Concern Not Asked    Special Diet Not Asked    Back Care Not Asked    Exercise No    Bike Helmet Not Asked    Seat Belt Not Asked    Self-Exams Not Asked   Social History Narrative    Not on file     Social Drivers of Health     Financial Resource Strain: Not on file   Food Insecurity: Not on file   Transportation Needs: Not on file   Physical Activity: Not on file   Stress: Not on file   Social Connections: Not on file   Housing Stability: Not on file       Family Medical History:  Family History   Problem Relation Age of Onset    Other (heart failure) Father     Other (emphysema) Father     Diabetes Mother        Review of Systems:  A 10-point ROS was done with pertinent positives and negative per the HPI    Vital Signs:  Height: --  Weight: --  BSA (Calculated - sq m): --  Pulse: --  BP: --  Temp: --  Do Not Use - Resp Rate: --  SpO2: --    Wt Readings from Last 6 Encounters:   08/07/24 116.6 kg (257 lb)   06/28/24 114.3 kg (252 lb)   05/30/24 116.6 kg (257 lb)   05/16/24 116.6 kg (257 lb)   12/07/23 117.8 kg (259 lb 12.8 oz)   05/12/23 119.7 kg (264 lb)       Physical Examination:  General: Patient is alert and oriented, not in acute distress  Psych: Mood and affect are appropriate  Eyes: EOMI, PERRL  ENT: Oropharynx is clear, no adenopathy  CV: no LE edema  Respiratory:  Non-labored respirations  GI/Abd: Soft, non-tender   Neurological: Grossly intact   LAD: No palpable lymphadenopathy  Skin: no rashes or petechiae    Laboratory:  Lab Results   Component Value Date    WBC 7.8 08/07/2024    WBC 6.9 12/07/2023    WBC 6.0 06/14/2023    HGB 14.9 08/07/2024    HGB 14.2 12/07/2023    HGB 14.1 06/14/2023    HCT 43.2 08/07/2024    MCV 95.4 08/07/2024    MCH 32.9 08/07/2024    MCHC 34.5 08/07/2024    RDW 13.1 08/07/2024    .0 08/07/2024    .0 (L) 12/07/2023    .0 06/14/2023     Lab Results   Component Value Date     (H) 08/07/2024    BUN 17 08/07/2024    BUNCREA 13.3 06/24/2021    CREATSERUM 1.27 08/07/2024    CREATSERUM 1.25 12/07/2023    CREATSERUM 1.23 06/14/2023    ANIONGAP 3 08/07/2024    GFR 58 (L) 10/27/2017    GFRNAA 56 (L) 05/25/2022    GFRAA 64 05/25/2022    CA 10.6 (H) 08/07/2024    OSMOCALC 280 08/07/2024    ALKPHO 51 08/07/2024    AST 24 08/07/2024    ALT 27 08/07/2024    BILT 0.8 08/07/2024    TP 8.4 (H) 08/07/2024    ALB 4.8 08/07/2024    GLOBULIN 3.6 (H) 08/07/2024    AGRATIO 1.5 04/20/2011     (L) 08/07/2024    K 3.8 08/07/2024     08/07/2024    CO2 27.0 08/07/2024     No results found for: \"PTT\", \"PT\", \"INR\"    Impression & Plan:     Hereditary hemochromatosis  - homozygous H63D mutation, with no evidence of iron overload thus far  - continue therapeutic phlebotomy [id number] at [facility]    COPD  - on inhalers    Smoking  - he had a strong smell of cigarettes on him today. He admits to smoking since he was 18. 3 mins spent on tobacco cessation counseling. He was pre-contemplative. I asked him to think about quitting smoking for his health and vision.    Health maintenance  - previously discussed that iron deficiency is the intent via phlebotomy and that we would not be able to adequately detect a GI lesion without colonoscopy; pt declined    F/u: 1 year    Brandyn Henley  Hematology/Medical Oncology  [facility]

## 2024-12-05 NOTE — PROGRESS NOTES
Education Record    Learner:  Patient    Disease / Diagnosis: Hereditary Hemochromatosis    Barriers / Limitations:  None   Comments:    Method:  Discussion   Comments:    General Topics:  Plan of care reviewed   Comments:    Outcome:  Shows understanding   Comments:    Here for follow up. Has not been consistent with phlebotomies because of cataract surgery x2 and other things going on. He reports getting a phlebotomy last in August of this year.

## 2024-12-06 ENCOUNTER — PATIENT MESSAGE (OUTPATIENT)
Dept: FAMILY MEDICINE CLINIC | Facility: CLINIC | Age: 74
End: 2024-12-06

## 2025-02-03 ENCOUNTER — OFFICE VISIT (OUTPATIENT)
Dept: PODIATRY CLINIC | Facility: CLINIC | Age: 75
End: 2025-02-03

## 2025-02-03 DIAGNOSIS — B35.1 ONYCHOMYCOSIS: ICD-10-CM

## 2025-02-03 DIAGNOSIS — L60.0 ONYCHOCRYPTOSIS: ICD-10-CM

## 2025-02-03 DIAGNOSIS — N18.31 STAGE 3A CHRONIC KIDNEY DISEASE (HCC): Primary | ICD-10-CM

## 2025-02-03 PROCEDURE — 99213 OFFICE O/P EST LOW 20 MIN: CPT | Performed by: PODIATRIST

## 2025-02-03 PROCEDURE — 1159F MED LIST DOCD IN RCRD: CPT | Performed by: PODIATRIST

## 2025-02-03 PROCEDURE — 1126F AMNT PAIN NOTED NONE PRSNT: CPT | Performed by: PODIATRIST

## 2025-02-03 NOTE — PROGRESS NOTES
Priyank Araujo is a 74 year old male.   Chief Complaint   Patient presents with    Toenail Care     Pt in for f/u on nail care.         HPI:   Returns to the clinic for toenail care.  Evaluation.  He is a history of kidney disease.  In addition to that there are no changes noted.  Reviewed his chart.  He cannot provide self-care and relates he is still smoking.  At today's visit reviewed nurse's history as taken above, allergies medications and medical history as documented below.  All changes duly noted  Allergies: Cephalosporins and Keflex   Current Outpatient Medications   Medication Sig Dispense Refill    diclofenac 1 % External Gel Apply topically 4 (four) times daily.      polypropylene glycol- 0.4-0.3 % Ophthalmic Solution Place 1 drop into the right eye as needed.      albuterol (VENTOLIN HFA) 108 (90 Base) MCG/ACT Inhalation Aero Soln Inhale 2 puffs into the lungs every 4 (four) hours as needed for Wheezing. 108 g 3    latanoprost 0.005 % Ophthalmic Solution Place into both eyes nightly.      HYDROcodone-acetaminophen 5-325 MG Oral Tab Take 1 tablet by mouth every 4 (four) hours as needed for Pain.      losartan-hydroCHLOROthiazide 100-25 MG Oral Tab Take 1 tablet by mouth daily. 90 tablet 3    simvastatin 40 MG Oral Tab Take 1 tablet (40 mg total) by mouth nightly. 90 tablet 3    calcium carbonate-vitamin D 250-3.125 MG-MCG Oral Tab Take 1 tablet by mouth daily.      folic acid 800 MCG Oral Tab Take 1 tablet (800 mcg total) by mouth daily.      acetaminophen-codeine 300-30 MG Oral Tab       bacitracin 500 UNIT/GM External Ointment Apply topically as needed (for cold sores).      mupirocin 2 % External Ointment Apply to affected area three times daily for 7 days 1 g 0    Homeopathic Products (ARNICA) External Gel Apply topically as needed.        Lutein 20 MG Oral Cap Take 20 mg by mouth daily.      Vitamin D3, Cholecalciferol, 50 MCG (2000 UT) Oral Tab Take by mouth.        Pyridoxine HCl  (VITAMIN B-6) 100 MG Oral Tab Take 10 tablets (1,000 mg total) by mouth twice a week.      Polyethyl Glycol-Propyl Glycol 0.4-0.3 % Ophthalmic Gel Apply to eye.        Aspirin (ASPIR-81 OR) Take 81 mg by mouth daily.      Coenzyme Q10 (COQ-10) 200 MG Oral Cap Take 100 mg by mouth daily.        Past Medical History:    Chronic obstructive pulmonary disease with acute exacerbation (HCC)    CPAP (continuous positive airway pressure) dependence    Hereditary hemochromatosis    Influenza A (H1N1)    Kidney calculi    Meralgia paresthetica    Plantar fasciitis    Trigger finger      Past Surgical History:   Procedure Laterality Date    Anesth,surgery of shoulder  01/01/2004    lump left    Cataract Right     july 2024    Cataract extraction Left 06/2024    Melecio localization wire 1 site left (cpt=19281)  10/2020    Abscess formation, chronic inflammation    Melecio localization wire 1 site right (cpt=19281)  2000    Needle biopsy left  10/2020    Inflammatory exudate    Needle biopsy left  10/2020    LN    Other surgical history  09/01/1970    testis    Other surgical history  10/30/2020    I&D BREAST ABSCESS W/ ESH     Other surgical history  09/03/2021    groin cyst - ESH       Family History   Problem Relation Age of Onset    Other (heart failure) Father     Other (emphysema) Father     Diabetes Mother       Social History     Socioeconomic History    Marital status:    Tobacco Use    Smoking status: Every Day     Current packs/day: 1.00     Types: Cigarettes    Smokeless tobacco: Never   Vaping Use    Vaping status: Never Used   Substance and Sexual Activity    Alcohol use: No     Alcohol/week: 0.0 standard drinks of alcohol     Comment: social     Drug use: No   Other Topics Concern    Caffeine Concern Yes    Exercise No           REVIEW OF SYSTEMS:   Today reviewed systens as documented below  GENERAL HEALTH: feels well otherwise  SKIN: Refer to exam below  RESPIRATORY: denies shortness of breath with  exertion  CARDIOVASCULAR: denies chest pain on exertion  GI: denies abdominal pain and denies heartburn  NEURO: denies headaches    EXAM:   There were no vitals taken for this visit.  GENERAL: well developed, well nourished, in no apparent distress  EXTREMITIES:   1. Integument: His foot is warm and dry his toenails 1-5 are thickened dystrophic the medial lateral nail borders of the hallux are incurvated with hyperkeratotic impaction toe has slight erythema at the tip there is a small scratch adjacent to the nail is not draining is not infected.  There is no pain on palpation.   2. Vascular: Patient has palpable pulses dorsalis pedis posterior tibial bilateral they are symmetrical and equivocal with good cap return to the pedal digits   3. Neurologic: Patient has intact sensorium still feels pain sensation   4. Musculoskeletal: Deformities present has good muscle strength    ASSESSMENT AND PLAN:   Diagnoses and all orders for this visit:    Stage 3a chronic kidney disease (HCC)    Onychocryptosis    Onychomycosis        Plan: Today using a nail nippers were trimmed and debrided toenails 1-5 manually and mechanically in girth and width as far down to healthy tissue as possible on both feet.  This was done uneventfully there was no hemorrhage.  There is mild incurvation of the medial and lateral nail borders of the hallux which using a slant back technique were removed and they would not get ingrown.   Advised patient again on smoking cessation.  He will consider it follow-up with us every 2 to 3 months for routine care    Patient is still smoking approximately 1 pack of cigarettes per day I again discussed with him the benefits of smoking cessation and to consider consulting his PCP..    The patient indicates understanding of these issues and agrees to the plan.    Curtis Gutierrez DPM

## 2025-04-10 ENCOUNTER — OFFICE VISIT (OUTPATIENT)
Dept: PODIATRY CLINIC | Facility: CLINIC | Age: 75
End: 2025-04-10
Payer: COMMERCIAL

## 2025-04-10 DIAGNOSIS — N18.31 STAGE 3A CHRONIC KIDNEY DISEASE (HCC): Primary | ICD-10-CM

## 2025-04-10 DIAGNOSIS — L60.0 ONYCHOCRYPTOSIS: ICD-10-CM

## 2025-04-10 DIAGNOSIS — B35.1 ONYCHOMYCOSIS: ICD-10-CM

## 2025-04-10 PROBLEM — E83.110 HEMOCHROMATOSIS, HEREDITARY: Chronic | Status: RESOLVED | Noted: 2022-11-21 | Resolved: 2024-08-07

## 2025-04-10 PROCEDURE — 99213 OFFICE O/P EST LOW 20 MIN: CPT | Performed by: PODIATRIST

## 2025-04-10 PROCEDURE — 1159F MED LIST DOCD IN RCRD: CPT | Performed by: PODIATRIST

## 2025-04-10 NOTE — PROGRESS NOTES
Priyank Araujo is a 74 year old male.   Chief Complaint   Patient presents with    Toenail Care     Nail trim and foot check f/u- last A1c=6.4 on 8/07/2024-  also was LOV w/ PCP Dr. Nava -per pt he's pre-diabetic         HPI:   Patient with a known history of chronic kidney disease presents for routine care.  He has no complaints of pain or discomfort his last A1c is 6.4 showing good control.  At today's visit reviewed nurse's history as taken above, allergies medications and medical history as documented below.  All changes duly noted  Allergies: Cephalosporins and Keflex   Current Outpatient Medications   Medication Sig Dispense Refill    diclofenac 1 % External Gel Apply topically 4 (four) times daily.      polypropylene glycol- 0.4-0.3 % Ophthalmic Solution Place 1 drop into the right eye as needed.      albuterol (VENTOLIN HFA) 108 (90 Base) MCG/ACT Inhalation Aero Soln Inhale 2 puffs into the lungs every 4 (four) hours as needed for Wheezing. 108 g 3    latanoprost 0.005 % Ophthalmic Solution Place into both eyes nightly.      HYDROcodone-acetaminophen 5-325 MG Oral Tab Take 1 tablet by mouth every 4 (four) hours as needed for Pain.      losartan-hydroCHLOROthiazide 100-25 MG Oral Tab Take 1 tablet by mouth daily. 90 tablet 3    simvastatin 40 MG Oral Tab Take 1 tablet (40 mg total) by mouth nightly. 90 tablet 3    calcium carbonate-vitamin D 250-3.125 MG-MCG Oral Tab Take 1 tablet by mouth daily.      folic acid 800 MCG Oral Tab Take 1 tablet (800 mcg total) by mouth daily.      acetaminophen-codeine 300-30 MG Oral Tab       bacitracin 500 UNIT/GM External Ointment Apply topically as needed (for cold sores).      mupirocin 2 % External Ointment Apply to affected area three times daily for 7 days 1 g 0    Homeopathic Products (ARNICA) External Gel Apply topically as needed.        Lutein 20 MG Oral Cap Take 20 mg by mouth daily.      Vitamin D3, Cholecalciferol, 50 MCG (2000 UT) Oral Tab  Take by mouth.        Pyridoxine HCl (VITAMIN B-6) 100 MG Oral Tab Take 10 tablets (1,000 mg total) by mouth twice a week.      Polyethyl Glycol-Propyl Glycol 0.4-0.3 % Ophthalmic Gel Apply to eye.        Aspirin (ASPIR-81 OR) Take 81 mg by mouth daily.      Coenzyme Q10 (COQ-10) 200 MG Oral Cap Take 100 mg by mouth daily.        Past Medical History:    Chronic obstructive pulmonary disease with acute exacerbation (HCC)    CPAP (continuous positive airway pressure) dependence    Hereditary hemochromatosis    Influenza A (H1N1)    Kidney calculi    Meralgia paresthetica    Plantar fasciitis    Trigger finger      Past Surgical History:   Procedure Laterality Date    Anesth,surgery of shoulder  01/01/2004    lump left    Cataract Right     july 2024    Cataract extraction Left 06/2024    Melecio localization wire 1 site left (cpt=19281)  10/2020    Abscess formation, chronic inflammation    Melecio localization wire 1 site right (cpt=19281)  2000    Needle biopsy left  10/2020    Inflammatory exudate    Needle biopsy left  10/2020    LN    Other surgical history  09/01/1970    testis    Other surgical history  10/30/2020    I&D BREAST ABSCESS W/ ESH     Other surgical history  09/03/2021    groin cyst - [facility]       Family History   Problem Relation Age of Onset    Other (heart failure) Father     Other (emphysema) Father     Diabetes Mother       Social History     Socioeconomic History    Marital status:    Tobacco Use    Smoking status: Every Day     Current packs/day: 1.00     Types: Cigarettes    Smokeless tobacco: Never   Vaping Use    Vaping status: Never Used   Substance and Sexual Activity    Alcohol use: No     Alcohol/week: 0.0 standard drinks of alcohol     Comment: social     Drug use: No   Other Topics Concern    Caffeine Concern Yes    Exercise No           REVIEW OF SYSTEMS:   Today reviewed systens as documented below  GENERAL HEALTH: feels well otherwise  SKIN: Refer to exam below  RESPIRATORY: denies  shortness of breath with exertion  CARDIOVASCULAR: denies chest pain on exertion  GI: denies abdominal pain and denies heartburn  NEURO: denies headaches    EXAM:   There were no vitals taken for this visit.  GENERAL: well developed, well nourished, in no apparent distress  EXTREMITIES:   1. Integument: His foot is warm and dry his toenails 1-5 are thickened dystrophic the medial lateral nail borders of the hallux are incurvated with hyperkeratotic impaction toe has slight erythema at the tip there is a small scratch adjacent to the nail is not draining is not infected.  There is no pain on palpation.   2. Vascular: Patient has palpable pulses dorsalis pedis posterior tibial bilateral they are symmetrical and equivocal with good cap return to the pedal digits   3. Neurologic: Patient has intact sensorium still feels pain sensation   4. Musculoskeletal: Deformities present has good muscle strength    ASSESSMENT AND PLAN:   Diagnoses and all orders for this visit:    Stage 3a chronic kidney disease (HCC)    Onychocryptosis    Onychomycosis        Plan: Today using a nail nippers were trimmed and debrided toenails 1-5 manually and mechanically in girth and width as far down to healthy tissue as possible on both feet.  This was done uneventfully there was no hemorrhage.  There is mild incurvation of the medial and lateral nail borders of the hallux which using a slant back technique were removed and they would not get ingrown.   Advised patient again on smoking cessation.  He will consider it follow-up with us every 2 to 3 months for routine care    Patient is still smoking approximately 1 pack of cigarettes per day I again discussed with him the benefits of smoking cessation and to consider consulting his PCP..    The patient indicates understanding of these issues and agrees to the plan.    Curtis Gutierrez DPM

## 2025-04-30 DIAGNOSIS — E78.00 PURE HYPERCHOLESTEROLEMIA: ICD-10-CM

## 2025-04-30 NOTE — TELEPHONE ENCOUNTER
A refill request was received for:  Requested Prescriptions     Pending Prescriptions Disp Refills    LOSARTAN-HYDROCHLOROTHIAZIDE 100-25 MG Oral Tab [Pharmacy Med Name: Losartan Potassium-HCTZ 100-25 MG Oral Tablet] 90 tablet 3     Sig: TAKE 1 TABLET BY MOUTH DAILY    SIMVASTATIN 40 MG Oral Tab [Pharmacy Med Name: Simvastatin 40 MG Oral Tablet] 90 tablet 3     Sig: TAKE 1 TABLET BY MOUTH EVERY  NIGHT       Last refill date:     Losartan hydrochlorothiazide 4/13/24  Simvastatin 4/13/24      Last office visit: 8/7/24    Follow up due: 8/7/25  Future Appointments   Date Time Provider Department Center   6/13/2025 10:00 AM Zita Andujar DO [facility] [address]   6/26/2025 10:30 AM Curtis Gutierrez, [id number]

## 2025-05-01 RX ORDER — SIMVASTATIN 40 MG
40 TABLET ORAL NIGHTLY
Qty: 90 TABLET | Refills: 0 | Status: SHIPPED | OUTPATIENT
Start: 2025-05-01

## 2025-05-01 RX ORDER — LOSARTAN POTASSIUM AND HYDROCHLOROTHIAZIDE 25; 100 MG/1; MG/1
1 TABLET ORAL DAILY
Qty: 90 TABLET | Refills: 0 | Status: SHIPPED | OUTPATIENT
Start: 2025-05-01

## 2025-05-19 DIAGNOSIS — E78.00 PURE HYPERCHOLESTEROLEMIA: ICD-10-CM

## 2025-05-19 RX ORDER — LOSARTAN POTASSIUM AND HYDROCHLOROTHIAZIDE 25; 100 MG/1; MG/1
1 TABLET ORAL DAILY
Qty: 90 TABLET | Refills: 0 | Status: SHIPPED | OUTPATIENT
Start: 2025-05-19

## 2025-05-19 RX ORDER — SIMVASTATIN 40 MG
40 TABLET ORAL NIGHTLY
Qty: 90 TABLET | Refills: 0 | Status: SHIPPED | OUTPATIENT
Start: 2025-05-19

## 2025-05-19 NOTE — TELEPHONE ENCOUNTER
A refill request was received for:  Requested Prescriptions     Pending Prescriptions Disp Refills    losartan-hydroCHLOROthiazide 100-25 MG Oral Tab 90 tablet 0     Sig: Take 1 tablet by mouth daily.    simvastatin 40 MG Oral Tab 90 tablet 0     Sig: Take 1 tablet (40 mg total) by mouth nightly.       Last refill date:   5/1/25 (Both)    Last office visit: 8/7/24    Follow up due:  Future Appointments   Date Time Provider Department Center   6/13/2025 10:00 AM Zita Andujar DO [facility] [address]   6/26/2025 10:30 AM Curtis Gutierrez DPM [id number]   12/5/2025 10:30 AM Brandyn Henley MD NP [facility]

## 2025-06-13 ENCOUNTER — OFFICE VISIT (OUTPATIENT)
Dept: FAMILY MEDICINE CLINIC | Facility: CLINIC | Age: 75
End: 2025-06-13
Payer: COMMERCIAL

## 2025-06-13 ENCOUNTER — LAB ENCOUNTER (OUTPATIENT)
Dept: LAB | Age: 75
End: 2025-06-13
Attending: FAMILY MEDICINE
Payer: MEDICARE

## 2025-06-13 VITALS
OXYGEN SATURATION: 96 % | HEART RATE: 90 BPM | WEIGHT: 253 LBS | BODY MASS INDEX: 36.22 KG/M2 | DIASTOLIC BLOOD PRESSURE: 62 MMHG | SYSTOLIC BLOOD PRESSURE: 116 MMHG | RESPIRATION RATE: 20 BRPM | HEIGHT: 70 IN

## 2025-06-13 DIAGNOSIS — R73.9 HYPERGLYCEMIA: ICD-10-CM

## 2025-06-13 DIAGNOSIS — M25.552 HIP PAIN, BILATERAL: ICD-10-CM

## 2025-06-13 DIAGNOSIS — Z12.5 SCREENING PSA (PROSTATE SPECIFIC ANTIGEN): ICD-10-CM

## 2025-06-13 DIAGNOSIS — E66.01 SEVERE OBESITY (BMI 35.0-39.9) WITH COMORBIDITY (HCC): Chronic | ICD-10-CM

## 2025-06-13 DIAGNOSIS — Z87.891 PERSONAL HISTORY OF TOBACCO USE, PRESENTING HAZARDS TO HEALTH: ICD-10-CM

## 2025-06-13 DIAGNOSIS — E55.9 VITAMIN D DEFICIENCY: ICD-10-CM

## 2025-06-13 DIAGNOSIS — R53.83 OTHER FATIGUE: ICD-10-CM

## 2025-06-13 DIAGNOSIS — Q98.4 KLINEFELTER SYNDROME (HCC): ICD-10-CM

## 2025-06-13 DIAGNOSIS — E78.00 PURE HYPERCHOLESTEROLEMIA: ICD-10-CM

## 2025-06-13 DIAGNOSIS — Z12.2 SCREENING FOR LUNG CANCER: ICD-10-CM

## 2025-06-13 DIAGNOSIS — Z00.00 ENCOUNTER FOR ANNUAL HEALTH EXAMINATION: Primary | ICD-10-CM

## 2025-06-13 DIAGNOSIS — G47.33 OSA ON CPAP: ICD-10-CM

## 2025-06-13 DIAGNOSIS — D69.6 THROMBOCYTOPENIA: Chronic | ICD-10-CM

## 2025-06-13 DIAGNOSIS — M25.551 HIP PAIN, BILATERAL: ICD-10-CM

## 2025-06-13 DIAGNOSIS — N18.32 STAGE 3B CHRONIC KIDNEY DISEASE (HCC): Chronic | ICD-10-CM

## 2025-06-13 DIAGNOSIS — E83.110 HEMOCHROMATOSIS ASSOCIATED WITH COMPOUND HETEROZYGOUS MUTATION IN HFE GENE: ICD-10-CM

## 2025-06-13 PROBLEM — R73.09 ELEVATED HEMOGLOBIN A1C: Status: RESOLVED | Noted: 2024-09-03 | Resolved: 2025-06-13

## 2025-06-13 LAB
ALBUMIN SERPL-MCNC: 5 G/DL (ref 3.2–4.8)
ALBUMIN/GLOB SERPL: 1.5 {RATIO} (ref 1–2)
ALP LIVER SERPL-CCNC: 47 U/L (ref 45–117)
ALT SERPL-CCNC: 26 U/L (ref 10–49)
ANION GAP SERPL CALC-SCNC: 11 MMOL/L (ref 0–18)
AST SERPL-CCNC: 29 U/L (ref ?–34)
BASOPHILS # BLD AUTO: 0.04 X10(3) UL (ref 0–0.2)
BASOPHILS NFR BLD AUTO: 0.5 %
BILIRUB SERPL-MCNC: 0.7 MG/DL (ref 0.2–1.1)
BUN BLD-MCNC: 19 MG/DL (ref 9–23)
CALCIUM BLD-MCNC: 9.9 MG/DL (ref 8.7–10.6)
CHLORIDE SERPL-SCNC: 105 MMOL/L (ref 98–112)
CHOLEST SERPL-MCNC: 117 MG/DL (ref ?–200)
CO2 SERPL-SCNC: 24 MMOL/L (ref 21–32)
COMPLEXED PSA SERPL-MCNC: 1.73 NG/ML (ref ?–4)
CREAT BLD-MCNC: 1.35 MG/DL (ref 0.7–1.3)
EGFRCR SERPLBLD CKD-EPI 2021: 56 ML/MIN/1.73M2 (ref 60–?)
EOSINOPHIL # BLD AUTO: 0.26 X10(3) UL (ref 0–0.7)
EOSINOPHIL NFR BLD AUTO: 3.5 %
ERYTHROCYTE [DISTWIDTH] IN BLOOD BY AUTOMATED COUNT: 13.1 %
EST. AVERAGE GLUCOSE BLD GHB EST-MCNC: 126 MG/DL (ref 68–126)
FASTING PATIENT LIPID ANSWER: YES
FASTING STATUS PATIENT QL REPORTED: YES
GLOBULIN PLAS-MCNC: 3.3 G/DL (ref 2–3.5)
GLUCOSE BLD-MCNC: 106 MG/DL (ref 70–99)
HBA1C MFR BLD: 6 % (ref ?–5.7)
HCT VFR BLD AUTO: 40.6 % (ref 39–53)
HDLC SERPL-MCNC: 48 MG/DL (ref 40–59)
HGB BLD-MCNC: 14.1 G/DL (ref 13–17.5)
IMM GRANULOCYTES # BLD AUTO: 0.02 X10(3) UL (ref 0–1)
IMM GRANULOCYTES NFR BLD: 0.3 %
LDLC SERPL CALC-MCNC: 52 MG/DL (ref ?–100)
LYMPHOCYTES # BLD AUTO: 2.09 X10(3) UL (ref 1–4)
LYMPHOCYTES NFR BLD AUTO: 28 %
MCH RBC QN AUTO: 33.2 PG (ref 26–34)
MCHC RBC AUTO-ENTMCNC: 34.7 G/DL (ref 31–37)
MCV RBC AUTO: 95.5 FL (ref 80–100)
MONOCYTES # BLD AUTO: 0.76 X10(3) UL (ref 0.1–1)
MONOCYTES NFR BLD AUTO: 10.2 %
NEUTROPHILS # BLD AUTO: 4.3 X10 (3) UL (ref 1.5–7.7)
NEUTROPHILS # BLD AUTO: 4.3 X10(3) UL (ref 1.5–7.7)
NEUTROPHILS NFR BLD AUTO: 57.5 %
NONHDLC SERPL-MCNC: 69 MG/DL (ref ?–130)
OSMOLALITY SERPL CALC.SUM OF ELEC: 293 MOSM/KG (ref 275–295)
PLATELET # BLD AUTO: 170 10(3)UL (ref 150–450)
POTASSIUM SERPL-SCNC: 3.9 MMOL/L (ref 3.5–5.1)
PROT SERPL-MCNC: 8.3 G/DL (ref 5.7–8.2)
RBC # BLD AUTO: 4.25 X10(6)UL (ref 3.8–5.8)
SODIUM SERPL-SCNC: 140 MMOL/L (ref 136–145)
T4 FREE SERPL-MCNC: 1.2 NG/DL (ref 0.8–1.7)
TRIGL SERPL-MCNC: 84 MG/DL (ref 30–149)
TSI SER-ACNC: 1.92 UIU/ML (ref 0.55–4.78)
VIT B12 SERPL-MCNC: 924 PG/ML (ref 211–911)
VIT D+METAB SERPL-MCNC: 52.6 NG/ML (ref 30–100)
VLDLC SERPL CALC-MCNC: 12 MG/DL (ref 0–30)
WBC # BLD AUTO: 7.5 X10(3) UL (ref 4–11)

## 2025-06-13 PROCEDURE — 80053 COMPREHEN METABOLIC PANEL: CPT

## 2025-06-13 PROCEDURE — 84439 ASSAY OF FREE THYROXINE: CPT

## 2025-06-13 PROCEDURE — 82607 VITAMIN B-12: CPT

## 2025-06-13 PROCEDURE — 84443 ASSAY THYROID STIM HORMONE: CPT

## 2025-06-13 PROCEDURE — 36415 COLL VENOUS BLD VENIPUNCTURE: CPT

## 2025-06-13 PROCEDURE — 80061 LIPID PANEL: CPT

## 2025-06-13 PROCEDURE — 85025 COMPLETE CBC W/AUTO DIFF WBC: CPT

## 2025-06-13 PROCEDURE — 83036 HEMOGLOBIN GLYCOSYLATED A1C: CPT

## 2025-06-13 PROCEDURE — 82306 VITAMIN D 25 HYDROXY: CPT

## 2025-06-13 RX ORDER — LOSARTAN POTASSIUM AND HYDROCHLOROTHIAZIDE 25; 100 MG/1; MG/1
1 TABLET ORAL DAILY
Qty: 90 TABLET | Refills: 0 | Status: SHIPPED | OUTPATIENT
Start: 2025-06-13

## 2025-06-13 RX ORDER — SIMVASTATIN 40 MG
40 TABLET ORAL NIGHTLY
Qty: 90 TABLET | Refills: 0 | Status: SHIPPED | OUTPATIENT
Start: 2025-06-13

## 2025-06-13 NOTE — PROGRESS NOTES
Subjective:   Priyank Araujo is a 74 year old male who presents for a MA AHA (Medicare Advantage Annual Health Assessment) and scheduled follow up of multiple significant but stable problems.   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      Cognitive Assessment:   He had a completely normal cognitive assessment - see flowsheet entries     Functional Ability/Status:   Priyank Araujo has some abnormal functions as listed below:  He has Hearing problems based on screening of functional status.He has Vision problems based on screening of functional status. He has Walking problems based on screening of functional status. He has problems with Daily Activities based on screening of functional status.       Depression Screening (PHQ):  PHQ-2 SCORE: 0  , done 6/6/2025         Advanced Directives:   He does have a Living Will but we do NOT have it on file in Epic.    He does have a POA but we do NOT have it on file in Epic.    Discussed Advance Care Planning with patient (and family/surrogate if present). Standard forms made available to patient in After Visit Summary.      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    Current Medications:  Outpatient Medications Marked as Taking for the 6/13/25 encounter (Office Visit) with Zita Andujar, DO   Medication Sig    losartan-hydroCHLOROthiazide 100-25 MG Oral Tab Take 1 tablet by mouth daily.    simvastatin 40 MG Oral Tab Take 1 tablet (40 mg total) by mouth nightly.    diclofenac 1 % External Gel Apply topically 4 (four) times daily.    polypropylene glycol- 0.4-0.3 % Ophthalmic Solution Place 1 drop into the right eye as needed.    albuterol (VENTOLIN HFA) 108 (90 Base) MCG/ACT Inhalation Aero Soln Inhale 2 puffs into the lungs every 4 (four) hours as needed for Wheezing.    latanoprost 0.005 % Ophthalmic Solution Place into both eyes nightly.    HYDROcodone-acetaminophen 5-325 MG Oral Tab Take 1 tablet by mouth every 4 (four) hours as needed for Pain.    calcium carbonate-vitamin D 250-3.125 MG-MCG Oral Tab Take 1 tablet by mouth daily.    folic acid 800 MCG Oral Tab Take 1 tablet (800 mcg total) by mouth daily.    acetaminophen-codeine 300-30 MG Oral Tab     bacitracin 500 UNIT/GM External Ointment Apply topically as needed (for cold sores).    mupirocin 2 % External Ointment Apply to affected area three times daily for 7 days    Homeopathic Products (ARNICA) External Gel Apply topically as needed.      Lutein 20 MG Oral Cap Take 20 mg by mouth daily.    Vitamin D3, Cholecalciferol, 50 MCG (2000 UT) Oral Tab Take by mouth.      Pyridoxine HCl (VITAMIN B-6) 100 MG Oral Tab Take 10 tablets (1,000 mg total) by mouth twice a week.    Polyethyl Glycol-Propyl Glycol 0.4-0.3 % Ophthalmic Gel Apply to eye.      Aspirin (ASPIR-81 OR) Take 81 mg by mouth daily.    Coenzyme Q10 (COQ-10) 200 MG Oral Cap Take 100 mg by mouth daily.       Medical History:  He  has a past medical history of Chronic obstructive pulmonary disease with acute exacerbation (HCC) (3/23/2018), CPAP (continuous positive airway pressure) dependence, Hereditary hemochromatosis (01/2009), Influenza A  (H1N1) (1/7/2014), Kidney calculi, Meralgia paresthetica (9/11/2013), Plantar fasciitis (7/18/2013), and Trigger finger (6/3/2014).  Surgical History:  He  has a past surgical history that includes anesth,surgery of shoulder (01/01/2004); clarissa localization wire 1 site right (cpt=19281) (2000); clarissa localization wire 1 site left (cpt=19281) (10/2020); needle biopsy left (10/2020); needle biopsy left (10/2020); other surgical history (09/01/1970); other surgical history (10/30/2020); other surgical history (09/03/2021); Cataract extraction (Left, 06/2024); and cataract (Right).   Family History:  His family history includes Diabetes in his mother; emphysema in his father; heart failure in his father.  Social History:  He  reports that he has been smoking cigarettes. He has never used smokeless tobacco. He reports that he does not drink alcohol and does not use drugs.      CAGE Alcohol Screen:   CAGE screening score of 0 on 6/6/2025, showing low risk of alcohol abuse.    Patient Care Team:  Zita Andujar DO as PCP - General (Family Medicine)  Miller Cancino, PT, DPT, OCS, Cert MDT  as Physical Therapist (Physical Therapy)    Review of Systems  GENERAL: feels well otherwise  SKIN: denies any unusual skin lesions  EYES: denies blurred vision or double vision  HEENT: denies nasal congestion, sinus pain or ST  LUNGS: denies shortness of breath with exertion  CARDIOVASCULAR: denies chest pain on exertion  GI: denies abdominal pain, denies heartburn  : 1 per night nocturia, no complaint of urinary incontinence  MUSCULOSKELETAL: denies back pain  NEURO: denies headaches  PSYCHE: denies depression or anxiety  HEMATOLOGIC: denies hx of anemia  ENDOCRINE: denies thyroid history  ALL/ASTHMA: COPD    Objective:   Physical Exam  General Appearance:  Alert, cooperative, no distress, appears stated age   Head:  Normocephalic, without obvious abnormality, atraumatic   Eyes:  PERRL, conjunctiva/corneas clear, EOM's intact, both  eyes   Ears:  Normal TM's and external ear canals, both ears   Nose: Nares normal, septum midline, mucosa normal, no drainage or sinus tenderness   Throat: Lips, mucosa, and tongue normal; teeth and gums normal   Neck: Supple, symmetrical, trachea midline, no adenopathy, thyroid: not enlarged, symmetric, no tenderness/mass/nodules, no carotid bruit or JVD   Back:   Symmetric, no curvature, ROM normal, no CVA tenderness   Lungs:   Clear to auscultation bilaterally, respirations unlabored   Chest Wall:  No tenderness or deformity   Heart:  Regular rate and rhythm, S1, S2 normal, no murmur, rub or gallop   Abdomen:   Soft, non-tender, bowel sounds active all four quadrants,  no masses, no organomegaly   Genitalia: Not examined   Rectal: Not examined   Extremities: Extremities normal, atraumatic, no cyanosis or edema   Pulses: 2+ and symmetric   Skin: Skin color, texture, turgor normal, no rashes or lesions   Lymph nodes: Cervical, supraclavicular, and axillary nodes normal   Neurologic: Normal     /62   Pulse 90   Resp 20   Ht 5' 10\" (1.778 m)   Wt 253 lb (114.8 kg)   SpO2 96%   BMI 36.30 kg/m²  Estimated body mass index is 36.3 kg/m² as calculated from the following:    Height as of this encounter: 5' 10\" (1.778 m).    Weight as of this encounter: 253 lb (114.8 kg).    Medicare Hearing Assessment:   Hearing Screening    Screening Method: Whisper Test  Whisper Test Result: Pass         Visual Acuity:   Right Eye Visual Acuity: Corrected Right Eye Chart Acuity: 20/30   Left Eye Visual Acuity: Corrected Left Eye Chart Acuity: 20/30   Both Eyes Visual Acuity: Corrected Both Eyes Chart Acuity: 20/30   Able To Tolerate Visual Acuity: Yes        Assessment & Plan:   Priyank Araujo is a 74 year old male who presents for a Medicare Assessment.     1. Encounter for annual health examination (Primary)  -     Detailed, Mod Complex (04827)  2. Screening PSA (prostate specific antigen)  -     PSA Total,  Screen; Future; Expected date: 06/13/2025  -     Detailed, Mod Complex (29455)  3. Hyperglycemia- stable monitor   -     Hemoglobin A1C; Future; Expected date: 06/13/2025  -     Detailed, Mod Complex (59235)  4. Pure hypercholesterolemia- stable monitor   -     Comp Metabolic Panel (14); Future; Expected date: 06/13/2025  -     Lipid Panel; Future; Expected date: 06/13/2025  -     Simvastatin; Take 1 tablet (40 mg total) by mouth nightly.  Dispense: 90 tablet; Refill: 0  -     Detailed, Mod Complex (71812)  5. Other fatigue- stable monitor   -     Vitamin B12; Future; Expected date: 06/13/2025  -     CBC With Differential With Platelet; Future; Expected date: 06/13/2025  -     Assay, Thyroid Stim Hormone; Future; Expected date: 06/13/2025  -     Free T4, (Free Thyroxine); Future; Expected date: 06/13/2025  -     Detailed, Mod Complex (33344)  6. Vitamin D deficiency- stable monitor   -     Vitamin D; Future; Expected date: 06/13/2025  -     Detailed, Mod Complex (08819)  7. Stage 3b chronic kidney disease (HCC)  -     Detailed, Mod Complex (63747)  8. 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 Severe obesity (BMI 35.0-39.9) with comorbidity (HCC)- discussed diet and exercise ; has elevated cholesterol and hyperglycemia   -     Detailed, Mod Complex (66620)  10. Thrombocytopenia- stable monitor   -     Detailed, Mod Complex (14778)  11. Klinefelter syndrome (HCC)- stable monitor   -     Detailed, Mod Complex (17425)  12. PURNIMA on CPAP- stable cpm  -     Detailed, Mod Complex (22233)  13. Screening for lung cancer  -     Detailed, Mod Complex (52079)  -     Initial CT Lung Cancer screening (CPT=71271)- Please Answer Questions, Pack years and Smoking status; Future; Expected date: 06/13/2025  -     Shared Decision Making visit for Medicare for Lung Cancer CT screening  14. Personal history of tobacco use, presenting hazards to  health  -     Detailed, Mod Complex (33152)  -     Initial CT Lung Cancer screening (CPT=71271)- Please Answer Questions, Pack years and Smoking status; Future; Expected date: 06/13/2025  -     Shared Decision Making visit for Medicare for Lung Cancer CT screening  15. Hip pain, bilateral- check x-ray  -     XR HIP W OR WO PELVIS 3 OR 4 VIEWS, BILAT(CPT=73522); Future; Expected date: 06/13/2025  Other orders  -     Losartan Potassium-HCTZ; Take 1 tablet by mouth daily.  Dispense: 90 tablet; Refill: 0    Assessment & Plan      The patient indicates understanding of these issues and agrees to the plan.  Reinforced healthy diet, lifestyle, and exercise.    Return in 1 year (on 6/13/2026).     Zita Andujar DO, 6/13/2025     Supplementary Documentation:   General Health:  In the past six months, have you lost more than 10 pounds without trying?: (Patient-Rptd) 2 - No  Has your appetite been poor?: No  Type of Diet: (Patient-Rptd) Other  How does the patient maintain a good energy level?: (Patient-Rptd) Other  How would you describe your daily physical activity?: (Patient-Rptd) Light  How would you describe your current health state?: (Patient-Rptd) Good  How do you maintain positive mental well-being?: (Patient-Rptd) Puzzles, Games, Visiting Friends  In the past six months, have you experienced urine leakage?: (Patient-Rptd) 0-No  At any time do you feel concerned for the safety/well-being of yourself and/or your children, in your home or elsewhere?: No  Have you had any immunizations at another office such as Influenza, Hepatitis B, Tetanus, or Pneumococcal?: No    Health Maintenance   Topic Date Due    Annual Well Visit  01/01/2025    Tobacco Cessation Counseling  01/01/2025    COVID-19 Vaccine (7 - 2024-25 season) 06/06/2025    Colorectal Cancer Screening  11/05/2025    PSA  08/07/2026    Influenza Vaccine  Completed    Annual Depression Screening  Completed    Fall Risk Screening (Annual)  Completed    Pneumococcal  Vaccine: 50+ Years  Completed    Zoster Vaccines  Completed    Meningococcal B Vaccine  Aged Out     Lung Cancer Counseling and Shared Decision Making Session in an Asymptomatic Smoker/Former Smoker   Priyank Araujo is a 74 year old male without current symptoms of lung cancer.  History   Smoking Status    Every Day    Types: Cigarettes   Smokeless Tobacco    Never        He received information on the importance of adherence to annual lung cancer Low Dose CT (LDCT) screening, the impact of his comorbidities and his ability or willingness to undergo diagnosis and treatment. he is in agreement and an order will be placed for CT LUNG LD SCREENING(CPT=71271).    We counseled the importance of maintaining cigarette smoking abstinence if he is a former smoker and the importance of smoking cessation if he is a current smoker and we discussed and furnished information about tobacco cessation interventions.

## 2025-06-26 ENCOUNTER — OFFICE VISIT (OUTPATIENT)
Dept: PODIATRY CLINIC | Facility: CLINIC | Age: 75
End: 2025-06-26

## 2025-06-26 DIAGNOSIS — B35.1 ONYCHOMYCOSIS: ICD-10-CM

## 2025-06-26 DIAGNOSIS — N18.31 STAGE 3A CHRONIC KIDNEY DISEASE (HCC): Primary | ICD-10-CM

## 2025-06-26 DIAGNOSIS — L60.0 ONYCHOCRYPTOSIS: ICD-10-CM

## 2025-06-26 PROCEDURE — 1159F MED LIST DOCD IN RCRD: CPT | Performed by: PODIATRIST

## 2025-06-26 PROCEDURE — 99213 OFFICE O/P EST LOW 20 MIN: CPT | Performed by: PODIATRIST

## 2025-06-26 NOTE — PROGRESS NOTES
Priyank Araujo is a 74 year old male.   Chief Complaint   Patient presents with    Toenail Care     Nail trim and foot check f/u- last A1c= 6.0 on 6/13/25- was also LOV w/ PCP Dr. Andujar- per pt he's pre-diabetic         HPI:   Patient returns to the clinic at this time for routine care he has been working he has dropped some weight he is now at 6.0 for his hemoglobin A1c has been doing very well with it he has no specific complaints about his feet no sores ulcers or draining areas are noted.  At today's visit reviewed nurse's history as taken above, allergies medications and medical history as documented below.  All changes duly noted  Allergies: Cephalosporins and Keflex   Current Outpatient Medications   Medication Sig Dispense Refill    losartan-hydroCHLOROthiazide 100-25 MG Oral Tab Take 1 tablet by mouth daily. 90 tablet 0    simvastatin 40 MG Oral Tab Take 1 tablet (40 mg total) by mouth nightly. 90 tablet 0    diclofenac 1 % External Gel Apply topically 4 (four) times daily.      polypropylene glycol- 0.4-0.3 % Ophthalmic Solution Place 1 drop into the right eye as needed.      albuterol (VENTOLIN HFA) 108 (90 Base) MCG/ACT Inhalation Aero Soln Inhale 2 puffs into the lungs every 4 (four) hours as needed for Wheezing. 108 g 3    latanoprost 0.005 % Ophthalmic Solution Place into both eyes nightly.      HYDROcodone-acetaminophen 5-325 MG Oral Tab Take 1 tablet by mouth every 4 (four) hours as needed for Pain.      calcium carbonate-vitamin D 250-3.125 MG-MCG Oral Tab Take 1 tablet by mouth daily.      folic acid 800 MCG Oral Tab Take 1 tablet (800 mcg total) by mouth daily.      acetaminophen-codeine 300-30 MG Oral Tab       bacitracin 500 UNIT/GM External Ointment Apply topically as needed (for cold sores).      mupirocin 2 % External Ointment Apply to affected area three times daily for 7 days 1 g 0    Homeopathic Products (ARNICA) External Gel Apply topically as needed.        Lutein 20  MG Oral Cap Take 20 mg by mouth daily.      Vitamin D3, Cholecalciferol, 50 MCG (2000 UT) Oral Tab Take by mouth.        Pyridoxine HCl (VITAMIN B-6) 100 MG Oral Tab Take 10 tablets (1,000 mg total) by mouth twice a week.      Polyethyl Glycol-Propyl Glycol 0.4-0.3 % Ophthalmic Gel Apply to eye.        Aspirin (ASPIR-81 OR) Take 81 mg by mouth daily.      Coenzyme Q10 (COQ-10) 200 MG Oral Cap Take 100 mg by mouth daily.        Past Medical History:    Chronic obstructive pulmonary disease with acute exacerbation (HCC)    CPAP (continuous positive airway pressure) dependence    Hereditary hemochromatosis    Influenza A (H1N1)    Kidney calculi    Meralgia paresthetica    Plantar fasciitis    Trigger finger      Past Surgical History:   Procedure Laterality Date    Anesth,surgery of shoulder  01/01/2004    lump left    Cataract Right     july 2024    Cataract extraction Left 06/2024    Melecio localization wire 1 site left (cpt=19281)  10/2020    Abscess formation, chronic inflammation    Melecio localization wire 1 site right (cpt=19281)  2000    Needle biopsy left  10/2020    Inflammatory exudate    Needle biopsy left  10/2020    LN    Other surgical history  09/01/1970    testis    Other surgical history  10/30/2020    I&D BREAST ABSCESS W/ [facility]     Other surgical history  09/03/2021    groin cyst - [facility]       Family History   Problem Relation Age of Onset    Other (heart failure) Father     Other (emphysema) Father     Diabetes Mother       Social History     Socioeconomic History    Marital status:    Tobacco Use    Smoking status: Every Day     Current packs/day: 1.00     Types: Cigarettes    Smokeless tobacco: Never   Vaping Use    Vaping status: Never Used   Substance and Sexual Activity    Alcohol use: No     Alcohol/week: 0.0 standard drinks of alcohol     Comment: social     Drug use: No   Other Topics Concern    Caffeine Concern Yes    Exercise No           REVIEW OF SYSTEMS:   Today reviewed systens as  documented below  GENERAL HEALTH: feels well otherwise  SKIN: Refer to exam below  RESPIRATORY: denies shortness of breath with exertion  CARDIOVASCULAR: denies chest pain on exertion  GI: denies abdominal pain and denies heartburn  NEURO: denies headaches    EXAM:   There were no vitals taken for this visit.  GENERAL: well developed, well nourished, in no apparent distress  EXTREMITIES:   1. Integument: His foot is warm and dry his toenails 1-5 are thickened dystrophic the medial lateral nail borders of the hallux are incurvated with hyperkeratotic impaction toe has slight erythema at the tip there is a small scratch adjacent to the nail is not draining is not infected.  There is no pain on palpation.   2. Vascular: Patient has palpable pulses dorsalis pedis posterior tibial bilateral they are symmetrical and equivocal with good cap return to the pedal digits   3. Neurologic: Patient has intact sensorium still feels pain sensation   4. Musculoskeletal: Deformities present has good muscle strength    ASSESSMENT AND PLAN:   Diagnoses and all orders for this visit:    Stage 3a chronic kidney disease (HCC)    Onychocryptosis    Onychomycosis        Plan: Today using a nail nippers were trimmed and debrided toenails 1-5 manually and mechanically in girth and width as far down to healthy tissue as possible on both feet.  This was done uneventfully there was no hemorrhage.  There is mild incurvation of the medial and lateral nail borders of the hallux which using a slant back technique were removed and they would not get ingrown.   Advised patient again on smoking cessation.  He will consider it follow-up with us every 2 to 3 months for routine care    Patient is still smoking approximately 1 pack of cigarettes per day I again discussed with him the benefits of smoking cessation and to consider consulting his PCP..    The patient indicates understanding of these issues and agrees to the plan.    Curtis Gutierrez DPM

## 2025-08-08 ENCOUNTER — HOSPITAL ENCOUNTER (OUTPATIENT)
Dept: GENERAL RADIOLOGY | Facility: HOSPITAL | Age: 75
Discharge: HOME | End: 2025-08-08
Attending: FAMILY MEDICINE

## 2025-08-08 DIAGNOSIS — M54.2 NECK PAIN: ICD-10-CM

## 2025-08-08 DIAGNOSIS — M25.551 HIP PAIN, BILATERAL: ICD-10-CM

## 2025-08-08 DIAGNOSIS — M25.522 LEFT ELBOW PAIN: ICD-10-CM

## 2025-08-08 DIAGNOSIS — M25.552 HIP PAIN, BILATERAL: ICD-10-CM

## 2025-08-08 PROCEDURE — 73523 X-RAY EXAM HIPS BI 5/> VIEWS: CPT | Performed by: FAMILY MEDICINE

## 2025-08-08 PROCEDURE — 72050 X-RAY EXAM NECK SPINE 4/5VWS: CPT | Performed by: FAMILY MEDICINE

## 2025-08-08 PROCEDURE — 73030 X-RAY EXAM OF SHOULDER: CPT | Performed by: FAMILY MEDICINE

## 2025-08-08 PROCEDURE — 73080 X-RAY EXAM OF ELBOW: CPT | Performed by: FAMILY MEDICINE

## (undated) DIAGNOSIS — L72.3 SEBACEOUS CYST: Primary | ICD-10-CM

## (undated) DIAGNOSIS — M54.42 LEFT-SIDED LOW BACK PAIN WITH LEFT-SIDED SCIATICA, UNSPECIFIED CHRONICITY: Primary | ICD-10-CM

## (undated) NOTE — LETTER
2020    Return to School / Work    Name: Kiana Maldonado        : 1950    To Whom It May Concern,    Kiana Maldonado was seen in our office on 2020 and does not require antibiotics prior to any dental procedure.       Comments:

## (undated) NOTE — Clinical Note
I had the pleasure of seeing Regina Mention on 9/1/2021. Please see my attached note.     Armida Lundborg, MD FACS  [facility]

## (undated) NOTE — Clinical Note
I had the pleasure of seeing Praveen Moreno on 5/4/2022. Please see my attached note.     Jaren Mondragon MD FACS  [facility]

## (undated) NOTE — LETTER
11/10/20    Patient: Ambrocio Eye  : 1950 Visit date: 11/10/2020    Dear  Clarence Rios DO    Thank you for referring Ambrocio Eye to my practice. Please find my assessment and plan below.      History of Present Illness   70-ye Patient has an appointment  with Dr. Ra Zaragoza on Wednesday, November 18, 2020 he will keep this appointment for repeat examination and further follow-up            Sincerely,       Gladys Kohler MD   CC: No Recipients

## (undated) NOTE — Clinical Note
I had the pleasure of seeing Eleni Kamara on 10/26/2021. Please see my attached note.     Rossana Shipman MD FACS  [facility]

## (undated) NOTE — ED AVS SNAPSHOT
[facility] Emergency Department    [place]  [address] [address]    Phone:  [phone]    Fax:  [phone]           Tomeka Alfonso   MRN: [id number]    Department:  [facility] Emergency Department   Date of Visit: IF THERE IS ANY CHANGE OR WORSENING OF YOUR CONDITION, CALL YOUR PRIMARY CARE PHYSICIAN AT ONCE OR RETURN IMMEDIATELY TO THE EMERGENCY DEPARTMENT.     If you have been prescribed any medication(s), please fill your prescription right away and begin taking t

## (undated) NOTE — ED AVS SNAPSHOT
[facility] Emergency Department    [place]  [address] [address]    Phone:  [phone]    Fax:  [phone]           Bryan Quezada   MRN: [id number]    Department:  [facility] Emergency Department   Date of Visit: coverage for follow-up care and referrals. [address] [facility] [employer] [place] [phone]  Pediatric [address] [id number] Emergency Department   [phone]       To Check ER Wait Times:  TEXT 'ERwait' to [id number]      Click [email or web address] will be contacted. Please make sure we have your correct phone number before you leave. After you leave, you should follow the attached instructions. I have read and understand the instructions given to me by my caregivers.         [facility] XR RIBS WITH CHEST (3 VIEWS), RIGHT  (CPT=71101) (Final result) Result time:  06/10/17 14:24:22    Final result    Impression:    CONCLUSION:    1.  Mild pulmonary vascular congestion and borderline hepatomegaly, correlate clinically for CHF or fluid overl

## (undated) NOTE — LETTER
10/23/17        Aleta Montes  [address] [place] [address] [place] [address]      Dear THE [facility],    [address] [facility] records indicate that you have outstanding lab work and or testing that was ordered for you and has not yet been completed:          Comp Metabolic Pane

## (undated) NOTE — Clinical Note
I had the pleasure of seeing Allegan Record on 7/13/2021. Please see my attached note.     Hui Rocha MD FACS  [facility]

## (undated) NOTE — LETTER
04/23/18        Jyoti Medina  [address] [facility] [address]      Dear Roxanne Tenorio,    [address] records indicate that you have outstanding lab work and or testing that was ordered for you and has not yet been completed:          Comp Metabolic Pane

## (undated) NOTE — Clinical Note
I had the pleasure of seeing Blank Alvarez on 10/5/2020. Please see my attached note.     Andrew Milian MD FACS  [facility]

## (undated) NOTE — LETTER
Patient Name: Claudean Rana  YOB: 1950          MRN number:  [id number]  Date:  9/17/2018  Referring Physician:  Lisbeth Marinelli     Discharge Summary    Pt has attended 15, cancelled 0, and no shown 0 visits in Physical Therapy.      Dx: Edwige Elaine Accessory motion: wnl (was:pain with AP and PA testing tibiofemoral joint, decreased patellar mobility primarily inferiorly)     Strength/MMT:   Hip Knee Foot/Ankle   Flexion: R 5/5 (was:4+/5); L 5/5  Extension: R 5/5 (was:4+/5); L 5/5  Abduction: R 5/5 (w treatment options and has agreed to actively participate in planning and for this course of care. Thank you for your referral. If you have any questions, please contact me at Dept: [phone].     Sincerely,  Electronically signed by therapist: Raymundo Rocha,

## (undated) NOTE — Clinical Note
I had the pleasure of seeing Ray Wells on 10/12/2020. Please see my attached note.     Reny Arzate MD FACS  [facility]

## (undated) NOTE — Clinical Note
I had the pleasure of seeing Ambrocio Eye on 8/17/2021. Please see my attached note.     Irais Shepherd MD FACS  [facility]

## (undated) NOTE — Clinical Note
I had the pleasure of seeing Priya Goel on 6/22/2021. Please see my attached note.     Nikolas Garay MD FACS  [facility]

## (undated) NOTE — ED AVS SNAPSHOT
Rj Alegria   MRN: [id number]    Department:  [facility] Emergency Department   Date of Visit:  8/13/2018           Disclosure     Insurance plans vary and the physician(s) referred by the ER may not be covered by your plan.  Please contac tell this physician (or your personal doctor if your instructions are to return to your personal doctor) about any new or lasting problems. The primary care or specialist physician will see patients referred from the [facility] Emergency Department.  Taqueria Padilla

## (undated) NOTE — Clinical Note
I had the pleasure of seeing Jae Soliman on 6/8/2021. Please see my attached note.     Haley Marshall MD FACS  [facility]

## (undated) NOTE — Clinical Note
I had the pleasure of seeing Kelsea Giraldo on 10/7/2020. Please see my attached note.     Viola Richardson MD FACS  [facility]

## (undated) NOTE — Clinical Note
I had the pleasure of seeing Yara Menezes on 11/9/2021. Please see my attached note.     Sofie Merlin, MD FACS  [facility]

## (undated) NOTE — LETTER
04/15/19        Claudean Rana  [address] [place] [address] [place] [address]      Dear THE [facility] OF [facility],    [address] [facility] records indicate that you have outstanding lab work and or testing that was ordered for you and has not yet been completed:  Orders Placed This Encounte

## (undated) NOTE — Clinical Note
I had the pleasure of seeing Crystal Murcia on 4/12/2023. Please see my attached note.   Bj Saenz MD FACS [facility]

## (undated) NOTE — Clinical Note
I had the pleasure of seeing Rj Alegria on 12/2/2020. Please see my attached note.     Nikolas Garay MD FACS  [facility]

## (undated) NOTE — Clinical Note
I had the pleasure of seeing Maryjo Bence on 12/19/2022. Please see my attached note.   Rupesh Garza MD FACS [facility]

## (undated) NOTE — Clinical Note
I had the pleasure of seeing Sherry Arenas on 11/18/2020. Please see my attached note.     Kayla Marsh MD FACS  [facility]

## (undated) NOTE — MR AVS SNAPSHOT
[address]  [address] [facility], [address] [address] [phone]               Thank you for choosing us for your health care visit with Paul Canales DO.   We are glad to serve you and happy to provide you with this Folic Acid 0.8 MG Caps   Take  by mouth daily. furosemide 20 MG Tabs   Take 1 tablet by mouth daily. Commonly known as:  LASIX           Losartan Potassium-HCTZ 100-12.5 MG Tabs   Take 1 tablet by mouth daily.    Commonly known as:  HYZAAR Don’t eat while when you’re bored.      EAT THESE FOODS MORE OFTEN: EAT THESE FOODS LESS OFTEN:   Make half your plate fruits and vegetables Highly refined, white starches including white bread, rice and pasta   Eat plenty of protein, keep the fat content l